# Patient Record
Sex: FEMALE | Race: WHITE | Employment: UNEMPLOYED | URBAN - METROPOLITAN AREA
[De-identification: names, ages, dates, MRNs, and addresses within clinical notes are randomized per-mention and may not be internally consistent; named-entity substitution may affect disease eponyms.]

---

## 2017-09-15 ENCOUNTER — HOSPITAL ENCOUNTER (OUTPATIENT)
Dept: CT IMAGING | Age: 57
Discharge: HOME | End: 2017-09-15
Attending: FAMILY MEDICINE
Payer: MEDICARE

## 2017-09-15 DIAGNOSIS — Z87.891 HISTORY OF TOBACCO USE: ICD-10-CM

## 2017-09-15 PROCEDURE — G0297 LDCT FOR LUNG CA SCREEN: HCPCS

## 2017-09-18 NOTE — PROGRESS NOTES
She has a 7 mm nodular density near the heart on the right, it is recommended in 6 months to get a contrast-enhanced CT of chest to recheck this    There is some inflammation/infection in the bronchioles which are the smaller airways that carry air in the lungs, if she is coughing mucus we can try antibiotics but we treat this based on her symptoms not the x-ray    Reducing or stopping smoking if she has not already is encouraged

## 2017-11-28 PROBLEM — E66.01 OBESITY, MORBID (HCC): Status: ACTIVE | Noted: 2017-11-28

## 2017-11-28 PROBLEM — R91.1 PULMONARY NODULE: Status: ACTIVE | Noted: 2017-11-28

## 2018-03-16 ENCOUNTER — HOSPITAL ENCOUNTER (OUTPATIENT)
Dept: CT IMAGING | Age: 58
Discharge: HOME | End: 2018-03-16
Attending: FAMILY MEDICINE
Payer: MEDICARE

## 2018-03-16 ENCOUNTER — HOSPITAL ENCOUNTER (OUTPATIENT)
Dept: GENERAL RADIOLOGY | Age: 58
Discharge: HOME | End: 2018-03-16
Attending: FAMILY MEDICINE
Payer: MEDICARE

## 2018-03-16 DIAGNOSIS — R91.1 PULMONARY NODULE: ICD-10-CM

## 2018-03-16 DIAGNOSIS — M54.9 BACK PAIN, UNSPECIFIED BACK LOCATION, UNSPECIFIED BACK PAIN LATERALITY, UNSPECIFIED CHRONICITY: ICD-10-CM

## 2018-03-16 PROCEDURE — 72100 X-RAY EXAM L-S SPINE 2/3 VWS: CPT

## 2018-03-16 PROCEDURE — 71250 CT THORAX DX C-: CPT

## 2018-05-01 PROBLEM — R06.02 SHORTNESS OF BREATH: Status: ACTIVE | Noted: 2018-05-01

## 2018-05-01 PROBLEM — Z72.0 TOBACCO USE: Status: ACTIVE | Noted: 2018-05-01

## 2020-01-18 ENCOUNTER — HOSPITAL ENCOUNTER (OUTPATIENT)
Dept: CT IMAGING | Age: 60
Discharge: HOME | End: 2020-01-18
Attending: NURSE PRACTITIONER
Payer: MEDICARE

## 2020-01-18 DIAGNOSIS — R31.9 HEMATURIA, UNSPECIFIED: ICD-10-CM

## 2020-01-18 PROCEDURE — 74176 CT ABD & PELVIS W/O CONTRAST: CPT

## 2021-01-19 ENCOUNTER — HOSPITAL ENCOUNTER (INPATIENT)
Age: 61
LOS: 1 days | Discharge: HOME | DRG: 246 | End: 2021-01-20
Admitting: INTERNAL MEDICINE
Payer: MEDICARE

## 2021-01-19 ENCOUNTER — APPOINTMENT (OUTPATIENT)
Dept: GENERAL RADIOLOGY | Age: 61
DRG: 246 | End: 2021-01-19
Payer: MEDICARE

## 2021-01-19 DIAGNOSIS — I10 ESSENTIAL HYPERTENSION: ICD-10-CM

## 2021-01-19 DIAGNOSIS — I21.4 NSTEMI (NON-ST ELEVATED MYOCARDIAL INFARCTION) (HCC): Primary | ICD-10-CM

## 2021-01-19 DIAGNOSIS — E78.00 PURE HYPERCHOLESTEROLEMIA: ICD-10-CM

## 2021-01-19 PROBLEM — Z82.49 FH: CAD (CORONARY ARTERY DISEASE): Status: ACTIVE | Noted: 2021-01-19

## 2021-01-19 PROBLEM — F17.200 NICOTINE USE DISORDER: Status: ACTIVE | Noted: 2021-01-19

## 2021-01-19 PROBLEM — R06.09 DOE (DYSPNEA ON EXERTION): Status: ACTIVE | Noted: 2021-01-19

## 2021-01-19 LAB
ACT BLD: 538 SECS (ref 70–128)
ALBUMIN SERPL-MCNC: 3.6 G/DL (ref 3.5–5)
ALBUMIN/GLOB SERPL: 1 {RATIO} (ref 1.2–3.5)
ALP SERPL-CCNC: 74 U/L (ref 50–136)
ALT SERPL-CCNC: 21 U/L (ref 12–65)
ANION GAP SERPL CALC-SCNC: 6 MMOL/L (ref 7–16)
AST SERPL-CCNC: 9 U/L (ref 15–37)
ATRIAL RATE: 57 BPM
BASOPHILS # BLD: 0.1 K/UL (ref 0–0.2)
BASOPHILS NFR BLD: 1 % (ref 0–2)
BILIRUB SERPL-MCNC: 0.3 MG/DL (ref 0.2–1.1)
BUN SERPL-MCNC: 11 MG/DL (ref 6–23)
CALCIUM SERPL-MCNC: 9.4 MG/DL (ref 8.3–10.4)
CALCULATED P AXIS, ECG09: 47 DEGREES
CALCULATED R AXIS, ECG10: 74 DEGREES
CALCULATED T AXIS, ECG11: 70 DEGREES
CHLORIDE SERPL-SCNC: 108 MMOL/L (ref 98–107)
CK SERPL-CCNC: 1170 U/L (ref 21–215)
CO2 SERPL-SCNC: 27 MMOL/L (ref 21–32)
CREAT SERPL-MCNC: 0.82 MG/DL (ref 0.6–1)
DIAGNOSIS, 93000: NORMAL
DIFFERENTIAL METHOD BLD: NORMAL
EOSINOPHIL # BLD: 0.1 K/UL (ref 0–0.8)
EOSINOPHIL NFR BLD: 2 % (ref 0.5–7.8)
ERYTHROCYTE [DISTWIDTH] IN BLOOD BY AUTOMATED COUNT: 13.6 % (ref 11.9–14.6)
GLOBULIN SER CALC-MCNC: 3.6 G/DL (ref 2.3–3.5)
GLUCOSE SERPL-MCNC: 119 MG/DL (ref 65–100)
HCT VFR BLD AUTO: 44.3 % (ref 35.8–46.3)
HGB BLD-MCNC: 14.4 G/DL (ref 11.7–15.4)
IMM GRANULOCYTES # BLD AUTO: 0 K/UL (ref 0–0.5)
IMM GRANULOCYTES NFR BLD AUTO: 0 % (ref 0–5)
LYMPHOCYTES # BLD: 3 K/UL (ref 0.5–4.6)
LYMPHOCYTES NFR BLD: 31 % (ref 13–44)
MAGNESIUM SERPL-MCNC: 2.2 MG/DL (ref 1.8–2.4)
MCH RBC QN AUTO: 30.8 PG (ref 26.1–32.9)
MCHC RBC AUTO-ENTMCNC: 32.5 G/DL (ref 31.4–35)
MCV RBC AUTO: 94.7 FL (ref 79.6–97.8)
MONOCYTES # BLD: 0.6 K/UL (ref 0.1–1.3)
MONOCYTES NFR BLD: 6 % (ref 4–12)
NEUTS SEG # BLD: 5.8 K/UL (ref 1.7–8.2)
NEUTS SEG NFR BLD: 60 % (ref 43–78)
NRBC # BLD: 0 K/UL (ref 0–0.2)
P-R INTERVAL, ECG05: 144 MS
PLATELET # BLD AUTO: 252 K/UL (ref 150–450)
PMV BLD AUTO: 11.1 FL (ref 9.4–12.3)
POTASSIUM SERPL-SCNC: 3.8 MMOL/L (ref 3.5–5.1)
PROT SERPL-MCNC: 7.2 G/DL (ref 6.3–8.2)
Q-T INTERVAL, ECG07: 462 MS
QRS DURATION, ECG06: 72 MS
QTC CALCULATION (BEZET), ECG08: 449 MS
RBC # BLD AUTO: 4.68 M/UL (ref 4.05–5.2)
SODIUM SERPL-SCNC: 141 MMOL/L (ref 136–145)
TROPONIN-HIGH SENSITIVITY: 86.2 PG/ML (ref 0–14)
TROPONIN-HIGH SENSITIVITY: 948.4 PG/ML (ref 0–14)
TROPONIN-HIGH SENSITIVITY: ABNORMAL PG/ML (ref 0–14)
VENTRICULAR RATE, ECG03: 57 BPM
WBC # BLD AUTO: 9.6 K/UL (ref 4.3–11.1)

## 2021-01-19 PROCEDURE — 74011000250 HC RX REV CODE- 250

## 2021-01-19 PROCEDURE — 77030015766

## 2021-01-19 PROCEDURE — 80053 COMPREHEN METABOLIC PANEL: CPT

## 2021-01-19 PROCEDURE — 74011250637 HC RX REV CODE- 250/637: Performed by: INTERNAL MEDICINE

## 2021-01-19 PROCEDURE — 77030019569 HC BND COMPR RAD TERU -B

## 2021-01-19 PROCEDURE — 74011250637 HC RX REV CODE- 250/637

## 2021-01-19 PROCEDURE — 93458 L HRT ARTERY/VENTRICLE ANGIO: CPT

## 2021-01-19 PROCEDURE — 37195 THROMBOLYTIC THERAPY STROKE: CPT

## 2021-01-19 PROCEDURE — C1725 CATH, TRANSLUMIN NON-LASER: HCPCS

## 2021-01-19 PROCEDURE — 84484 ASSAY OF TROPONIN QUANT: CPT

## 2021-01-19 PROCEDURE — 92928 PRQ TCAT PLMT NTRAC ST 1 LES: CPT

## 2021-01-19 PROCEDURE — 77030016699 HC CATH ANGI DX INFN1 CARD -A

## 2021-01-19 PROCEDURE — 99152 MOD SED SAME PHYS/QHP 5/>YRS: CPT

## 2021-01-19 PROCEDURE — 85347 COAGULATION TIME ACTIVATED: CPT

## 2021-01-19 PROCEDURE — C1887 CATHETER, GUIDING: HCPCS

## 2021-01-19 PROCEDURE — 92941 PRQ TRLML REVSC TOT OCCL AMI: CPT | Performed by: INTERNAL MEDICINE

## 2021-01-19 PROCEDURE — 74011250636 HC RX REV CODE- 250/636: Performed by: INTERNAL MEDICINE

## 2021-01-19 PROCEDURE — 96374 THER/PROPH/DIAG INJ IV PUSH: CPT

## 2021-01-19 PROCEDURE — 99218 HC RM OBSERVATION: CPT

## 2021-01-19 PROCEDURE — 83735 ASSAY OF MAGNESIUM: CPT

## 2021-01-19 PROCEDURE — 74011000636 HC RX REV CODE- 636: Performed by: INTERNAL MEDICINE

## 2021-01-19 PROCEDURE — 99152 MOD SED SAME PHYS/QHP 5/>YRS: CPT | Performed by: INTERNAL MEDICINE

## 2021-01-19 PROCEDURE — 99285 EMERGENCY DEPT VISIT HI MDM: CPT

## 2021-01-19 PROCEDURE — 99153 MOD SED SAME PHYS/QHP EA: CPT

## 2021-01-19 PROCEDURE — C1894 INTRO/SHEATH, NON-LASER: HCPCS

## 2021-01-19 PROCEDURE — 99220 PR INITIAL OBSERVATION CARE/DAY 70 MINUTES: CPT | Performed by: INTERNAL MEDICINE

## 2021-01-19 PROCEDURE — 92928 PRQ TCAT PLMT NTRAC ST 1 LES: CPT | Performed by: INTERNAL MEDICINE

## 2021-01-19 PROCEDURE — 74011250637 HC RX REV CODE- 250/637: Performed by: PHYSICIAN ASSISTANT

## 2021-01-19 PROCEDURE — 74011000250 HC RX REV CODE- 250: Performed by: INTERNAL MEDICINE

## 2021-01-19 PROCEDURE — 93458 L HRT ARTERY/VENTRICLE ANGIO: CPT | Performed by: INTERNAL MEDICINE

## 2021-01-19 PROCEDURE — 82550 ASSAY OF CK (CPK): CPT

## 2021-01-19 PROCEDURE — 4A023N7 MEASUREMENT OF CARDIAC SAMPLING AND PRESSURE, LEFT HEART, PERCUTANEOUS APPROACH: ICD-10-PCS | Performed by: INTERNAL MEDICINE

## 2021-01-19 PROCEDURE — C1769 GUIDE WIRE: HCPCS

## 2021-01-19 PROCEDURE — 71045 X-RAY EXAM CHEST 1 VIEW: CPT

## 2021-01-19 PROCEDURE — B2111ZZ FLUOROSCOPY OF MULTIPLE CORONARY ARTERIES USING LOW OSMOLAR CONTRAST: ICD-10-PCS | Performed by: INTERNAL MEDICINE

## 2021-01-19 PROCEDURE — 74011250636 HC RX REV CODE- 250/636: Performed by: EMERGENCY MEDICINE

## 2021-01-19 PROCEDURE — 85025 COMPLETE CBC W/AUTO DIFF WBC: CPT

## 2021-01-19 PROCEDURE — 027135Z DILATION OF CORONARY ARTERY, TWO ARTERIES WITH TWO DRUG-ELUTING INTRALUMINAL DEVICES, PERCUTANEOUS APPROACH: ICD-10-PCS | Performed by: INTERNAL MEDICINE

## 2021-01-19 PROCEDURE — 92941 PRQ TRLML REVSC TOT OCCL AMI: CPT

## 2021-01-19 PROCEDURE — C1874 STENT, COATED/COV W/DEL SYS: HCPCS

## 2021-01-19 PROCEDURE — 36415 COLL VENOUS BLD VENIPUNCTURE: CPT

## 2021-01-19 PROCEDURE — 74011250636 HC RX REV CODE- 250/636

## 2021-01-19 PROCEDURE — B2151ZZ FLUOROSCOPY OF LEFT HEART USING LOW OSMOLAR CONTRAST: ICD-10-PCS | Performed by: INTERNAL MEDICINE

## 2021-01-19 PROCEDURE — 77030012468 HC VLV BLEEDBK CNTRL ABBT -B

## 2021-01-19 PROCEDURE — 96376 TX/PRO/DX INJ SAME DRUG ADON: CPT

## 2021-01-19 PROCEDURE — 93306 TTE W/DOPPLER COMPLETE: CPT

## 2021-01-19 PROCEDURE — 2709999900 HC NON-CHARGEABLE SUPPLY

## 2021-01-19 PROCEDURE — 96375 TX/PRO/DX INJ NEW DRUG ADDON: CPT

## 2021-01-19 PROCEDURE — 93005 ELECTROCARDIOGRAM TRACING: CPT

## 2021-01-19 RX ORDER — LEVOTHYROXINE SODIUM 75 UG/1
75 TABLET ORAL
Status: DISCONTINUED | OUTPATIENT
Start: 2021-01-20 | End: 2021-01-20 | Stop reason: HOSPADM

## 2021-01-19 RX ORDER — LIDOCAINE HYDROCHLORIDE 20 MG/ML
15 SOLUTION OROPHARYNGEAL
Status: COMPLETED | OUTPATIENT
Start: 2021-01-19 | End: 2021-01-19

## 2021-01-19 RX ORDER — HEPARIN SODIUM 5000 [USP'U]/100ML
12-25 INJECTION, SOLUTION INTRAVENOUS
Status: DISCONTINUED | OUTPATIENT
Start: 2021-01-19 | End: 2021-01-19

## 2021-01-19 RX ORDER — LIDOCAINE HYDROCHLORIDE 10 MG/ML
3-20 INJECTION, SOLUTION EPIDURAL; INFILTRATION; INTRACAUDAL; PERINEURAL ONCE
Status: COMPLETED | OUTPATIENT
Start: 2021-01-19 | End: 2021-01-19

## 2021-01-19 RX ORDER — MIDAZOLAM HYDROCHLORIDE 1 MG/ML
.5-2 INJECTION, SOLUTION INTRAMUSCULAR; INTRAVENOUS
Status: DISCONTINUED | OUTPATIENT
Start: 2021-01-19 | End: 2021-01-19

## 2021-01-19 RX ORDER — ONDANSETRON 2 MG/ML
4 INJECTION INTRAMUSCULAR; INTRAVENOUS
Status: COMPLETED | OUTPATIENT
Start: 2021-01-19 | End: 2021-01-19

## 2021-01-19 RX ORDER — MORPHINE SULFATE 4 MG/ML
4 INJECTION INTRAVENOUS
Status: COMPLETED | OUTPATIENT
Start: 2021-01-19 | End: 2021-01-19

## 2021-01-19 RX ORDER — MAG HYDROX/ALUMINUM HYD/SIMETH 200-200-20
30 SUSPENSION, ORAL (FINAL DOSE FORM) ORAL
Status: COMPLETED | OUTPATIENT
Start: 2021-01-19 | End: 2021-01-19

## 2021-01-19 RX ORDER — MORPHINE SULFATE 2 MG/ML
2 INJECTION, SOLUTION INTRAMUSCULAR; INTRAVENOUS
Status: DISCONTINUED | OUTPATIENT
Start: 2021-01-19 | End: 2021-01-20 | Stop reason: HOSPADM

## 2021-01-19 RX ORDER — ATORVASTATIN CALCIUM 10 MG/1
10 TABLET, FILM COATED ORAL DAILY
Status: DISCONTINUED | OUTPATIENT
Start: 2021-01-20 | End: 2021-01-20

## 2021-01-19 RX ORDER — LISINOPRIL 5 MG/1
10 TABLET ORAL DAILY
Status: DISCONTINUED | OUTPATIENT
Start: 2021-01-19 | End: 2021-01-20 | Stop reason: HOSPADM

## 2021-01-19 RX ORDER — HEPARIN SODIUM 200 [USP'U]/100ML
2 INJECTION, SOLUTION INTRAVENOUS CONTINUOUS
Status: DISCONTINUED | OUTPATIENT
Start: 2021-01-19 | End: 2021-01-19

## 2021-01-19 RX ORDER — HEPARIN SODIUM 10000 [USP'U]/ML
3000 INJECTION, SOLUTION INTRAVENOUS; SUBCUTANEOUS ONCE
Status: COMPLETED | OUTPATIENT
Start: 2021-01-19 | End: 2021-01-19

## 2021-01-19 RX ORDER — HEPARIN SODIUM 5000 [USP'U]/ML
60 INJECTION, SOLUTION INTRAVENOUS; SUBCUTANEOUS ONCE
Status: COMPLETED | OUTPATIENT
Start: 2021-01-19 | End: 2021-01-19

## 2021-01-19 RX ORDER — FENTANYL CITRATE 50 UG/ML
25-100 INJECTION, SOLUTION INTRAMUSCULAR; INTRAVENOUS
Status: DISCONTINUED | OUTPATIENT
Start: 2021-01-19 | End: 2021-01-19

## 2021-01-19 RX ORDER — GUAIFENESIN 100 MG/5ML
81 LIQUID (ML) ORAL DAILY
Status: DISCONTINUED | OUTPATIENT
Start: 2021-01-20 | End: 2021-01-20 | Stop reason: HOSPADM

## 2021-01-19 RX ORDER — SODIUM CHLORIDE 0.9 % (FLUSH) 0.9 %
5-40 SYRINGE (ML) INJECTION EVERY 8 HOURS
Status: DISCONTINUED | OUTPATIENT
Start: 2021-01-19 | End: 2021-01-20 | Stop reason: HOSPADM

## 2021-01-19 RX ORDER — ATROPINE SULFATE 0.1 MG/ML
0.5 INJECTION INTRAVENOUS AS NEEDED
Status: DISCONTINUED | OUTPATIENT
Start: 2021-01-19 | End: 2021-01-19

## 2021-01-19 RX ORDER — PANTOPRAZOLE SODIUM 40 MG/1
40 TABLET, DELAYED RELEASE ORAL
Status: DISCONTINUED | OUTPATIENT
Start: 2021-01-20 | End: 2021-01-20 | Stop reason: HOSPADM

## 2021-01-19 RX ORDER — HYDROCODONE BITARTRATE AND ACETAMINOPHEN 10; 325 MG/1; MG/1
2 TABLET ORAL
Status: DISCONTINUED | OUTPATIENT
Start: 2021-01-19 | End: 2021-01-20 | Stop reason: HOSPADM

## 2021-01-19 RX ORDER — FLUTICASONE PROPIONATE 50 MCG
2 SPRAY, SUSPENSION (ML) NASAL DAILY
Status: DISCONTINUED | OUTPATIENT
Start: 2021-01-20 | End: 2021-01-20 | Stop reason: HOSPADM

## 2021-01-19 RX ORDER — NITROGLYCERIN 0.4 MG/1
0.4 TABLET SUBLINGUAL
Status: DISCONTINUED | OUTPATIENT
Start: 2021-01-19 | End: 2021-01-20 | Stop reason: HOSPADM

## 2021-01-19 RX ORDER — SODIUM CHLORIDE 0.9 % (FLUSH) 0.9 %
5-40 SYRINGE (ML) INJECTION AS NEEDED
Status: DISCONTINUED | OUTPATIENT
Start: 2021-01-19 | End: 2021-01-20 | Stop reason: HOSPADM

## 2021-01-19 RX ADMIN — HEPARIN SODIUM 2 ML: 10000 INJECTION INTRAVENOUS; SUBCUTANEOUS at 11:02

## 2021-01-19 RX ADMIN — HYDROCODONE BITARTRATE AND ACETAMINOPHEN 2 TABLET: 10; 325 TABLET ORAL at 13:04

## 2021-01-19 RX ADMIN — MIDAZOLAM 2 MG: 1 INJECTION INTRAMUSCULAR; INTRAVENOUS at 11:13

## 2021-01-19 RX ADMIN — MORPHINE SULFATE 4 MG: 4 INJECTION INTRAVENOUS at 07:29

## 2021-01-19 RX ADMIN — TIROFIBAN 2552.5 MCG: 3.75 INJECTION, SOLUTION INTRAVENOUS at 11:09

## 2021-01-19 RX ADMIN — HEPARIN SODIUM 3000 UNITS: 10000 INJECTION INTRAVENOUS; SUBCUTANEOUS at 11:02

## 2021-01-19 RX ADMIN — FENTANYL CITRATE 50 MCG: 50 INJECTION, SOLUTION INTRAMUSCULAR; INTRAVENOUS at 10:53

## 2021-01-19 RX ADMIN — LIDOCAINE HYDROCHLORIDE 15 ML: 20 SOLUTION ORAL; TOPICAL at 07:29

## 2021-01-19 RX ADMIN — HEPARIN SODIUM 2 UNITS/HR: 5000 INJECTION, SOLUTION INTRAVENOUS; SUBCUTANEOUS at 10:36

## 2021-01-19 RX ADMIN — HYDROCODONE BITARTRATE AND ACETAMINOPHEN 2 TABLET: 10; 325 TABLET ORAL at 20:13

## 2021-01-19 RX ADMIN — TICAGRELOR 90 MG: 90 TABLET ORAL at 22:22

## 2021-01-19 RX ADMIN — HEPARIN SODIUM 4000 UNITS: 10000 INJECTION INTRAVENOUS; SUBCUTANEOUS at 11:05

## 2021-01-19 RX ADMIN — ONDANSETRON 4 MG: 2 INJECTION INTRAMUSCULAR; INTRAVENOUS at 07:29

## 2021-01-19 RX ADMIN — TIROFIBAN 0.15 MCG/KG/MIN: 5 INJECTION, SOLUTION INTRAVENOUS at 11:11

## 2021-01-19 RX ADMIN — LIDOCAINE HYDROCHLORIDE 3 ML: 10 INJECTION, SOLUTION EPIDURAL; INFILTRATION; INTRACAUDAL; PERINEURAL at 11:01

## 2021-01-19 RX ADMIN — MIDAZOLAM 2 MG: 1 INJECTION INTRAMUSCULAR; INTRAVENOUS at 10:52

## 2021-01-19 RX ADMIN — IOPAMIDOL 170 ML: 755 INJECTION, SOLUTION INTRAVENOUS at 11:30

## 2021-01-19 RX ADMIN — ATROPINE SULFATE 0.5 MG: 0.1 INJECTION PARENTERAL at 11:14

## 2021-01-19 RX ADMIN — TICAGRELOR 180 MG: 90 TABLET ORAL at 11:33

## 2021-01-19 RX ADMIN — FAMOTIDINE 20 MG: 10 INJECTION INTRAVENOUS at 07:28

## 2021-01-19 RX ADMIN — Medication 10 ML: at 20:14

## 2021-01-19 RX ADMIN — HEPARIN SODIUM 6150 UNITS: 5000 INJECTION INTRAVENOUS; SUBCUTANEOUS at 09:23

## 2021-01-19 RX ADMIN — LISINOPRIL 10 MG: 5 TABLET ORAL at 13:04

## 2021-01-19 RX ADMIN — ALUMINUM HYDROXIDE, MAGNESIUM HYDROXIDE, AND SIMETHICONE 30 ML: 200; 200; 20 SUSPENSION ORAL at 07:29

## 2021-01-19 RX ADMIN — Medication 10 ML: at 13:45

## 2021-01-19 RX ADMIN — HEPARIN SODIUM AND DEXTROSE 12 UNITS/KG/HR: 5000; 5 INJECTION INTRAVENOUS at 09:24

## 2021-01-19 NOTE — H&P
[facility] Cardiology History & Physical      Date of  Admission: 1/19/2021  5:09 AM     Primary Care Physician: Dr Otto Baeza   Primary Cardiologist: Dr Conchita Berger  Referring Physician: Dr Eura Hammans  Admitting Physician: Dr Kenia Yates    CC: Chest pain     HPI:  Abby Segovia is a 64 y.o. female with PMH of HTN, hypercholesterolemia, HEALY, BONNIE, hypothyroidism, GERD, COPD, and nicotine use who presented to [facility] ED with complaint of chest pain. Patient states she woke up with bilateral neck pain and then developed chest pain. Describes chest pain as center of her chest and pressure-like. Pain radiated into bilateral arms. Has associated SOB, nausea, and diaphoresis. Nothing made the pain worse. Pain was relived with [facility] NTG via EMS. Patient smokes daily. Has FH CAD in both sisters and brother. Upon evaluation in ED, BP was 218/96, EKG showed normal sinus rhythm with nonspecific ST and T wave abnormality, high sensitivity troponin was initially 86 and went to 948. Cardiology consulted for further evaluation and care. Currently patient is chest pain free.       Past Medical History:   Diagnosis Date    Abnormal abdominal CT scan     Acne     Allergic rhinitis     Anxiety     Arm pain     Arthritis     Backache, unspecified     Biceps tendonitis     Calculus of kidney     Cervical strain     Chest pain     Chronic pain     arthritis in back and legs    Chronic right shoulder pain     Cigarette smoker     Cramps, muscle, general     Cyst of joint of shoulder     Depressive disorder, not elsewhere classified     Dyshidrosis     Dyspnea     Eczema, dyshidrotic     Encounter for long-term (current) use of other medications     Encounter for monitoring of chronic aspirin therapy     Generalized pain     GERD (gastroesophageal reflux disease)     Helicobacter pylori (H. pylori)     Hematuria     HLD (hyperlipidemia)     Hypothyroidism     Low vitamin B12 level     Microhematuria 5/22/2014    Myofascial pain     Neck pain     Night sweats     Obesity     BONNIE (obstructive sleep apnea)     Osteopenia     Other B-complex deficiencies     Other specified disorders of shoulder joint     Palpitation     Postmenopausal disorder     Recurrent major depressive disorder, in partial remission (HCC)     Restless legs     Rosacea     Sinusitis     Spasm of colon     Sprain of neck     Tendonitis     Tobacco use disorder     Unspecified menopausal and postmenopausal disorder       Past Surgical History:   Procedure Laterality Date    HX APPENDECTOMY      HX  SECTION      x 2    HX CHOLECYSTECTOMY      HX COLOSTOMY      HX COLOSTOMY TAKE DOWN      HX HERNIA REPAIR  2010    [facility] HX LAPAROTOMY      drainage of diverticular abscess    HX OTHER SURGICAL      Laser Conization    HX TUBAL LIGATION Bilateral     with 2nd     US GUIDED CORE BREAST BIOPSY Bilateral 2012    Sterotactic, benign biopsy report--both showed dystropic calcifications, sclerosing adenosis and ductal hyperplasia       Allergies   Allergen Reactions    Chantix [Varenicline] Other (comments)     \"Couldn't take\"    Citalopram Other (comments)     Headache    Mirapex [Pramipexole] Nausea Only and Other (comments)     Abdominal pain    Neurontin [Gabapentin] Other (comments)     Taking two would cause nightmares    Savella [Milnacipran] Other (comments)     Headache    Sulfa (Sulfonamide Antibiotics) Nausea and Vomiting      Social History     Socioeconomic History    Marital status:      Spouse name: Not on file    Number of children: Not on file    Years of education: Not on file    Highest education level: Not on file   Occupational History    Not on file   Social Needs    Financial resource strain: Not on file    Food insecurity     Worry: Not on file     Inability: Not on file    Transportation needs     Medical: Not on file     Non-medical: Not on file Tobacco Use    Smoking status: Current Every Day Smoker     Packs/day: 1.00     Years: 39.00     Pack years: 39.00    Smokeless tobacco: Never Used   Substance and Sexual Activity    Alcohol use: No    Drug use: No    Sexual activity: Not on file   Lifestyle    Physical activity     Days per week: Not on file     Minutes per session: Not on file    Stress: Not on file   Relationships    Social connections     Talks on phone: Not on file     Gets together: Not on file     Attends [employer] service: Not on file     Active member of club or organization: Not on file     Attends meetings of clubs or organizations: Not on file     Relationship status: Not on file    Intimate partner violence     Fear of current or ex partner: Not on file     Emotionally abused: Not on file     Physically abused: Not on file     Forced sexual activity: Not on file   Other Topics Concern    Not on file   Social History Narrative    Not on file     Family History   Problem Relation Age of Onset    Hypertension Mother     Heart Disease Mother         hole in heart; top part swollen    Thyroid Disease Mother     Diabetes Father     Heart Attack Father 61    Heart Disease Father     Alcohol abuse Brother     Heart Attack Brother 62        with stent    Cancer Brother         Pancreatic    Cancer Maternal Grandmother         lymph nodes    Stroke Maternal Grandmother     Hypertension Maternal Grandmother     Diabetes Maternal Grandmother     Heart Attack Sister 43        with stent    Stroke Sister     Hypertension Sister     Thyroid Disease Sister     Cancer Sister         Pancreatic    Breast Cancer Neg Hx         Current Facility-Administered Medications   Medication Dose Route Frequency    heparin 25,000 units in dextrose 500 mL infusion  12-25 Units/kg/hr IntraVENous TITRATE     Current Outpatient Medications   Medication Sig    phentermine (ADIPEX-P) 37.5 mg capsule Take 37.5 mg by mouth every morning.  celecoxib (CELEBREX) 200 mg capsule Take 200 mg by mouth daily.  pramipexole (MIRAPEX) 0.25 mg tablet Take 0.5-1 Tabs by mouth nightly.  HYDROcodone-acetaminophen (NORCO)  mg tablet Take 2 Tabs by mouth three (3) times daily as needed for Pain. Max Daily Amount: 6 Tabs.  levothyroxine (SYNTHROID) 75 mcg tablet Take 1 Tab by mouth Daily (before breakfast). Except 1.5 on fridays .  omeprazole (PRILOSEC) 20 mg capsule Take 1 Cap by mouth daily.  doxycycline (MONODOX) 100 mg capsule Take 1 Cap by mouth two (2) times a day. Swallow well with large glass of water  Indications: ACNE ROSACEA    varenicline (CHANTIX) 1 mg tablet Take 1 Tab by mouth two (2) times a day for 30 days. Indications: Smoking Cessation    albuterol (PROVENTIL HFA, VENTOLIN HFA, PROAIR HFA) 90 mcg/actuation inhaler Take 2 Puffs by inhalation every four (4) hours as needed for Wheezing.  fluticasone (FLONASE) 50 mcg/actuation nasal spray 2 Sprays by Both Nostrils route daily.  atorvastatin (LIPITOR) 10 mg tablet Take 1 Tab by mouth daily.  aspirin 81 mg chewable tablet Take 81 mg by mouth daily. Review of Symptoms:  General: No weight changes,  weakness, fever or chills  Skin: no rashes, lumps, or other skin changes  HEENT: no headache, dizziness, lightheadedness, vision changes, hearing changes, tinnitus, vertigo, sinus pressure/pain, bleeding gums, sore throat, or hoarseness  Neck: no swollen glands, goiter, pain or stiffness  Respiratory: Positive for dyspnea. No cough, sputum, hemoptysis,  wheezing  Cardiovascular: Positive for chest pain, dyspnea. No orthopnea, paroxysmal nocturnal dyspnea, peripheral edema   Gastrointestinal: Positive for GERD.  No constipation, diarrhea, liver problems, or h/o GI bleed  Urinary: no frequency, urgency , hematuria, burning/pain with urination, recent flank pain, polyuria, nocturia, or difficulty urinating  Peripheral Vascular: no claudication, leg cramps, prior DVTs, swelling of calves, legs, or feet, color change, or swelling with redness or tenderness  Musculoskeletal: Positive for back pain. Psychiatric: no depression or excessive stress  Neurological: no sensory or motor loss, seizures, syncope, tremors, numbness, no dementia  Hematologic: no anemia, easy bruising or bleeding  Endocrine: Positive for thyroid problems. No heat or cold intolerance, excessive sweating, polyuria, polydipsia,  diabetes.      Subjective:     Physical Exam:    Vitals:    01/19/21 0550 01/19/21 0559 01/19/21 0729 01/19/21 0840   BP: (!) 173/81 (!) 175/98 (!) 176/75 (!) 156/72   Pulse: (!) 58 (!) 58 (!) 59 62   Resp: 21 21 21 20   Temp:       SpO2: 97% 96% 94% 93%   Weight:       Height:           General: Well Developed, Well Nourished, No Acute Distress  HEENT: pupils equal and round, no abnormalities noted  Neck: supple, no JVD, no carotid bruits  Heart: S1S2 with RRR without murmurs or gallops  Lungs: Clear throughout auscultation bilaterally without adventitious sounds  Abd: soft, nontender, nondistended, with good bowel sounds  Ext: warm, no edema, calves supple/nontender, pulses 2+ bilaterally  Skin: warm and dry  Psychiatric: Normal mood and affect  Neurologic: Alert and oriented X 3    Cardiographics    Telemetry: Sinus bradycardia  ECG: sinus bradycardia, non specific ST and T wave abnormalities   Echocardiogram: orderd    Labs:   Recent Results (from the past 24 hour(s))   EKG, 12 LEAD, INITIAL    Collection Time: 01/19/21  5:04 AM   Result Value Ref Range    Ventricular Rate 57 BPM    Atrial Rate 57 BPM    P-R Interval 144 ms    QRS Duration 72 ms    Q-T Interval 462 ms    QTC Calculation (Bezet) 449 ms    Calculated P Axis 47 degrees    Calculated R Axis 74 degrees    Calculated T Axis 70 degrees    Diagnosis       Sinus bradycardia with Premature atrial complexes  Nonspecific ST and T wave abnormality  Abnormal ECG  No previous ECGs available     CBC WITH AUTOMATED DIFF    Collection Time: 01/19/21  5:13 AM   Result Value Ref Range    WBC 9.6 4.3 - 11.1 K/uL    RBC 4.68 4.05 - 5.2 M/uL    HGB 14.4 11.7 - 15.4 g/dL    HCT 44.3 35.8 - 46.3 %    MCV 94.7 79.6 - 97.8 FL    MCH 30.8 26.1 - 32.9 PG    MCHC 32.5 31.4 - 35.0 g/dL    RDW 13.6 11.9 - 14.6 %    PLATELET 637 236 - 274 K/uL    MPV 11.1 9.4 - 12.3 FL    ABSOLUTE NRBC 0.00 0.0 - 0.2 K/uL    DF AUTOMATED      NEUTROPHILS 60 43 - 78 %    LYMPHOCYTES 31 13 - 44 %    MONOCYTES 6 4.0 - 12.0 %    EOSINOPHILS 2 0.5 - 7.8 %    BASOPHILS 1 0.0 - 2.0 %    IMMATURE GRANULOCYTES 0 0.0 - 5.0 %    ABS. NEUTROPHILS 5.8 1.7 - 8.2 K/UL    ABS. LYMPHOCYTES 3.0 0.5 - 4.6 K/UL    ABS. MONOCYTES 0.6 0.1 - 1.3 K/UL    ABS. EOSINOPHILS 0.1 0.0 - 0.8 K/UL    ABS. BASOPHILS 0.1 0.0 - 0.2 K/UL    ABS. IMM. GRANS. 0.0 0.0 - 0.5 K/UL   METABOLIC PANEL, COMPREHENSIVE    Collection Time: 01/19/21  5:13 AM   Result Value Ref Range    Sodium 141 136 - 145 mmol/L    Potassium 3.8 3.5 - 5.1 mmol/L    Chloride 108 (H) 98 - 107 mmol/L    CO2 27 21 - 32 mmol/L    Anion gap 6 (L) 7 - 16 mmol/L    Glucose 119 (H) 65 - 100 mg/dL    BUN 11 6 - 23 MG/DL    Creatinine 0.82 0.6 - 1.0 MG/DL    GFR est AA >60 >60 ml/min/1.73m2    GFR est non-AA >60 >60 ml/min/1.73m2    Calcium 9.4 8.3 - 10.4 MG/DL    Bilirubin, total 0.3 0.2 - 1.1 MG/DL    ALT (SGPT) 21 12 - 65 U/L    AST (SGOT) 9 (L) 15 - 37 U/L    Alk.  phosphatase 74 50 - 136 U/L    Protein, total 7.2 6.3 - 8.2 g/dL    Albumin 3.6 3.5 - 5.0 g/dL    Globulin 3.6 (H) 2.3 - 3.5 g/dL    A-G Ratio 1.0 (L) 1.2 - 3.5     TROPONIN-HIGH SENSITIVITY    Collection Time: 01/19/21  5:13 AM   Result Value Ref Range    Troponin-High Sensitivity 86.2 (H) 0 - 14 pg/mL   MAGNESIUM    Collection Time: 01/19/21  5:13 AM   Result Value Ref Range    Magnesium 2.2 1.8 - 2.4 mg/dL   TROPONIN-HIGH SENSITIVITY    Collection Time: 01/19/21  7:35 AM   Result Value Ref Range    Troponin-High Sensitivity 948.4 (HH) 0 - 14 pg/mL     Pt has been seen and examined by Dr. Kenia Yates and he agrees with the following assessment and plan:     Assessment/Plan:     NSTEMI- heparin gtt, LHC today, ECHO, cont ASA, statin, no BB secondary to bradycardia, further pending clinical course    HTN- start Lisinopril    HEALY- ECHO    Hypercholesterolemia- check lipid panel, cont statin     GERD- cont PPI    Nicotine use- encourage smoking cessation     FH CAD    TYESHA Trevino

## 2021-01-19 NOTE — PROCEDURES
[facility]  CARDIAC CATH    Name:  Nico Golden  MR#:  [id number]  :  1960  ACCOUNT #:  [de-identified]  DATE OF SERVICE:  2021    PRIMARY CARDIOLOGIST:  Jason Short DO    PRIMARY CARE:  Ryder Barrios MD    BRIEF HISTORY:  The patient is a 53-year-old morbidly obese female with history of hypertension, dyslipidemia, obstructive sleep apnea, morbid obesity, COPD, and nicotine dependence with ongoing smoking. She presents with acute severe substernal chest pain, brought in by EMS. Initial EKG demonstrates nonspecific ST-T wave changes. Pain was severe and radiating to bilateral arms. She was given nitroglycerin and morphine with improvement in pain. Pain became increasingly worse in the emergency room. Troponins were noted to be elevated and the patient was brought in a semi urgent fashion to the cardiac catheterization lab for non-ST-elevation myocardial infarction. PREOPERATIVE DIAGNOSIS:  Non-ST-elevation myocardial infarction. POSTOPERATIVE DIAGNOSIS:  Coronary artery disease. PROCEDURES PERFORMED:  1.  Bilateral selective coronary angiography with left ventriculography. 2.  PCI and stenting of acutely occluded thrombotic first obtuse marginal.  3.  PCI and stenting of the proximal LAD. 4.  Conscious sedation. SURGEON:  Gracy Daigle MD    ASSISTANT:  None. ANESTHESIA:  Conscious sedation. Start time 10:55, end time 11:35. MEDICATIONS:  2 mg of Versed, 50 mcg of fentanyl    MONITORING RN:  Conscious sedation was administered by Deyvi Hurley RN, who is a licensed nurse and appropriate monitoring of the patient throughout the procedure under the direction and supervision of myself. COMPLICATIONS:  None. IMPLANTS:  Please see below. SPECIMENS REMOVED:  None. ESTIMATED BLOOD LOSS:  5 mL    PROCEDURE:  After informed consent, the patient was prepped and draped in usual sterile fashion.   The right wrist was infiltrated with lidocaine. The right radial artery was accessed. A 6-Zambian sheath was utilized. A 5-Zambian Tiger catheter was utilized for left and right coronary artery injections and a 5-Zambian angled pigtail for left ventriculography. Isovue contrast utilized. FINDINGS:  1. Left ventricle:  Low-normal left ventricular systolic function. Ejection fraction 50%. There is mild lateral hypokinesis present. Left ventricular end-diastolic pressure is measured at 9 mmHg. There is 1+ mitral regurgitation. No aortic valve gradient. 2.  Left main:  Left main is large. It trifurcates into LAD, ramus and circumflex vessels. Appears angiographically normal.  3.  Left anterior descending coronary artery: It is a moderate to large-sized vessel. It has an eccentric ulcerated-appearing proximal 90% stenosis followed by a 70% to 80% mid stenosis. The LAD is then quite large as it courses to the apex. 4.  Ramus intermedius: It is a small vessel with 30% ostial stenosis. 5.  Left circumflex coronary artery: It is a relatively small vessel with a 30% proximal stenosis. This is occluded after the AV circumflex branch within the first obtuse marginal.  There are no acute collaterals present. 6.  Right coronary artery: It is a large vessel. It has a 30% proximal stenosis. It gives rise to a moderate-sized posterior descending and posterolateral branches which appear normal.    PERCUTANEOUS CORONARY INTERVENTION:  Lesion #1:  First obtuse marginal, pre-stenosis 100%, post stenosis 0%. Details: The patient was anticoagulated with heparin and Aggrastat. The Aggrastat was administered intracoronary. Prior to PCI, the patient developed ventricular fibrillation arrest requiring cardioversion and restoration to sinus bradycardia in the 30s. She was administered atropine with improvement in the heart rate and hemodynamic stabilized. A Runthrough wire was then quickly passed through the acute lesion.   This was ballooned with a 2.25 x 15 NC Trek balloon and subsequently stented with a 2.25 x 22 Ramsay drug-eluting stent inflated to 14 atmospheres. This yielded an excellent angiographic result. The wire was removed. Lesion #2:  Proximal LAD, pre-stenosis 90%, post stenosis 0%. Details: The patient had been anticoagulated with heparin and Aggrastat. The same Runthrough wire was advanced in the distal LAD. The lesion was directly stented with a 2.75 x 34 Liam drug-eluting stent. The proximal and midportions of the stent were post dilated at high pressures with a 3.5 x 15 NC Trek balloon. This yielded an excellent angiographic result. The wires were removed and orthogonal views were obtained. A 180 mg of Brilinta were administered in the lab. Successful hemostasis with pneumatic radial band. CONCLUSIONS:  1. Low-normal left ventricular systolic function with mild lateral hypokinesis. 2.  Acute ventricular fibrillation in the face of acute ischemia, status post cardioversion and restoration of sinus bradycardia which responded to atropine. The patient is transferred back to the recovery area in stable condition. 3.  Acutely occluded first obtuse marginal status post PCI and stenting with Ramsay drug-eluting stent. 4.  Complex ulcerated proximal LAD stenosis status post stenting with Ramsay drug-eluting stent. RECOMMENDATIONS:  Further medical recommendations and intensification of medical management pending clinical course. Thank you for allowing us to participate in the care of this patient. For any questions or concerns, please feel free to contact me.       MD ROSALEE [email or web address]  D:  01/19/2021 12:00  T:  01/19/2021 12:58  JOB #:  [id number]

## 2021-01-19 NOTE — PROGRESS NOTES
TRANSFER - OUT REPORT:    NSTEMI [facility] Dr Kim Chol  RRA  1 stent LAD  1 stent OM  Versed 4 mg  Fentanyl 50 mcg  Heparin 9,000 units  Aggrastat bolus, infusion run for 6 hours  Atropine 0.5 mg  Brilinta 180 mg  Band 13 ml  No bleeding/hematoma    Verbal report given to renata(name) on Rosalinda Black  being transferred to cpru(unit) for routine progression of care       Report consisted of patients Situation, Background, Assessment and   Recommendations(SBAR). Information from the following report(s) SBAR and Procedure Summary was reviewed with the receiving nurse. Lines:   Peripheral IV 01/19/21 Left Antecubital (Active)        Opportunity for questions and clarification was provided.

## 2021-01-19 NOTE — PROGRESS NOTES
TRANSFER - OUT REPORT:    Verbal report given to Estrellita Crespo RN on  Company  being transferred to 3rd floor for routine progression of care       Report consisted of patients Situation, Background, Assessment and Recommendations(SBAR). Information from the following report(s) SBAR, Kardex, Procedure Summary, MAR and Recent Results was reviewed with the receiving nurse. Opportunity for questions and clarification was provided. Right radial TR band C/D/I without bleeding or hematoma. Pt verbalizes understanding of limited movement of RUE. Aggrastat gtt infusing at 0.15mcg/kg/min.

## 2021-01-19 NOTE — ED TRIAGE NOTES
Pt arrives via [facility] from home for chest pain. Pain was initially located in the neck but after a short time she began hurting in her chest as well. Pt states pain briefly radiated to both arms. Pt denies SOB, N/V. PT was given 325 ASA and 2 sprays of sublingual nitro. Nitro did relieve her neck pain for a short time.

## 2021-01-19 NOTE — PROGRESS NOTES
TRANSFER - IN REPORT:    Verbal report received from Geryl Cranker, [facility] on [address] being received from [address] [place] [address] for routine progression of care      Report consisted of patients Situation, Background, Assessment and Recommendations(SBAR). Information from the following report(s) Procedure Summary was reviewed with the receiving nurse. Opportunity for questions and clarification was provided. Assessment completed upon patients arrival to unit and care assumed. Aggrastat gtt infusing at 0.15mcg/kg/min. Heparin not infusing at this time.

## 2021-01-19 NOTE — ED NOTES
Assumed care of patient with second troponin pending. Repeat troponin is increased at 980. She is symptom-free at this time, resting. Plan discussed case with cardiology.

## 2021-01-19 NOTE — ED PROVIDER NOTES
61-year-old female complaining of atypical chest pain radiating to her neck and down both arms. Patient is quite anxious because of family member had similar complaints and found to have a bad heart. Patient had cardiac work-up in 2018 following was found:    Ejection Fraction: 76%     CONCLUSION:   1. Stress EKG: Non diagnostic due to pharmacologic infusion. 2. SPECT Perfusion Imaging: Normal Perfusion. 3. LV Systolic Function is  normal.   4. Risk Assessment:  Low Risk Scan. Chest Pain   This is a new problem. The current episode started 1 to 2 hours ago. The problem has not changed since onset. The problem occurs constantly. The pain is associated with normal activity. The pain is at a severity of 7/10. The pain is moderate. The quality of the pain is described as pressure-like. The pain radiates to the left neck and right neck. Pertinent negatives include no diaphoresis, no dizziness, no exertional chest pressure, no malaise/fatigue, no shortness of breath and no weakness. She has tried nothing for the symptoms. Risk factors include smoking/tobacco exposure, family history, stress and obesity. Her past medical history does not include DVT or PE.         Past Medical History:   Diagnosis Date    Abnormal abdominal CT scan     Acne     Allergic rhinitis     Anxiety     Arm pain     Arthritis     Backache, unspecified     Biceps tendonitis     Calculus of kidney     Cervical strain     Chest pain     Chronic pain     arthritis in back and legs    Chronic right shoulder pain     Cigarette smoker     Cramps, muscle, general     Cyst of joint of shoulder     Depressive disorder, not elsewhere classified     Dyshidrosis     Dyspnea     Eczema, dyshidrotic     Encounter for long-term (current) use of other medications     Encounter for monitoring of chronic aspirin therapy     Generalized pain     GERD (gastroesophageal reflux disease)     Helicobacter pylori (H. pylori)     Hematuria     HLD (hyperlipidemia)     Hypothyroidism     Low vitamin B12 level     Microhematuria 2014    Myofascial pain     Neck pain     Night sweats     Obesity     BONNIE (obstructive sleep apnea)     Osteopenia     Other B-complex deficiencies     Other specified disorders of shoulder joint     Palpitation     Postmenopausal disorder     Recurrent major depressive disorder, in partial remission ([address]     Restless legs     Rosacea     Sinusitis     Spasm of colon     Sprain of neck     Tendonitis     Tobacco use disorder     Unspecified menopausal and postmenopausal disorder        Past Surgical History:   Procedure Laterality Date    HX APPENDECTOMY      HX  SECTION      x 2    HX CHOLECYSTECTOMY      HX COLOSTOMY      HX COLOSTOMY TAKE DOWN      HX HERNIA REPAIR  2010    [facility]- Dr. Rossy Hassan HX LAPAROTOMY      drainage of diverticular abscess    HX OTHER SURGICAL      Laser Conization    HX TUBAL LIGATION Bilateral     with 2nd     US GUIDED CORE BREAST BIOPSY Bilateral 2012    Sterotactic, benign biopsy report--both showed dystropic calcifications, sclerosing adenosis and ductal hyperplasia         Family History:   Problem Relation Age of Onset    Hypertension Mother     Heart Disease Mother         hole in heart; top part swollen    Thyroid Disease Mother     Diabetes Father     Heart Attack Father 61    Heart Disease Father     Alcohol abuse Brother     Heart Attack Brother 62        with stent    Cancer Brother         Pancreatic    Cancer Maternal Grandmother         lymph nodes    Stroke Maternal Grandmother     Hypertension Maternal Grandmother     Diabetes Maternal Grandmother     Heart Attack Sister 43        with stent    Stroke Sister     Hypertension Sister     Thyroid Disease Sister     Cancer Sister         Pancreatic    Breast Cancer Neg Hx        Social History     Socioeconomic History    Marital status:      Spouse name: Not on file    Number of children: Not on file    Years of education: Not on file    Highest education level: Not on file   Occupational History    Not on file   Social Needs    Financial resource strain: Not on file    Food insecurity     Worry: Not on file     Inability: Not on file    Transportation needs     Medical: Not on file     Non-medical: Not on file   Tobacco Use    Smoking status: Current Every Day Smoker     Packs/day: 1.00     Years: 39.00     Pack years: 39.00    Smokeless tobacco: Never Used   Substance and Sexual Activity    Alcohol use: No    Drug use: No    Sexual activity: Not on file   Lifestyle    Physical activity     Days per week: Not on file     Minutes per session: Not on file    Stress: Not on file   Relationships    Social connections     Talks on phone: Not on file     Gets together: Not on file     Attends Sikh service: Not on file     Active member of club or organization: Not on file     Attends meetings of clubs or organizations: Not on file     Relationship status: Not on file    Intimate partner violence     Fear of current or ex partner: Not on file     Emotionally abused: Not on file     Physically abused: Not on file     Forced sexual activity: Not on file   Other Topics Concern    Not on file   Social History Narrative    Not on file         ALLERGIES: Chantix [varenicline], Citalopram, Mirapex [pramipexole], Neurontin [gabapentin], Savella [milnacipran], and Sulfa (sulfonamide antibiotics)    Review of Systems   Constitutional: Negative. Negative for activity change, diaphoresis and malaise/fatigue. HENT: Negative. Eyes: Negative. Respiratory: Negative. Negative for shortness of breath. Cardiovascular: Positive for chest pain. Gastrointestinal: Negative. Genitourinary: Negative. Musculoskeletal: Negative. Skin: Negative. Neurological: Negative. Negative for dizziness and weakness. Psychiatric/Behavioral: The patient is nervous/anxious. All other systems reviewed and are negative. Vitals:    01/19/21 0503 01/19/21 0512   BP: (!) 218/96 (!) 202/93   Pulse: (!) 53 (!) 55   Resp: 16 22   Temp: 97.9 °F (36.6 °C)    SpO2: 97% 95%   Weight: 102.1 kg (225 lb)    Height: 5' 2\" (1.575 m)             Physical Exam  Vitals signs and nursing note reviewed. Constitutional:       General: She is not in acute distress. Appearance: Normal appearance. She is well-developed. She is not ill-appearing, toxic-appearing or diaphoretic. HENT:      Head: Normocephalic and atraumatic. No right periorbital erythema or left periorbital erythema. Jaw: There is normal jaw occlusion. Salivary Glands: Right salivary gland is not diffusely enlarged. Left salivary gland is not diffusely enlarged. Right Ear: External ear normal.      Left Ear: External ear normal.      Nose: Nose normal. No congestion or rhinorrhea. Mouth/Throat:      Mouth: Mucous membranes are moist.      Pharynx: No oropharyngeal exudate or posterior oropharyngeal erythema. Eyes:      General: Lids are normal. No scleral icterus. Right eye: No discharge. Left eye: No discharge. Extraocular Movements: Extraocular movements intact. Conjunctiva/sclera: Conjunctivae normal.      Right eye: Right conjunctiva is not injected. Left eye: Left conjunctiva is not injected. Pupils: Pupils are equal, round, and reactive to light. Neck:      Musculoskeletal: Full passive range of motion without pain, normal range of motion and neck supple. Normal range of motion. No erythema, neck rigidity, injury, pain with movement or muscular tenderness. Thyroid: No thyroid mass. Vascular: No JVD. Trachea: Trachea and phonation normal.   Cardiovascular:      Rate and Rhythm: Normal rate and regular rhythm. Pulses: Normal pulses. Heart sounds: Normal heart sounds.  Heart sounds not distant. No murmur. No friction rub. No gallop. Pulmonary:      Effort: Pulmonary effort is normal. No tachypnea, accessory muscle usage, respiratory distress or retractions. Breath sounds: No stridor. No decreased breath sounds, wheezing, rhonchi or rales. Chest:      Chest wall: No tenderness. Abdominal:      General: Abdomen is flat. Bowel sounds are normal. There is no distension. There are no signs of injury. Palpations: Abdomen is soft. There is no fluid wave, mass or pulsatile mass. Tenderness: There is no abdominal tenderness. There is no guarding or rebound. Musculoskeletal: Normal range of motion. General: No swelling, tenderness, deformity or signs of injury. Right lower leg: No edema. Left lower leg: No edema. Lymphadenopathy:      Cervical: No cervical adenopathy. Skin:     General: Skin is warm and dry. Capillary Refill: Capillary refill takes less than 2 seconds. Coloration: Skin is not jaundiced or pale. Findings: No bruising, erythema or rash. Neurological:      General: No focal deficit present. Mental Status: She is alert and oriented to person, place, and time. Mental status is at baseline. GCS: GCS eye subscore is 4. GCS verbal subscore is 5. GCS motor subscore is 6. Cranial Nerves: No dysarthria or facial asymmetry. Sensory: No sensory deficit. Motor: No weakness or tremor. Coordination: Coordination normal.   Psychiatric:         Mood and Affect: Mood normal.         Behavior: Behavior normal. Behavior is cooperative. Thought Content: Thought content normal.         Judgment: Judgment normal.          MDM  Number of Diagnoses or Management Options  Diagnosis management comments: Work-up for chest pain with serial troponins EKG chest x-ray.        Amount and/or Complexity of Data Reviewed  Clinical lab tests: ordered and reviewed  Tests in the radiology section of CPT®: ordered and reviewed  Tests in the medicine section of CPT®: ordered and reviewed  Decide to obtain previous medical records or to obtain history from someone other than the patient: yes  Review and summarize past medical records: yes  Independent visualization of images, tracings, or specimens: yes    Risk of Complications, Morbidity, and/or Mortality  Presenting problems: high  Diagnostic procedures: high  Management options: high    Patient Progress  Patient progress: stable         Procedures

## 2021-01-19 NOTE — ROUTINE PROCESS
Radial compression band removed at 1550 after slowly reducing air from 13 cc to zero as per hospital protocol. No bleeding or hematoma noted. 2 x 2 gauze with tegaderm placed over puncture site. The affected extremity is warm and dry to the touch. Frequent vital signs printed and placed on bedside chart. Patient instructed to call if any bleeding noted on gauze. Patient verbalized understanding the nursing instructions. A small hematoma was expressed. Borders were marked to check for expansion. No expansion noted.

## 2021-01-19 NOTE — ROUTINE PROCESS
TRANSFER - IN REPORT: 
 
Verbal report received from OCTAVIO Pérez on Chio Walker being received from [facility] for routine progression of care.  
 
Report consisted of patient’s Situation, Background, Assessment and Recommendations(SBAR).  
 
Information from the following report(s) SBAR, Kardex, Procedure Summary, MAR and Cardiac Rhythm SR was reviewed. Opportunity for questions and clarification was provided.   
 
Assessment completed upon patient’s arrival to unit and care assumed.  
 
Patient received to room 308. Patient connected to monitor and assessment completed. Plan of care reviewed. Patient oriented to room and call light. Patient aware to use call light to communicate any chest pain or needs.  
 
Admission skin assessment completed with second RN and reveals the following: 
 
Scattered abrasions all over body. Heels intact. Sacrum to be visualized when patient gets up. No obvious scars, wounds, bruises of note.

## 2021-01-19 NOTE — PROCEDURES
Brief Cardiac Procedure Note    Patient: Sulaiman Leslie MRN: [id number]  SSN: [id number]    YOB: 1960  Age: 64 y.o. Sex: female      Date of Procedure: 1/19/2021     Pre-procedure Diagnosis: NSTEMI    Post-procedure Diagnosis: Coronary Artery Disease    Procedure: Left Heart Catheterization with Percutaneous Coronary Intervention    Brief Description of Procedure: See note    Performed By: John Hinkle MD     Assistants: None    Anesthesia: Moderate Sedation    Estimated Blood Loss: Less than 10 mL      Specimens: None    Implants: None    Findings:   LV:  EF 55%  LM:  NML  LAD:  90% prox at 70% mid  RI:  30% prox  LCx:  30% prox and 100% OM1  RCA:  20% prox    PCI OM1 100-0%   VF  - shock x 1   2.25x15 [place]   2.25x22 [place]    PCI pLAD 90-0%   2.75x34 [place]   3.5x15 NC Trek    Heparin and Aggrastat    180mg Brilinta    Right radial    Complications: None    Recommendations: Continue medical therapy.     Signed By: John Hinkle MD     January 19, 2021

## 2021-01-19 NOTE — ROUTINE PROCESS
Bedside and Verbal shift change report given to Braydon Ricketts RNs (oncoming nurse) by self and Hallie Manzano RN (offgoing nurse). Report included the following information SBAR, Kardex, ED Summary, Procedure Summary, Intake/Output, MAR, Recent Results and Cardiac Rhythm SR-SB. R radial site with gauze and tegaderm in place. Bruising noted at wrist site.

## 2021-01-19 NOTE — PROGRESS NOTES
CM chart reviewed. Pt presented to [facility] ED with complainst of chest pain. Pt is in [address]. At this time, there are no discharge needs identified, however, will continue to monitor. 1321-Pt is now discharging home in stable condition. No d/c needs identified. Tx goals met. Care Management Interventions  PCP Verified by CM: Yes(Dr. Ofe Meehan MD.)  Mode of Transport at Discharge:  Other (see comment)(Family)  Transition of Care Consult (CM Consult): Discharge Planning  Discharge Durable Medical Equipment: No  Physical Therapy Consult: No  Occupational Therapy Consult: No  Speech Therapy Consult: No  Current Support Network: Family Lives [place], Own Home  Confirm Follow Up Transport: Self  The Plan for Transition of Care is Related to the Following Treatment Goals : Return to baseline  The Patient and/or Patient Representative was Provided with a Choice of Provider and Agrees with the Discharge Plan?: Yes  Name of the Patient Representative Who was Provided with a Choice of Provider and Agrees with the Discharge Plan: Patient  Freedom of Choice List was Provided with Basic Dialogue that Supports the Patient's Individualized Plan of Care/Goals, Treatment Preferences and Shares the Quality Data Associated with the Providers?: Yes  [place] Resource Information Provided?: No  Discharge Location  Discharge Placement: Home

## 2021-01-20 VITALS
HEIGHT: 62 IN | TEMPERATURE: 97.8 F | OXYGEN SATURATION: 94 % | HEART RATE: 64 BPM | SYSTOLIC BLOOD PRESSURE: 139 MMHG | BODY MASS INDEX: 40.72 KG/M2 | RESPIRATION RATE: 18 BRPM | DIASTOLIC BLOOD PRESSURE: 64 MMHG | WEIGHT: 221.3 LBS

## 2021-01-20 LAB
ANION GAP SERPL CALC-SCNC: 7 MMOL/L (ref 7–16)
BASOPHILS # BLD: 0 K/UL (ref 0–0.2)
BASOPHILS NFR BLD: 0 % (ref 0–2)
BUN SERPL-MCNC: 12 MG/DL (ref 6–23)
CALCIUM SERPL-MCNC: 8.7 MG/DL (ref 8.3–10.4)
CHLORIDE SERPL-SCNC: 111 MMOL/L (ref 98–107)
CHOLEST SERPL-MCNC: 167 MG/DL
CO2 SERPL-SCNC: 24 MMOL/L (ref 21–32)
CREAT SERPL-MCNC: 0.76 MG/DL (ref 0.6–1)
DIFFERENTIAL METHOD BLD: NORMAL
EOSINOPHIL # BLD: 0.2 K/UL (ref 0–0.8)
EOSINOPHIL NFR BLD: 2 % (ref 0.5–7.8)
ERYTHROCYTE [DISTWIDTH] IN BLOOD BY AUTOMATED COUNT: 13.8 % (ref 11.9–14.6)
GLUCOSE SERPL-MCNC: 91 MG/DL (ref 65–100)
HCT VFR BLD AUTO: 38.6 % (ref 35.8–46.3)
HDLC SERPL-MCNC: 39 MG/DL (ref 40–60)
HDLC SERPL: 4.3 {RATIO}
HGB BLD-MCNC: 12.5 G/DL (ref 11.7–15.4)
IMM GRANULOCYTES # BLD AUTO: 0 K/UL (ref 0–0.5)
IMM GRANULOCYTES NFR BLD AUTO: 0 % (ref 0–5)
LDLC SERPL CALC-MCNC: 104.2 MG/DL
LIPID PROFILE,FLP: ABNORMAL
LYMPHOCYTES # BLD: 2.8 K/UL (ref 0.5–4.6)
LYMPHOCYTES NFR BLD: 36 % (ref 13–44)
MCH RBC QN AUTO: 30.9 PG (ref 26.1–32.9)
MCHC RBC AUTO-ENTMCNC: 32.4 G/DL (ref 31.4–35)
MCV RBC AUTO: 95.3 FL (ref 79.6–97.8)
MONOCYTES # BLD: 0.5 K/UL (ref 0.1–1.3)
MONOCYTES NFR BLD: 7 % (ref 4–12)
NEUTS SEG # BLD: 4.2 K/UL (ref 1.7–8.2)
NEUTS SEG NFR BLD: 55 % (ref 43–78)
NRBC # BLD: 0 K/UL (ref 0–0.2)
PLATELET # BLD AUTO: 219 K/UL (ref 150–450)
PMV BLD AUTO: 11.5 FL (ref 9.4–12.3)
POTASSIUM SERPL-SCNC: 4 MMOL/L (ref 3.5–5.1)
RBC # BLD AUTO: 4.05 M/UL (ref 4.05–5.2)
SODIUM SERPL-SCNC: 142 MMOL/L (ref 136–145)
TRIGL SERPL-MCNC: 119 MG/DL (ref 35–150)
VLDLC SERPL CALC-MCNC: 23.8 MG/DL (ref 6–23)
WBC # BLD AUTO: 7.7 K/UL (ref 4.3–11.1)

## 2021-01-20 PROCEDURE — 2709999900 HC NON-CHARGEABLE SUPPLY

## 2021-01-20 PROCEDURE — 80048 BASIC METABOLIC PNL TOTAL CA: CPT

## 2021-01-20 PROCEDURE — 74011250637 HC RX REV CODE- 250/637: Performed by: PHYSICIAN ASSISTANT

## 2021-01-20 PROCEDURE — 36415 COLL VENOUS BLD VENIPUNCTURE: CPT

## 2021-01-20 PROCEDURE — 65660000000 HC RM CCU STEPDOWN

## 2021-01-20 PROCEDURE — 99218 HC RM OBSERVATION: CPT

## 2021-01-20 PROCEDURE — 85025 COMPLETE CBC W/AUTO DIFF WBC: CPT

## 2021-01-20 PROCEDURE — 80061 LIPID PANEL: CPT

## 2021-01-20 PROCEDURE — 99238 HOSP IP/OBS DSCHRG MGMT 30/<: CPT | Performed by: INTERNAL MEDICINE

## 2021-01-20 RX ORDER — LISINOPRIL 10 MG/1
10 TABLET ORAL DAILY
Qty: 30 TAB | Refills: 3 | Status: SHIPPED | OUTPATIENT
Start: 2021-01-21 | End: 2021-04-28 | Stop reason: SDUPTHER

## 2021-01-20 RX ORDER — ATORVASTATIN CALCIUM 40 MG/1
40 TABLET, FILM COATED ORAL DAILY
Status: DISCONTINUED | OUTPATIENT
Start: 2021-01-21 | End: 2021-01-20 | Stop reason: HOSPADM

## 2021-01-20 RX ORDER — GUAIFENESIN 100 MG/5ML
81 LIQUID (ML) ORAL DAILY
Qty: 30 TAB | Refills: 0 | Status: SHIPPED | COMMUNITY
Start: 2021-01-20

## 2021-01-20 RX ORDER — ATORVASTATIN CALCIUM 40 MG/1
40 TABLET, FILM COATED ORAL DAILY
Qty: 30 TAB | Refills: 3 | Status: SHIPPED | OUTPATIENT
Start: 2021-01-21 | End: 2021-03-30 | Stop reason: ALTCHOICE

## 2021-01-20 RX ORDER — NITROGLYCERIN 0.4 MG/1
0.4 TABLET SUBLINGUAL
Qty: 1 BOTTLE | Refills: 1 | Status: SHIPPED | OUTPATIENT
Start: 2021-01-20 | End: 2022-01-25 | Stop reason: SDUPTHER

## 2021-01-20 RX ADMIN — ASPIRIN 81 MG: 81 TABLET, CHEWABLE ORAL at 08:35

## 2021-01-20 RX ADMIN — ATORVASTATIN CALCIUM 10 MG: 10 TABLET, FILM COATED ORAL at 08:35

## 2021-01-20 RX ADMIN — PANTOPRAZOLE SODIUM 40 MG: 40 TABLET, DELAYED RELEASE ORAL at 05:06

## 2021-01-20 RX ADMIN — HYDROCODONE BITARTRATE AND ACETAMINOPHEN 2 TABLET: 10; 325 TABLET ORAL at 08:35

## 2021-01-20 RX ADMIN — LISINOPRIL 10 MG: 5 TABLET ORAL at 08:35

## 2021-01-20 RX ADMIN — LEVOTHYROXINE SODIUM 75 MCG: 0.07 TABLET ORAL at 05:06

## 2021-01-20 RX ADMIN — Medication 10 ML: at 05:04

## 2021-01-20 RX ADMIN — TICAGRELOR 90 MG: 90 TABLET ORAL at 09:50

## 2021-01-20 NOTE — DISCHARGE SUMMARY
[facility] Cardiology Discharge Summary     Patient ID:  Pepper Cisneros  [id number]  64 y.o.  1960    Admit date: 1/19/2021    Discharge date and time:  1/20/2021    Admitting Physician: Elle Galeas MD     Discharge Physician: Dr. Felisha Ordoñez    Admission Diagnoses: NSTEMI (non-ST elevated myocardial infarction) [facility]) [I21.4]    Discharge Diagnoses:    Diagnosis    STEMI (non-ST elevated myocardial infarction) [address] [address]    HEALY (dyspnea on exertion)    HTN (hypertension)    Nicotine use disorder    FH: CAD (coronary artery disease)    Shortness of breath    Obesity, morbid ([facility] [address]    HLD (hyperlipidemia)    Anxiety     Cardiology Procedures this admission:  Left heart catheterization with PCI  EchoCardiogram  Consults: None    Hospital Course: Pt is a 22-year-old morbidly obese female with history of hypertension, dyslipidemia, obstructive sleep apnea, morbid obesity, COPD, and nicotine dependence with ongoing smoking. She presents with acute severe substernal chest pain, brought in by EMS. Initial EKG demonstrates nonspecific ST-T wave changes. Pain was severe and radiating to bilateral arms. She was given nitroglycerin and morphine with improvement in pain. Pain became increasingly worse in the emergency room. Troponins were noted to be elevated and the patient was taken in a semi urgent fashion to the cardiac catheterization lab by Dr Flaquito Hawthorne for non-ST-elevation myocardial infarction. Pt was found to have a 100% stenosis of JAYSHREE that was stented with a 2.25 x 22 mm Resolute Debary with 0% residual stenosis. Pt also found to have a 90% stenosis of pLAD that was stented with a 2.75 x 34 mm Resolute Onxy with 0% residual stenosis. During procedure patient had acute ventricular fibrillation in the face of acute ischemia, status post cardioversion and restoration of sinus bradycardia which responded to atropine. The patient is transferred back to the recovery area in stable condition.  The following morning Pt was up feeling well without any complaints of CP, SOB or palpitations. Pt's right radial cath site was clean, dry and intact without hematoma or bruit. Pt's labs were stable. Pt was seen and examined by Dr. Bertrand Sol and determined stable and ready for discharge. Patient was instructed on the importance of medication compliance including taking aspirin and Brilinta everyday without missing a dose. After receiving drug eluting stents, the patient will remain on dual anti-platelet therapy for 1 year. For maximized medical therapy for CAD, patient will continue  ACE-I, and statin as well. No beta blocker secondary to sinus bradycardia. The patient will follow up with [facility]iology -- Dr. Chester Murray on 1/25 at 12:00 pm at [facility]. Patient has been referred to cardiac rehab. DISPOSITION: The patient is being discharged home in stable condition on a low saturated fat, low cholesterol and low salt diet. Pt is instructed to advance activities as tolerated limited to fatigue or shortness of breath. Pt is instructed to do no heavy lifting, straining, stooping or squatting for 3-5 days. Pt is instructed to watch cath site for bleeding/oozing, if seen Pt is instructed to apply firm pressure with clean cloth and call office at [phone]. Pt is instructed to watch for signs of infection which include increasing area of redness around site, fever/hot to touch or purulent drainage. Pt is instructed not to soak in a tub bath for 1 week, but it is okay to shower. Pt is instructed to call office or return to ER for immediate evaluation of any shortness of breath or chest pain not relieved by NTG. Discharge Exam:   Visit Vitals  /64   Pulse 64   Temp 97.8 °F (36.6 °C)   Resp 18   Ht 5' 2\" (1.575 m)   Wt 100.4 kg (221 lb 4.8 oz)   SpO2 94%   BMI 40.48 kg/m²   Pt has been seen by Dr Bertrand Sol: see his progress note for exam details.     Recent Results (from the past 24 hour(s))   POC ACTIVATED CLOTTING TIME    Collection Time: 01/19/21 11:17 AM   Result Value Ref Range    Activated Clotting Time (POC) 538 (H) 70 - 128 SECS   CK    Collection Time: 01/19/21 12:45 PM   Result Value Ref Range    CK 1,170 (H) 21 - 215 U/L   TROPONIN-HIGH SENSITIVITY    Collection Time: 01/19/21 12:45 PM   Result Value Ref Range    Troponin-High Sensitivity 42,303.0 (HH) 0 - 14 pg/mL   CBC WITH AUTOMATED DIFF    Collection Time: 01/20/21  3:48 AM   Result Value Ref Range    WBC 7.7 4.3 - 11.1 K/uL    RBC 4.05 4.05 - 5.2 M/uL    HGB 12.5 11.7 - 15.4 g/dL    HCT 38.6 35.8 - 46.3 %    MCV 95.3 79.6 - 97.8 FL    MCH 30.9 26.1 - 32.9 PG    MCHC 32.4 31.4 - 35.0 g/dL    RDW 13.8 11.9 - 14.6 %    PLATELET 023 947 - 930 K/uL    MPV 11.5 9.4 - 12.3 FL    ABSOLUTE NRBC 0.00 0.0 - 0.2 K/uL    DF AUTOMATED      NEUTROPHILS 55 43 - 78 %    LYMPHOCYTES 36 13 - 44 %    MONOCYTES 7 4.0 - 12.0 %    EOSINOPHILS 2 0.5 - 7.8 %    BASOPHILS 0 0.0 - 2.0 %    IMMATURE GRANULOCYTES 0 0.0 - 5.0 %    ABS. NEUTROPHILS 4.2 1.7 - 8.2 K/UL    ABS. LYMPHOCYTES 2.8 0.5 - 4.6 K/UL    ABS. MONOCYTES 0.5 0.1 - 1.3 K/UL    ABS. EOSINOPHILS 0.2 0.0 - 0.8 K/UL    ABS. BASOPHILS 0.0 0.0 - 0.2 K/UL    ABS. IMM.  GRANS. 0.0 0.0 - 0.5 K/UL   LIPID PANEL    Collection Time: 01/20/21  3:48 AM   Result Value Ref Range    LIPID PROFILE          Cholesterol, total 167 <200 MG/DL    Triglyceride 119 35 - 150 MG/DL    HDL Cholesterol 39 (L) 40 - 60 MG/DL    LDL, calculated 104.2 (H) <100 MG/DL    VLDL, calculated 23.8 (H) 6.0 - 23.0 MG/DL    CHOL/HDL Ratio 4.3     METABOLIC PANEL, BASIC    Collection Time: 01/20/21  3:48 AM   Result Value Ref Range    Sodium 142 136 - 145 mmol/L    Potassium 4.0 3.5 - 5.1 mmol/L    Chloride 111 (H) 98 - 107 mmol/L    CO2 24 21 - 32 mmol/L    Anion gap 7 7 - 16 mmol/L    Glucose 91 65 - 100 mg/dL    BUN 12 6 - 23 MG/DL    Creatinine 0.76 0.6 - 1.0 MG/DL    GFR est AA >60 >60 ml/min/1.73m2    GFR est non-AA >60 >60 ml/min/1.73m2    Calcium 8.7 8.3 - 10.4 MG/DL     Patient Instructions:   Current Discharge Medication List      START taking these medications    Details   lisinopriL (PRINIVIL, ZESTRIL) 10 mg tablet Take 1 Tab by mouth daily. Qty: 30 Tab, Refills: 3      ticagrelor (BRILINTA) 90 mg tablet Take 1 Tab by mouth every twelve (12) hours every twelve (12) hours. Qty: 60 Tab, Refills: 11      nitroglycerin (NITROSTAT) 0.4 mg SL tablet 1 Tab by SubLINGual route every five (5) minutes as needed for Chest Pain. Up to 3 doses. Qty: 1 Bottle, Refills: 1         CONTINUE these medications which have CHANGED    Details   atorvastatin (LIPITOR) 40 mg tablet Take 1 Tab by mouth daily. Qty: 30 Tab, Refills: 3      aspirin 81 mg chewable tablet Take 1 Tab by mouth daily. Qty: 30 Tab, Refills: 0         CONTINUE these medications which have NOT CHANGED    Details   HYDROcodone-acetaminophen (NORCO)  mg tablet Take 2 Tabs by mouth three (3) times daily as needed for Pain. Max Daily Amount: 6 Tabs. Qty: 180 Tab, Refills: 0    Associated Diagnoses: Chronic pain syndrome      levothyroxine (SYNTHROID) 75 mcg tablet Take 1 Tab by mouth Daily (before breakfast). Except 1.5 on fridays . Qty: 92 Tab, Refills: 3    Associated Diagnoses: Hypothyroidism, unspecified type      albuterol (PROVENTIL HFA, VENTOLIN HFA, PROAIR HFA) 90 mcg/actuation inhaler Take 2 Puffs by inhalation every four (4) hours as needed for Wheezing. Qty: 1 Inhaler, Refills: 2      fluticasone (FLONASE) 50 mcg/actuation nasal spray 2 Sprays by Both Nostrils route daily.   Qty: 1 Bottle, Refills: prn         STOP taking these medications       phentermine (ADIPEX-P) 37.5 mg capsule Comments:   Reason for Stopping:               Signed:  TYESHA Stark  1/20/2021  9:51 AM

## 2021-01-20 NOTE — PROGRESS NOTES
[facility] CARDIOLOGY PROGRESS NOTE    1/20/2021 7:55 AM    Admit Date: 1/19/2021        Subjective:   Stable overnight without angina, CHF, or palpitations. Vitals stable and controlled. No other complaints overnight. Tolerating meds well. Objective:      Vitals:    01/19/21 1841 01/19/21 2120 01/20/21 0110 01/20/21 0510   BP: 114/63 130/60 (!) 129/54 120/61   Pulse: 81 62 (!) 51 67   Resp: 20 20 20 20   Temp: 98.1 °F (36.7 °C) 97.9 °F (36.6 °C) 97.8 °F (36.6 °C) 98.5 °F (36.9 °C)   SpO2: 95% 96% 96% 95%   Weight:    221 lb 4.8 oz (100.4 kg)   Height:           Physical Exam:  Neck- supple, no JVD  CV- regular rate and rhythm no MRG  Lung- clear bilaterally  Abd- soft, nontender, nondistended  Ext- no edema, right wrist CDI  Skin- warm and dry    Data Review:   Recent Labs     01/20/21  0348 01/19/21  0513    141   K 4.0 3.8   MG  --  2.2   BUN 12 11   CREA 0.76 0.82   GLU 91 119*   WBC 7.7 9.6   HGB 12.5 14.4   HCT 38.6 44.3    252   CHOL 167  --    TRIGL 119  --    HDL 39*  --        Assessment and Plan:    NSTEMI (non-ST elevated myocardial infarction) ([facility] [address] (1/19/2021)- doing well, ambulating room without angina or CHF. DAPT on board. Home late today if continues to do well. EF preserved. Active Problems:    HLD (hyperlipidemia) ()- increase statin at discharge with outpatient surveillance. GERD (gastroesophageal reflux disease) ()      BONNIE (obstructive sleep apnea) ()      Overview: Moderate: approved for Provigil. till 5/2019          HEALY (dyspnea on exertion) (1/19/2021)- resolved, follow up in office      HTN (hypertension) (1/19/2021)- controlled, no BB due to HR in 50's and 60's       Nicotine use disorder (1/19/2021)- cessation counseling as outpatient      FH: CAD (coronary artery disease) (1/19/2021)        ESCOBAR Santo MD  [facility] Cardiology  Pager [phone]

## 2021-01-20 NOTE — ROUTINE PROCESS
Discharge instructions reviewed with patient. Prescriptions given for brillinta, lisinopril, nitroglycerin, and atorvastatin and med info sheets provided for all new medications. Opportunity for questions provided. Patient voiced understanding of all discharge instructions. IV and telemetry box removed. Patient left with personal belongings, patient's family member to transport home.

## 2021-01-20 NOTE — ROUTINE PROCESS
Bedside shift change report given to [facility], [facility], RN and Marco A Chau RN (oncoming nurse) by Yael Cardona RN (offgoing nurse). Report included the following information SBAR, Kardex, MAR, Med Rec Status and Cardiac Rhythm SB and PAC.

## 2021-01-20 NOTE — DISCHARGE INSTRUCTIONS
Patient Education   The patient is being discharged home in stable condition on a low saturated fat, low cholesterol and low salt diet. Pt is instructed to advance activities as tolerated limited to fatigue or shortness of breath. Pt is instructed to do no heavy lifting, straining, stooping or squatting for 3-5 days. Pt is instructed to watch cath site for bleeding/oozing, if seen Pt is instructed to apply firm pressure with clean cloth and call office at [phone]. Pt is instructed to watch for signs of infection which include increasing area of redness around site, fever/hot to touch or purulent drainage. Pt is instructed not to soak in a tub bath for 1 week, but it is okay to shower. Pt is instructed to call office or return to ER for immediate evaluation of any shortness of breath or chest pain not relieved by NTG. Cardiac Catheterization/Angiography Discharge Instructions    *Check the puncture site frequently for swelling or bleeding. If you see any bleeding, lie down and apply pressure over the area with a clean town or washcloth. Notify your doctor for any redness, swelling, drainage or oozing from the puncture site. Notify your doctor for any fever or chills. *If the leg or arm with the puncture becomes cold, numb or painful, call [facility] Cardiology    *Activity should be limited for the next 48 hours. Climb stairs as little as possible and avoid any stooping, bending or strenuous activity for 48 hours. No heavy lifting (anything over 10 pounds) for three days. *Do not drive for 48 hours. *You may resume your usual diet. Drink more fluids than usual.    *Have a responsible person drive you home and stay with you for at least 24 hours after your heart catheterization/angiography. *You may remove the bandage from your Right and Arm in 24 hours. You may shower in 24 hours. No tub baths, hot tubs or swimming for one week.  Do not place any lotions, creams, powders, ointments over the puncture site for one week. You may place a clean band-aid over the puncture site each day for 5 days. Change this daily. Coronary Artery Disease: Care Instructions  Your Care Instructions     The heart is a muscle, and like any muscle, it needs blood to work well. Coronary artery disease occurs when the arteries that bring oxygen-rich blood to your heart have a buildup of plaque--deposits of fats and other substances. Plaque can reduce blood flow to the heart muscle. This can cause angina symptoms such as chest pain or pressure. A heart attack can happen if blood flow is completely blocked. You can do a lot to improve your health and prevent a heart attack. Eating healthy food, not smoking, getting regular exercise, and taking your medicine are the main things you can do every day to stay healthy. Follow-up care is a key part of your treatment and safety. Be sure to make and go to all appointments, and call your doctor if you are having problems. It's also a good idea to know your test results and keep a list of the medicines you take. How can you care for yourself at home? Medicines    · Be safe with medicines. Take your medicines exactly as prescribed. Call your doctor if you think you are having a problem with your medicine. You will get more details on the specific medicines your doctor prescribes.     · You will take medicines that lower your risk of a heart attack and lower your risk of dying early from heart disease. These medicines include:  ? Angiotensin-converting enzyme (ACE) inhibitors or angiotensin II receptor blockers (ARBs). They lower blood pressure. ? Aspirin and other blood thinners. They prevent blood clots that could cause a heart attack. ? Beta-blockers. They lower the heart's workload. ? Statins and other cholesterol medicines.  They lower cholesterol.     · If your doctor has given you nitroglycerin for angina symptoms (such as chest pain or pressure) keep it with you at all times. If you have symptoms, sit down and rest, and take the first dose of nitroglycerin as directed. If your symptoms get worse or are not getting better within 5 minutes, call 911 right away. Stay on the phone. The emergency  will give you further instructions.     · Do not take any over-the-counter medicines, vitamins, or herbal products without talking to your doctor first.   Lifestyle  Ask your doctor if a cardiac rehab program is right for you. Cardiac rehab can help you make lifestyle changes. In cardiac rehab, a team of health professionals provides education and support to help you make new, healthy habits.    · Do not smoke. Avoid secondhand smoke too. Smoking can increase your risk of a heart attack or stroke. If you need help quitting, talk to your doctor about stop-smoking programs and medicines. These can increase your chances of quitting for good.     · Eat heart-healthy foods. These include vegetables, fruits, nuts, beans, lean meat, fish, and whole grains. Limit saturated fat, sodium, and alcohol. Limit drinks and foods with added sugar.     · If your doctor recommends it, get more exercise. Ask your doctor what level of exercise is safe for you. Walking is a good choice. Bit by bit, increase the amount you walk every day. Try for at least 30 minutes on most days of the week. You also may want to swim, bike, or do other activities.     · Stay at a healthy weight. Lose weight if you need to.     · Manage other health problems. These include diabetes, high blood pressure, and high cholesterol. If you think you may have a problem with alcohol or drug use, talk to your doctor.     · If you have angina symptoms, pay attention to your symptoms. This can help you see what causes them and what is typical for you.     · Avoid colds and flu. Get a pneumococcal vaccine shot. If you have had one before, ask your doctor whether you need another dose. Get a flu vaccine every year.  If you must be around people with colds or flu, wash your hands often.     · If you think you have symptoms of depression, talk to your doctor. Symptoms include feeling sad or hopeless most of the time, or losing interest in activities that used to make you happy. When should you call for help? Call 911 anytime you think you may need emergency care. For example, call if:    · You have symptoms of a heart attack. These may include:  ? Chest pain or pressure, or a strange feeling in the chest.  ? Sweating. ? Shortness of breath. ? Nausea or vomiting. ? Pain, pressure, or a strange feeling in the back, neck, jaw, or upper belly or in one or both shoulders or arms. ? Lightheadedness or sudden weakness. ? A fast or irregular heartbeat. After you call 911, the  may tell you to chew 1 adult-strength or 2 to 4 low-dose aspirin. Wait for an ambulance. Do not try to drive yourself.     · You have angina symptoms (such as chest pain or pressure) that do not go away with rest or are not getting better within 5 minutes after you take a dose of nitroglycerin.     · You passed out (lost consciousness). Call your doctor now or seek immediate medical care if:    · You are having angina symptoms, such as chest pain or pressure, more often than usual, or they are different or worse than usual.     · You have new or increased shortness of breath.     · You are dizzy or lightheaded, or you feel like you may faint. Watch closely for changes in your health, and be sure to contact your doctor if you have any problems. Where can you learn more? Go to http://www.gray.com/  Enter R130 in the search box to learn more about \"Coronary Artery Disease: Care Instructions. \"  Current as of: December 16, 2019               Content Version: 12.6  © 6236-7622 [employer]. Care instructions adapted under license by [employer] (which disclaims liability or warranty for this information).  If you have questions about a medical condition or this instruction, always ask your healthcare professional. [address] any warranty or liability for your use of this information. Patient Education   Lisinopril (By mouth)   Lisinopril (lye-SIN-oh-pril)  Treats high blood pressure and heart failure. Also given to reduce the risk of death after a heart attack. This medicine is an ACE inhibitor. Brand Name(s): Prinivil, Qbrelis, Zestril   There may be other brand names for this medicine. When This Medicine Should Not Be Used: This medicine is not right for everyone. Do not use it if you had an allergic reaction to lisinopril or another ACE inhibitor, or if you are pregnant. How to Use This Medicine:   Liquid, Tablet  · Take your medicine as directed. Your dose may need to be changed several times to find what works best for you. · Oral liquid: Measure the oral liquid medicine with a marked measuring spoon, oral syringe, or medicine cup. · Missed dose: Take a dose as soon as you remember. If it is almost time for your next dose, wait until then and take a regular dose. Do not take extra medicine to make up for a missed dose. · Store the medicine in a closed container at room temperature, away from heat, moisture, and direct light. Drugs and Foods to Avoid:   Ask your doctor or pharmacist before using any other medicine, including over-the-counter medicines, vitamins, and herbal products. · Do not use this medicine together with aliskiren if you have diabetes. · Some foods and medicines may affect how lisinopril works.  Tell your doctor if you are using any of the following:   ¨ Aliskiren, everolimus, lithium, sirolimus, temsirolimus  ¨ Another blood pressure medicine, including an angiotensin receptor blocker (ARB)  ¨ Diuretic (water pill, including amiloride, spironolactone, triamterene)  ¨ Insulin or diabetes medicine  ¨ NSAID pain or arthritis medicine (including aspirin, celecoxib, diclofenac, ibuprofen, naproxen)  · Ask your doctor before you use any medicine, supplement, or salt substitute that contains potassium. Warnings While Using This Medicine:   · It is not safe to take this medicine during pregnancy. It could harm an unborn baby. Tell your doctor right away if you become pregnant. · Tell your doctor if you are breastfeeding, or if you have kidney disease, liver disease, diabetes, or heart or blood vessel disease. · This medicine may cause the following problems:  ¨ Angioedema (severe swelling)  ¨ Kidney problems  ¨ Serious liver problems  · This medicine could lower your blood pressure too much, especially when you first use it or if you are dehydrated. Stand or sit up slowly if you feel lightheaded or dizzy. · Do not stop using this medicine without asking your doctor, even if you feel well. This medicine will not cure your high blood pressure, but it will help keep it in a normal range. You may have to take blood pressure medicine for the rest of your life. · Tell any doctor or dentist who treats you that you are using this medicine. · Your doctor will do lab tests at regular visits to check on the effects of this medicine. Keep all appointments. · Keep all medicine out of the reach of children. Never share your medicine with anyone.   Possible Side Effects While Using This Medicine:   Call your doctor right away if you notice any of these side effects:  · Allergic reaction: Itching or hives, swelling in your face or hands, swelling or tingling in your mouth or throat, chest tightness, trouble breathing  · Blistering, peeling, or red skin rash  · Change in how much or how often you urinate  · Confusion, weakness, uneven heartbeat, trouble breathing, numbness or tingling in your hands, feet, or lips  · Dark urine or pale stools, nausea, vomiting, loss of appetite, stomach pain, yellow skin or eyes  · Fever, chills, sore throat, body aches  · Lightheadedness, dizziness, fainting  · Severe stomach pain (with or without nausea or vomiting)  If you notice these less serious side effects, talk with your doctor:   · Dry cough  If you notice other side effects that you think are caused by this medicine, tell your doctor. Call your doctor for medical advice about side effects. You may report side effects to FDA at 1-302-NGK-2262  © 2017 [facility] Information is for End User's use only and may not be sold, redistributed or otherwise used for commercial purposes. The above information is an  only. It is not intended as medical advice for individual conditions or treatments. Talk to your doctor, nurse or pharmacist before following any medical regimen to see if it is safe and effective for you. Patient Education      Ticagrelor (Brilinta) - (By mouth)   Why this medicine is used:   Helps prevent stroke, heart attack, and other heart problems. Contact a nurse or doctor right away if you have:  · Sudden or severe headache  · Shortness of breath, trouble breathing  · Bloody vomit or vomit that looks like coffee grounds; bloody or black, tarry stools  · Bleeding that does not stop or bruises that do not heal     Common side effects:  · Minor bleeding or bruising  · Headache  © 2017 [address] is for End User's use only and may not be sold, redistributed or otherwise used for commercial purposes. Patient Education   Nitroglycerin (By mouth)   Nitroglycerin (fyi-nsrl-NKQP-er-in)  Treats or prevents angina (chest pain). This medicine is a nitrate. Brand Name(s): Nitro-Time, Nitrostat   There may be other brand names for this medicine. When This Medicine Should Not Be Used: This medicine is not right for everyone. Do not use it if you had an allergic reaction to nitroglycerin or similar medicines.   How to Use This Medicine:   Long Acting Capsule, Tablet, Long Acting Tablet  · Your doctor will tell you how much medicine to use. Do not use more than directed. Sit down before you take this medicine, because it could make you lightheaded. · Most people use this medicine for only part of the day or as needed. · Extended-release capsule or extended-release tablet:   ¨ Take on an empty stomach with a full glass of water. ¨ Swallow whole. Do not crush, break, or chew it. · Buccal tablet:   ¨ Place it between your gum and upper cheek or upper lip. Let the tablet dissolve slowly in your mouth over several hours. Do not chew, crush, or swallow the tablet or put it under your tongue. ¨ Avoid drinking anything hot while the tablet is in your mouth and do not touch the tablet with your tongue. ¨ Do not go to sleep with a buccal tablet in your mouth. · Sublingual tablet:   ¨ Wet the tablet with saliva and place it under your tongue or inside your cheek. Let the tablet dissolve. Do not chew, crush, or swallow the tablet. Wait 5 minutes. If you still have pain, take a second tablet. Do not take more than 3 tablets in 15 minutes. If you still have pain after you take a total of 3 tablets, this is an emergency. Call 911. Do not drive yourself to the hospital.  ¨ You may use this medicine 5 to 10 minutes before an activity that can cause angina. This may help prevent the attack. · Read and follow the patient instructions that come with this medicine. Talk to your doctor or pharmacist if you have any questions. · Missed dose: Take a dose as soon as you remember. If it is almost time for your next dose, wait until then and take a regular dose. Do not take extra medicine to make up for a missed dose. · Store the medicine in a closed container at room temperature, away from heat, moisture, and direct light. Store the sublingual tablets at room temperature in the original glass container, away from heat, moisture, and direct light.   How to Store and Dispose of This Medicine:   · Store the medicine at room temperature in a closed container, away from heat, moisture, and direct light. Ask your pharmacist, doctor, or health caregiver about the best way to dispose of any outdated medicine or medicine no longer needed. · Keep all medicine out of the reach of children and never share your medicine with anyone. Drugs and Foods to Avoid:   Ask your doctor or pharmacist before using any other medicine, including over-the-counter medicines, vitamins, and herbal products. · Do not use this medicine if you are also using avanafil, riociguat, sildenafil, tadalafil, or vardenafil. · Some medicines can affect how nitroglycerin works. Tell your doctor if you are using any of the following:  ¨ Alteplase, aspirin, heparin  ¨ Blood pressure medicine  ¨ Diuretic (water pill)  ¨ Ergot medicine  · Tell your doctor if you are using any medicine that makes your mouth dry, such as medicines that treat depression. · Do not drink alcohol while you are using this medicine. Warnings While Using This Medicine:   · Tell your doctor if you are pregnant or breastfeeding, or if you have kidney disease, anemia, congestive heart failure, enlarged heart, low blood pressure, or a recent heart attack. Tell your doctor if you have a history of a head injury. · This medicine may make you dizzy or lightheaded. Do not drive or do anything else that could be dangerous until you know how this medicine affects you. These symptoms may be worse if you drink alcohol. · Medicines that treat chest pain sometimes cause headaches when you first start using it. This is normal. Do not stop using the medicine or change the time you use it to avoid headaches. Ask your doctor if you can take aspirin or acetaminophen to treat the headache. · Tell any doctor or dentist who treats you that you are using this medicine. This medicine may affect certain medical test results. · Keep all medicine out of the reach of children. Never share your medicine with anyone.   Possible Side Effects While Using This Medicine:   Call your doctor right away if you notice any of these side effects:  · Allergic reaction: Itching or hives, swelling in your face or hands, swelling or tingling in your mouth or throat, chest tightness, trouble breathing  · Blurred vision, dry mouth  · Increased chest pain, fast or slow heartbeat  · Severe or ongoing dizziness, lightheadedness, or fainting  · Throbbing, severe, or ongoing headache, confusion, low fever, or trouble seeing  · Trouble breathing, cold sweat, blue skin, lips, or nails  · Warmth or redness in your face, neck, arms, or upper chest  If you notice these less serious side effects, talk with your doctor:   · Nausea, vomiting, weakness  If you notice other side effects that you think are caused by this medicine, tell your doctor.   Call your doctor for medical advice about side effects. You may report side effects to FDA at 0-300-FDA-2335  © 2017 [employer] Information is for End User's use only and may not be sold, redistributed or otherwise used for commercial purposes.  The above information is an  only. It is not intended as medical advice for individual conditions or treatments. Talk to your doctor, nurse or pharmacist before following any medical regimen to see if it is safe and effective for you.

## 2021-01-20 NOTE — ROUTINE PROCESS
16cm x 7cm  Washout 8/1 in OR  Wound Vac placed with surgery following  Monitor drainage on negative pressure Bedside and Verbal shift change report given to self(oncoming nurse) by Cherylle Krabbe RN(offgoing nurse). Report included the following information SBAR, Kardex, MAR and Recent Results.

## 2021-01-25 PROBLEM — I21.4 NSTEMI (NON-ST ELEVATED MYOCARDIAL INFARCTION) (HCC): Status: RESOLVED | Noted: 2021-01-19 | Resolved: 2021-01-25

## 2021-01-25 PROBLEM — I25.10 CORONARY ARTERY DISEASE INVOLVING NATIVE CORONARY ARTERY OF NATIVE HEART WITHOUT ANGINA PECTORIS: Status: ACTIVE | Noted: 2021-01-25

## 2021-03-31 ENCOUNTER — HOSPITAL ENCOUNTER (OUTPATIENT)
Dept: LAB | Age: 61
Discharge: HOME | End: 2021-03-31
Payer: MEDICARE

## 2021-03-31 DIAGNOSIS — I25.10 CORONARY ARTERY DISEASE INVOLVING NATIVE CORONARY ARTERY OF NATIVE HEART WITHOUT ANGINA PECTORIS: ICD-10-CM

## 2021-03-31 LAB
ALBUMIN SERPL-MCNC: 3.2 G/DL (ref 3.5–5)
ALBUMIN/GLOB SERPL: 0.9 {RATIO} (ref 1.2–3.5)
ALP SERPL-CCNC: 73 U/L (ref 50–136)
ALT SERPL-CCNC: 18 U/L (ref 12–65)
ANION GAP SERPL CALC-SCNC: 3 MMOL/L (ref 7–16)
AST SERPL-CCNC: 12 U/L (ref 15–37)
BASOPHILS # BLD: 0 K/UL (ref 0–0.2)
BASOPHILS NFR BLD: 1 % (ref 0–2)
BILIRUB SERPL-MCNC: 0.3 MG/DL (ref 0.2–1.1)
BUN SERPL-MCNC: 10 MG/DL (ref 6–23)
CALCIUM SERPL-MCNC: 9 MG/DL (ref 8.3–10.4)
CHLORIDE SERPL-SCNC: 112 MMOL/L (ref 98–107)
CHOLEST SERPL-MCNC: 185 MG/DL
CO2 SERPL-SCNC: 27 MMOL/L (ref 21–32)
CREAT SERPL-MCNC: 0.72 MG/DL (ref 0.6–1)
DIFFERENTIAL METHOD BLD: NORMAL
EOSINOPHIL # BLD: 0.4 K/UL (ref 0–0.8)
EOSINOPHIL NFR BLD: 5 % (ref 0.5–7.8)
ERYTHROCYTE [DISTWIDTH] IN BLOOD BY AUTOMATED COUNT: 14.6 % (ref 11.9–14.6)
GLOBULIN SER CALC-MCNC: 3.5 G/DL (ref 2.3–3.5)
GLUCOSE SERPL-MCNC: 82 MG/DL (ref 65–100)
HCT VFR BLD AUTO: 39.7 % (ref 35.8–46.3)
HDLC SERPL-MCNC: 49 MG/DL (ref 40–60)
HDLC SERPL: 3.8 {RATIO}
HGB BLD-MCNC: 12.9 G/DL (ref 11.7–15.4)
IMM GRANULOCYTES # BLD AUTO: 0 K/UL (ref 0–0.5)
IMM GRANULOCYTES NFR BLD AUTO: 0 % (ref 0–5)
LDLC SERPL CALC-MCNC: 110.2 MG/DL
LIPID PROFILE,FLP: ABNORMAL
LYMPHOCYTES # BLD: 3.1 K/UL (ref 0.5–4.6)
LYMPHOCYTES NFR BLD: 42 % (ref 13–44)
MAGNESIUM SERPL-MCNC: 2.3 MG/DL (ref 1.8–2.4)
MCH RBC QN AUTO: 30.6 PG (ref 26.1–32.9)
MCHC RBC AUTO-ENTMCNC: 32.5 G/DL (ref 31.4–35)
MCV RBC AUTO: 94.1 FL (ref 79.6–97.8)
MONOCYTES # BLD: 0.5 K/UL (ref 0.1–1.3)
MONOCYTES NFR BLD: 7 % (ref 4–12)
NEUTS SEG # BLD: 3.3 K/UL (ref 1.7–8.2)
NEUTS SEG NFR BLD: 45 % (ref 43–78)
NRBC # BLD: 0 K/UL (ref 0–0.2)
PLATELET # BLD AUTO: 272 K/UL (ref 150–450)
PMV BLD AUTO: 11.5 FL (ref 9.4–12.3)
POTASSIUM SERPL-SCNC: 4.2 MMOL/L (ref 3.5–5.1)
PROT SERPL-MCNC: 6.7 G/DL (ref 6.3–8.2)
RBC # BLD AUTO: 4.22 M/UL (ref 4.05–5.2)
SODIUM SERPL-SCNC: 142 MMOL/L (ref 136–145)
TRIGL SERPL-MCNC: 129 MG/DL (ref 35–150)
TSH SERPL DL<=0.005 MIU/L-ACNC: 1.46 UIU/ML (ref 0.36–3.74)
VLDLC SERPL CALC-MCNC: 25.8 MG/DL (ref 6–23)
WBC # BLD AUTO: 7.4 K/UL (ref 4.3–11.1)

## 2021-03-31 PROCEDURE — 85025 COMPLETE CBC W/AUTO DIFF WBC: CPT

## 2021-03-31 PROCEDURE — 80061 LIPID PANEL: CPT

## 2021-03-31 PROCEDURE — 84443 ASSAY THYROID STIM HORMONE: CPT

## 2021-03-31 PROCEDURE — 80053 COMPREHEN METABOLIC PANEL: CPT

## 2021-03-31 PROCEDURE — 36415 COLL VENOUS BLD VENIPUNCTURE: CPT

## 2021-03-31 PROCEDURE — 83735 ASSAY OF MAGNESIUM: CPT

## 2021-12-04 ENCOUNTER — HOSPITAL ENCOUNTER (EMERGENCY)
Age: 61
Discharge: LEFT BEFORE DISCHARGE | End: 2021-12-04
Payer: MEDICARE

## 2021-12-04 VITALS
TEMPERATURE: 97.7 F | HEART RATE: 65 BPM | HEIGHT: 62 IN | DIASTOLIC BLOOD PRESSURE: 81 MMHG | OXYGEN SATURATION: 100 % | BODY MASS INDEX: 38.64 KG/M2 | RESPIRATION RATE: 16 BRPM | WEIGHT: 210 LBS | SYSTOLIC BLOOD PRESSURE: 154 MMHG

## 2021-12-04 PROCEDURE — 75810000275 HC EMERGENCY DEPT VISIT NO LEVEL OF CARE

## 2021-12-04 NOTE — ED TRIAGE NOTES
Patient ambulatory to triage with mask in place. Patient reports she tested positive for COVID on Friday. Pt reports her sx started 11/28. Pt is here for antibodies. Pt is not vaccinated.

## 2022-03-18 PROBLEM — Z72.0 TOBACCO USE: Status: ACTIVE | Noted: 2018-05-01

## 2022-03-18 PROBLEM — R91.1 PULMONARY NODULE: Status: ACTIVE | Noted: 2017-11-28

## 2022-03-18 PROBLEM — R06.09 DOE (DYSPNEA ON EXERTION): Status: ACTIVE | Noted: 2021-01-19

## 2022-03-19 PROBLEM — I10 HTN (HYPERTENSION): Status: ACTIVE | Noted: 2021-01-19

## 2022-03-19 PROBLEM — I25.10 CORONARY ARTERY DISEASE INVOLVING NATIVE CORONARY ARTERY OF NATIVE HEART WITHOUT ANGINA PECTORIS: Status: ACTIVE | Noted: 2021-01-25

## 2022-03-19 PROBLEM — R06.02 SHORTNESS OF BREATH: Status: ACTIVE | Noted: 2018-05-01

## 2022-03-19 PROBLEM — F17.200 NICOTINE USE DISORDER: Status: ACTIVE | Noted: 2021-01-19

## 2022-03-20 PROBLEM — E66.01 OBESITY, MORBID (HCC): Status: ACTIVE | Noted: 2017-11-28

## 2022-03-20 PROBLEM — Z82.49 FH: CAD (CORONARY ARTERY DISEASE): Status: ACTIVE | Noted: 2021-01-19

## 2022-10-13 ENCOUNTER — OFFICE VISIT (OUTPATIENT)
Dept: CARDIOLOGY CLINIC | Age: 62
End: 2022-10-13
Payer: COMMERCIAL

## 2022-10-13 VITALS
BODY MASS INDEX: 41.44 KG/M2 | WEIGHT: 225.2 LBS | HEART RATE: 54 BPM | HEIGHT: 62 IN | SYSTOLIC BLOOD PRESSURE: 210 MMHG | DIASTOLIC BLOOD PRESSURE: 108 MMHG

## 2022-10-13 DIAGNOSIS — G47.33 OSA (OBSTRUCTIVE SLEEP APNEA): ICD-10-CM

## 2022-10-13 DIAGNOSIS — Z72.0 TOBACCO USE: ICD-10-CM

## 2022-10-13 DIAGNOSIS — R07.2 PRECORDIAL PAIN: Primary | ICD-10-CM

## 2022-10-13 DIAGNOSIS — F17.200 NICOTINE USE DISORDER: ICD-10-CM

## 2022-10-13 DIAGNOSIS — I10 PRIMARY HYPERTENSION: ICD-10-CM

## 2022-10-13 DIAGNOSIS — I25.10 CORONARY ARTERY DISEASE INVOLVING NATIVE CORONARY ARTERY OF NATIVE HEART WITHOUT ANGINA PECTORIS: ICD-10-CM

## 2022-10-13 PROCEDURE — 99214 OFFICE O/P EST MOD 30 MIN: CPT | Performed by: INTERNAL MEDICINE

## 2022-10-13 PROCEDURE — 93000 ELECTROCARDIOGRAM COMPLETE: CPT | Performed by: INTERNAL MEDICINE

## 2022-10-13 RX ORDER — LISINOPRIL 20 MG/1
20 TABLET ORAL 2 TIMES DAILY
Qty: 180 TABLET | Refills: 1 | Status: SHIPPED | OUTPATIENT
Start: 2022-10-13

## 2022-10-13 NOTE — PROGRESS NOTES
[address], [address] [facility], [address] [place] [address]  PHONE: [phone]     10/13/22    NAME:  Apryl Saunders  : 1960  MRN: [id number]       SUBJECTIVE:   Apryl Saunders is a 63 y.o. female seen for a follow up visit regarding the following:     Chief Complaint   Patient presents with    Hypertension     6 month follow up     Chest Pain     Took 2 NTG yesterday for CP       HPI:    NSTEMI : PCI x 2 complicated by Vfib Arrest   Echo 2021: normal EF. NST 2021: low risk      Still smoking, some CHOUDHARY, not severe or worsening. Some CP at times now. NO new edema. BP higher. Patient denies recent history of orthopnea, PND, excessive dizziness and/or syncope. Brilinta too costly for her.  now, spouse was my patient. Past Medical History, Past Surgical History, Family history, Social History, and Medications were all reviewed with the patient today and updated as necessary. Current Outpatient Medications   Medication Sig Dispense Refill    lisinopril (PRINIVIL;ZESTRIL) 20 MG tablet Take 1 tablet by mouth 2 times daily 180 tablet 1    albuterol sulfate  (90 Base) MCG/ACT inhaler Inhale 2 puffs into the lungs every 4 hours as needed      aspirin 81 MG chewable tablet Take 81 mg by mouth daily      fluticasone (FLONASE) 50 MCG/ACT nasal spray 2 sprays by Nasal route daily      HYDROcodone-acetaminophen (NORCO)  MG per tablet Take 2 tablets by mouth 3 times daily as needed. ibuprofen (ADVIL;MOTRIN) 600 MG tablet Take by mouth every 6 hours as needed      levothyroxine (SYNTHROID) 75 MCG tablet Take 75 mcg by mouth every morning (before breakfast)      nitroGLYCERIN (NITROSTAT) 0.4 MG SL tablet Place 0.4 mg under the tongue      rosuvastatin (CRESTOR) 10 MG tablet Take 10 mg by mouth       No current facility-administered medications for this visit.         Allergies   Allergen Reactions    Citalopram Other (See Comments)     Headache Gabapentin Other (See Comments)     Taking two would cause nightmares    Milnacipran Other (See Comments)     Headache    Pramipexole Nausea Only and Other (See Comments)     Abdominal pain    Varenicline Other (See Comments)     \"Couldn't take\"    Sulfa Antibiotics Nausea And Vomiting     Patient Active Problem List    Diagnosis Date Noted    Coronary artery disease involving native coronary artery of native heart without angina pectoris 2021    CHOUDHARY (dyspnea on exertion) 2021    Nicotine use disorder 2021    HTN (hypertension) 2021    FH: CAD (coronary artery disease) 2021    JAMES (obstructive sleep apnea)      Moderate: approved for Provigil. till 2019         GERD (gastroesophageal reflux disease)     HLD (hyperlipidemia)     Tobacco use 2018    Shortness of breath 2018    Backache     Pulmonary nodule 2017    Obesity, morbid ([address] 2017    Other specified hypothyroidism     Recurrent major depressive disorder, in partial remission (HCC)     Abnormal abdominal CT scan     Restless legs     Postmenopausal disorder     Calcium kidney stones      Probable diagnosis        Neck pain     Cyst of joint of shoulder     Spasm of colon     Night sweats     Low vitamin B12 level     Cervical strain     Chronic right shoulder pain     Anxiety     Acne     Generalized pain     Chronic pain      arthritis in back and legs        Osteopenia     Arthritis     Encounter for long-term (current) use of other medications     Microhematuria 2014      Past Surgical History:   Procedure Laterality Date    APPENDECTOMY       SECTION      x 2    CHOLECYSTECTOMY      COLOSTOMY      COLOSTOMY CLOSURE      HERNIA REPAIR  2010    [facility]- Dr. Augustine Finical      drainage of diverticular abscess    OTHER SURGICAL HISTORY      Laser Conization    TUBAL LIGATION Bilateral     with 2nd     US GUIDED CORE BREAST BIOPSY Bilateral 2012    Sterotactic, benign biopsy report--both showed dystropic calcifications, sclerosing adenosis and ductal hyperplasia     Family History   Problem Relation Age of Onset    Stroke Maternal Grandmother     Heart Disease Father     Cancer Brother         Pancreatic    Heart Attack Brother 62        with stent    Alcohol Abuse Brother     Hypertension Maternal Grandmother     Diabetes Maternal Grandmother     Heart Attack Sister 43        with stent    Stroke Sister     Hypertension Sister     Thyroid Disease Sister     Cancer Sister         Pancreatic    Breast Cancer Neg Hx     Hypertension Mother     Heart Disease Mother         hole in heart; top part swollen    Thyroid Disease Mother     Diabetes Father     Heart Attack Father 61    Cancer Maternal Grandmother         lymph nodes     Social History     Tobacco Use    Smoking status: Every Day     Packs/day: 1.00     Types: Cigarettes    Smokeless tobacco: Never   Substance Use Topics    Alcohol use: No         ROS:    No obvious pertinent positives on review of systems except for what was outlined in the HPI today. PHYSICAL EXAM:     BP (!) 210/108 Comment: 5 minute recheck  Pulse 54 Comment: via EKG report  Ht 5' 2\" (1.575 m)   Wt 225 lb 3.2 oz (102.2 kg)   BMI 41.19 kg/m²    General/Constitutional:   Alert and oriented x 3, no acute distress  HEENT:   normocephalic, atraumatic, moist mucous membranes  Neck:   No JVD or carotid bruits bilaterally  Cardiovascular:   regular rate and rhythm, no murmur/rub/gallop appreciated  Pulmonary:   clear to auscultation bilaterally, no respiratory distress  Abdomen:   soft, non-tender, non-distended  Ext:   No sig LE edema bilaterally  Skin:  warm and dry, no obvious rashes seen  Neuro:   no obvious sensory or motor deficits  Psychiatric:   normal mood and affect      EKG Today and independently reviewed by me: sinus rhythm, normal intervals and non-specific ST/T wave changes.         Lab Results   Component Value Date/Time     03/31/2021 08:31 AM    K 4.2 03/31/2021 08:31 AM     03/31/2021 08:31 AM    CO2 27 03/31/2021 08:31 AM    BUN 10 03/31/2021 08:31 AM    CREATININE 0.72 03/31/2021 08:31 AM    GLUCOSE 82 03/31/2021 08:31 AM    CALCIUM 9.0 03/31/2021 08:31 AM        Lab Results   Component Value Date    WBC 7.4 03/31/2021    HGB 12.9 03/31/2021    HCT 39.7 03/31/2021    MCV 94.1 03/31/2021     03/31/2021       Lab Results   Component Value Date    TSH 1.460 03/31/2021       No results found for: LABA1C  No results found for: EAG    Lab Results   Component Value Date    CHOL 185 03/31/2021    CHOL 167 01/20/2021     Lab Results   Component Value Date    TRIG 129 03/31/2021    TRIG 119 01/20/2021     Lab Results   Component Value Date    HDL 49 03/31/2021    HDL 39 (L) 01/20/2021     Lab Results   Component Value Date    LDLCALC 110.2 (H) 03/31/2021    LDLCALC 104.2 (H) 01/20/2021     Lab Results   Component Value Date    LABVLDL 25.8 (H) 03/31/2021    LABVLDL 23.8 (H) 01/20/2021     Lab Results   Component Value Date    CHOLHDLRATIO 3.8 03/31/2021    CHOLHDLRATIO 4.3 01/20/2021           I have Independently reviewed prior care notes, any ER records available, cardiac testing, labs and results with the patient and before seeing the patient today. Also independently reviewed outside records when available. ASSESSMENT:    Oscar Patel was seen today for hypertension and chest pain. Diagnoses and all orders for this visit:    Chest pain  -     EKG 12 lead  -     Nuclear stress test with myocardial perfusion; Future    Coronary artery disease involving native coronary artery of native heart without angina pectoris  -     Nuclear stress test with myocardial perfusion; Future    Primary hypertension    JAMES (obstructive sleep apnea)    Nicotine use disorder    Tobacco use    Other orders  -     lisinopril (PRINIVIL;ZESTRIL) 20 MG tablet; Take 1 tablet by mouth 2 times daily        PLAN:     CAD:  check NST, some CP.  Given these risk factors and symptoms as outlined, plan for NST. We did review options of NST, LHC, other stress testing and agreed to proceed with NST in our office. To ER for worsening angina. Plan on definitive LHC for worsening angina. HTN: higher in office. Inc lis to 20 BID. Coreg BID as well, HR low. HPL: remain on crestor, goal LDL < 70. Tobacco Use:  needs cessation  JAMES:  cannot wear CPAP. Patient has been instructed and agrees to call our office with any issues or other concerns related to their cardiac condition(s) and/or complaint(s).         Return for Return After Test.       JV MANLEY,   10/13/2022

## 2022-11-10 ENCOUNTER — TELEPHONE (OUTPATIENT)
Dept: CARDIOLOGY CLINIC | Age: 62
End: 2022-11-10

## 2022-11-22 ENCOUNTER — OFFICE VISIT (OUTPATIENT)
Dept: CARDIOLOGY CLINIC | Age: 62
End: 2022-11-22
Payer: COMMERCIAL

## 2022-11-22 VITALS
BODY MASS INDEX: 41.04 KG/M2 | WEIGHT: 223 LBS | DIASTOLIC BLOOD PRESSURE: 80 MMHG | SYSTOLIC BLOOD PRESSURE: 134 MMHG | HEART RATE: 60 BPM | HEIGHT: 62 IN

## 2022-11-22 DIAGNOSIS — I25.10 CORONARY ARTERY DISEASE INVOLVING NATIVE CORONARY ARTERY OF NATIVE HEART WITHOUT ANGINA PECTORIS: Primary | ICD-10-CM

## 2022-11-22 DIAGNOSIS — Z72.0 TOBACCO USE: ICD-10-CM

## 2022-11-22 DIAGNOSIS — Z82.49 FH: CAD (CORONARY ARTERY DISEASE): ICD-10-CM

## 2022-11-22 DIAGNOSIS — F17.200 NICOTINE USE DISORDER: ICD-10-CM

## 2022-11-22 DIAGNOSIS — G47.33 OSA (OBSTRUCTIVE SLEEP APNEA): ICD-10-CM

## 2022-11-22 DIAGNOSIS — I10 PRIMARY HYPERTENSION: ICD-10-CM

## 2022-11-22 PROCEDURE — 3075F SYST BP GE 130 - 139MM HG: CPT | Performed by: INTERNAL MEDICINE

## 2022-11-22 PROCEDURE — 3078F DIAST BP <80 MM HG: CPT | Performed by: INTERNAL MEDICINE

## 2022-11-22 PROCEDURE — 99214 OFFICE O/P EST MOD 30 MIN: CPT | Performed by: INTERNAL MEDICINE

## 2022-11-22 NOTE — PROGRESS NOTES
[address], [address]  PHONE: [phone]     22    NAME:  Nabeel Castañeda  : 1960  MRN: [id number]       SUBJECTIVE:   Nabeel Castañeda is a 63 y.o. female seen for a follow up visit regarding the following:     Chief Complaint   Patient presents with    Coronary Artery Disease     Clite         HPI:    NSTEMI : PCI x 2 complicated by Vfib Arrest   Echo 2021: normal EF. NST 10/2022: Findings suggest a low risk of myocardial ischemia. Still smoking, no CP, pressure. NO new edema. taking Ace at night, feeling 100% better. CHOUDHARY the same now. No new angina. Patient denies recent history of orthopnea, PND, excessive dizziness and/or syncope. Brilinta too costly for her.  now, spouse was my patient. Past Medical History, Past Surgical History, Family history, Social History, and Medications were all reviewed with the patient today and updated as necessary. Current Outpatient Medications   Medication Sig Dispense Refill    lisinopril (PRINIVIL;ZESTRIL) 20 MG tablet Take 1 tablet by mouth 2 times daily 180 tablet 1    albuterol sulfate  (90 Base) MCG/ACT inhaler Inhale 2 puffs into the lungs every 4 hours as needed      aspirin 81 MG chewable tablet Take 81 mg by mouth daily      fluticasone (FLONASE) 50 MCG/ACT nasal spray 2 sprays by Nasal route daily      HYDROcodone-acetaminophen (NORCO)  MG per tablet Take 2 tablets by mouth 3 times daily as needed. ibuprofen (ADVIL;MOTRIN) 600 MG tablet Take by mouth every 6 hours as needed      levothyroxine (SYNTHROID) 75 MCG tablet Take 75 mcg by mouth every morning (before breakfast)      nitroGLYCERIN (NITROSTAT) 0.4 MG SL tablet Place 0.4 mg under the tongue      rosuvastatin (CRESTOR) 10 MG tablet Take 10 mg by mouth       No current facility-administered medications for this visit.         Allergies   Allergen Reactions    Citalopram Other (See Comments)     Headache    Gabapentin Other (See Comments)     Taking two would cause nightmares    Milnacipran Other (See Comments)     Headache    Pramipexole Nausea Only and Other (See Comments)     Abdominal pain    Varenicline Other (See Comments)     \"Couldn't take\"    Sulfa Antibiotics Nausea And Vomiting     Patient Active Problem List    Diagnosis Date Noted    Coronary artery disease involving native coronary artery of native heart without angina pectoris 2021    CHOUDHARY (dyspnea on exertion) 2021    Nicotine use disorder 2021    HTN (hypertension) 2021    FH: CAD (coronary artery disease) 2021    JAMES (obstructive sleep apnea)      Moderate: approved for Provigil. till 2019         GERD (gastroesophageal reflux disease)     HLD (hyperlipidemia)     Tobacco use 2018    Shortness of breath 2018    Backache     Pulmonary nodule 2017    Obesity, morbid ([address] 2017    Other specified hypothyroidism     Recurrent major depressive disorder, in partial remission (HCC)     Abnormal abdominal CT scan     Restless legs     Postmenopausal disorder     Calcium kidney stones      Probable diagnosis        Neck pain     Cyst of joint of shoulder     Spasm of colon     Night sweats     Low vitamin B12 level     Cervical strain     Chronic right shoulder pain     Anxiety     Acne     Generalized pain     Chronic pain      arthritis in back and legs        Osteopenia     Arthritis     Encounter for long-term (current) use of other medications     Microhematuria 2014      Past Surgical History:   Procedure Laterality Date    APPENDECTOMY       SECTION      x 2    CHOLECYSTECTOMY      COLOSTOMY      COLOSTOMY CLOSURE      HERNIA REPAIR  2010    [facility]- Dr. Jessica Martinez      drainage of diverticular abscess    OTHER SURGICAL HISTORY      Laser Conization    TUBAL LIGATION Bilateral     with 2nd     US GUIDED CORE BREAST BIOPSY Bilateral 1/2012    Sterotactic, benign biopsy report--both showed dystropic calcifications, sclerosing adenosis and ductal hyperplasia     Family History   Problem Relation Age of Onset    Stroke Maternal Grandmother     Heart Disease Father     Cancer Brother         Pancreatic    Heart Attack Brother 62        with stent    Alcohol Abuse Brother     Hypertension Maternal Grandmother     Diabetes Maternal Grandmother     Heart Attack Sister 43        with stent    Stroke Sister     Hypertension Sister     Thyroid Disease Sister     Cancer Sister         Pancreatic    Breast Cancer Neg Hx     Hypertension Mother     Heart Disease Mother         hole in heart; top part swollen    Thyroid Disease Mother     Diabetes Father     Heart Attack Father 61    Cancer Maternal Grandmother         lymph nodes     Social History     Tobacco Use    Smoking status: Every Day     Packs/day: 1.00     Types: Cigarettes    Smokeless tobacco: Never   Substance Use Topics    Alcohol use: No         ROS:    No obvious pertinent positives on review of systems except for what was outlined in the HPI today.       PHYSICAL EXAM:     /80   Pulse 60   Ht 5' 2\" (1.575 m)   Wt 223 lb (101.2 kg)   BMI 40.79 kg/m²    General/Constitutional:   Alert and oriented x 3, no acute distress  HEENT:   normocephalic, atraumatic, moist mucous membranes  Neck:   No JVD or carotid bruits bilaterally  Cardiovascular:   regular rate and rhythm, no murmur/rub/gallop appreciated  Pulmonary:   clear to auscultation bilaterally, no respiratory distress  Abdomen:   soft, non-tender, non-distended  Ext:   No sig LE edema bilaterally  Skin:  warm and dry, no obvious rashes seen  Neuro:   no obvious sensory or motor deficits  Psychiatric:   normal mood and affect      Lab Results   Component Value Date/Time     03/31/2021 08:31 AM    K 4.2 03/31/2021 08:31 AM     03/31/2021 08:31 AM    CO2 27 03/31/2021 08:31 AM    BUN 10 03/31/2021 08:31 AM    CREATININE 0.72 03/31/2021 08:31 AM    GLUCOSE 82 03/31/2021 08:31 AM    CALCIUM 9.0 03/31/2021 08:31 AM        Lab Results   Component Value Date    WBC 7.4 03/31/2021    HGB 12.9 03/31/2021    HCT 39.7 03/31/2021    MCV 94.1 03/31/2021     03/31/2021       Lab Results   Component Value Date    TSH 1.460 03/31/2021       No results found for: LABA1C  No results found for: EAG    Lab Results   Component Value Date    CHOL 185 03/31/2021    CHOL 167 01/20/2021     Lab Results   Component Value Date    TRIG 129 03/31/2021    TRIG 119 01/20/2021     Lab Results   Component Value Date    HDL 49 03/31/2021    HDL 39 (L) 01/20/2021     Lab Results   Component Value Date    LDLCALC 110.2 (H) 03/31/2021    LDLCALC 104.2 (H) 01/20/2021     Lab Results   Component Value Date    LABVLDL 25.8 (H) 03/31/2021    LABVLDL 23.8 (H) 01/20/2021     Lab Results   Component Value Date    CHOLHDLRATIO 3.8 03/31/2021    CHOLHDLRATIO 4.3 01/20/2021           I have Independently reviewed prior care notes, any ER records available, cardiac testing, labs and results with the patient and before seeing the patient today. Also independently reviewed outside records when available. ASSESSMENT:    Rosalee King was seen today for coronary artery disease. Diagnoses and all orders for this visit:    Coronary artery disease involving native coronary artery of native heart without angina pectoris    Primary hypertension    JAMES (obstructive sleep apnea)    FH: CAD (coronary artery disease)    Nicotine use disorder    Tobacco use        PLAN:   CAD: Patient presents for followup of recent cardiac stress testing. The stress test showed normal LV function with no evidence of ischemia. We discussed the reassuring results of the stress test.  We also discused the importance of ongoing risk factor modification for the prevention of future cardiovascular events. A healthy lifestyle was discussed.  This would include heart-healthy diet, regular aerobic exercises, maintenance of desirable body weight and avoidance of tobacco products  Patient is encouraged to contact the office with any recurrent symptoms or concerns as reviewed today. All questions were answered with the patient voicing understanding. Remain on ASA and crestor. HTN: better, Ace qhs. Coreg BID as well, HR low. HPL: remain on crestor, goal LDL < 70. Tobacco Use:  needs cessation  JAMES:  cannot wear CPAP. Labs here soon. Patient has been instructed and agrees to call our office with any issues or other concerns related to their cardiac condition(s) and/or complaint(s). Return in about 6 months (around 5/22/2023).        JV MANLEY, DO  11/22/2022

## 2023-02-07 ENCOUNTER — HOSPITAL ENCOUNTER (INPATIENT)
Age: 63
LOS: 1 days | Discharge: HOME | DRG: 247 | End: 2023-02-09
Attending: EMERGENCY MEDICINE | Admitting: INTERNAL MEDICINE
Payer: COMMERCIAL

## 2023-02-07 ENCOUNTER — APPOINTMENT (OUTPATIENT)
Dept: GENERAL RADIOLOGY | Age: 63
DRG: 247 | End: 2023-02-07
Payer: COMMERCIAL

## 2023-02-07 DIAGNOSIS — R94.31 ABNORMAL ECG: ICD-10-CM

## 2023-02-07 DIAGNOSIS — I21.4 NSTEMI (NON-ST ELEVATED MYOCARDIAL INFARCTION) (HCC): ICD-10-CM

## 2023-02-07 DIAGNOSIS — R07.9 CHEST PAIN, UNSPECIFIED TYPE: Primary | ICD-10-CM

## 2023-02-07 LAB
ALBUMIN SERPL-MCNC: 3.4 G/DL (ref 3.2–4.6)
ALBUMIN/GLOB SERPL: 0.9 (ref 0.4–1.6)
ALP SERPL-CCNC: 70 U/L (ref 50–136)
ALT SERPL-CCNC: 17 U/L (ref 12–65)
ANION GAP SERPL CALC-SCNC: 3 MMOL/L (ref 2–11)
AST SERPL-CCNC: 10 U/L (ref 15–37)
BILIRUB SERPL-MCNC: 0.4 MG/DL (ref 0.2–1.1)
BUN SERPL-MCNC: 13 MG/DL (ref 8–23)
CALCIUM SERPL-MCNC: 9.4 MG/DL (ref 8.3–10.4)
CHLORIDE SERPL-SCNC: 106 MMOL/L (ref 101–110)
CO2 SERPL-SCNC: 29 MMOL/L (ref 21–32)
CREAT SERPL-MCNC: 0.8 MG/DL (ref 0.6–1)
ERYTHROCYTE [DISTWIDTH] IN BLOOD BY AUTOMATED COUNT: 13.8 % (ref 11.9–14.6)
GLOBULIN SER CALC-MCNC: 3.6 G/DL (ref 2.8–4.5)
GLUCOSE SERPL-MCNC: 158 MG/DL (ref 65–100)
HCT VFR BLD AUTO: 44.3 % (ref 35.8–46.3)
HGB BLD-MCNC: 14.7 G/DL (ref 11.7–15.4)
LIPASE SERPL-CCNC: 79 U/L (ref 73–393)
MCH RBC QN AUTO: 30.8 PG (ref 26.1–32.9)
MCHC RBC AUTO-ENTMCNC: 33.2 G/DL (ref 31.4–35)
MCV RBC AUTO: 92.9 FL (ref 82–102)
NRBC # BLD: 0 K/UL (ref 0–0.2)
PLATELET # BLD AUTO: 247 K/UL (ref 150–450)
PMV BLD AUTO: 11.4 FL (ref 9.4–12.3)
POTASSIUM SERPL-SCNC: 3.4 MMOL/L (ref 3.5–5.1)
PROT SERPL-MCNC: 7 G/DL (ref 6.3–8.2)
RBC # BLD AUTO: 4.77 M/UL (ref 4.05–5.2)
SODIUM SERPL-SCNC: 138 MMOL/L (ref 133–143)
TROPONIN I SERPL HS-MCNC: 1390.7 PG/ML (ref 0–14)
TROPONIN I SERPL HS-MCNC: 19.4 PG/ML (ref 0–14)
WBC # BLD AUTO: 8.7 K/UL (ref 4.3–11.1)

## 2023-02-07 PROCEDURE — 99285 EMERGENCY DEPT VISIT HI MDM: CPT

## 2023-02-07 PROCEDURE — 94761 N-INVAS EAR/PLS OXIMETRY MLT: CPT

## 2023-02-07 PROCEDURE — 93005 ELECTROCARDIOGRAM TRACING: CPT | Performed by: EMERGENCY MEDICINE

## 2023-02-07 PROCEDURE — 83690 ASSAY OF LIPASE: CPT

## 2023-02-07 PROCEDURE — 84484 ASSAY OF TROPONIN QUANT: CPT

## 2023-02-07 PROCEDURE — 71046 X-RAY EXAM CHEST 2 VIEWS: CPT

## 2023-02-07 PROCEDURE — 6370000000 HC RX 637 (ALT 250 FOR IP): Performed by: EMERGENCY MEDICINE

## 2023-02-07 PROCEDURE — 80053 COMPREHEN METABOLIC PANEL: CPT

## 2023-02-07 PROCEDURE — 85027 COMPLETE CBC AUTOMATED: CPT

## 2023-02-07 RX ORDER — ASPIRIN 325 MG
325 TABLET ORAL
Status: COMPLETED | OUTPATIENT
Start: 2023-02-07 | End: 2023-02-07

## 2023-02-07 RX ADMIN — ASPIRIN 325 MG ORAL TABLET 325 MG: 325 PILL ORAL at 22:18

## 2023-02-07 ASSESSMENT — ENCOUNTER SYMPTOMS
RHINORRHEA: 0
BACK PAIN: 0
WHEEZING: 0
COUGH: 0
ABDOMINAL PAIN: 0
VOMITING: 0
SHORTNESS OF BREATH: 1
NAUSEA: 0
DIARRHEA: 0

## 2023-02-07 NOTE — ED PROVIDER NOTES
Emergency Department Provider Note                   PCP:                Srinath Loera MD               Age: 63 y.o. Sex: female       ICD-10-CM    1. Chest pain, unspecified type  R07.9       2. Abnormal ECG  R94.31       3. NSTEMI (non-ST elevated myocardial infarction) ([facility] [address]  I21.4           DISPOSITION Decision To Admit 02/08/2023 12:06:11 AM        Medical Decision Making  79-year-old female with history of tobacco use, pulmonary nodule, hypothyroidism, anxiety, hypertension, obesity, hyperlipidemia, CAD status post stent x2 in 2021 who presents with complaint of midsternal chest discomfort/pressure that started earlier today w/ radiation to bilateral jaws/L shoulder. Hypertensive on arrival.  Order placed for aspirin. Patient states she took nitro prior to arrival without significant improvement of symptoms. EKG with normal sinus rhythm. Heart rate 67. ST depression noted in leads II, III, aVF. Initial troponin 19.5. Discussed my concern with patient and her EKG findings given her significant history. When looking for patient in waiting room for reassessment I was informed by triage nurse that patient had her IV removed and left with family member. I was never informed of this. I contacted patient via her cell phone and instructed that she needed to come back to ED. Patient ultimately returned to ED. Repeat troponin noted to trend from 19.5 to 1,390.7. Repeat EKG with heart rate of 65. No ST elevation noted. Normal sinus rhythm. Order placed for heparin bolus + infusion, nitro SL. Cardiology consulted. Dr. Sultana Kelly to present to bedside to evaluate patient. Problems Addressed:  Chest pain, unspecified type: acute illness or injury  NSTEMI (non-ST elevated myocardial infarction) [facility]): acute illness or injury    Amount and/or Complexity of Data Reviewed  External Data Reviewed: labs, radiology, ECG and notes.      Details: NSTEMI 1/39/4176: PCI x 2 complicated by Vfib Arrest Echo 1/2021: normal EF. NST 10/2022: Findings suggest a low risk of myocardial ischemia. Labs: ordered. Decision-making details documented in ED Course. Radiology: ordered. ECG/medicine tests: ordered and independent interpretation performed. Risk  OTC drugs. Prescription drug management. Decision regarding hospitalization. Risk Details: Heart Score 6/7. Complexity of Problem: 2 or more stable chronic illnesses. (4)  Complexity of Problem: 1 or more chronic illnesses with severe exacerbation. (5)  The patients assessment required an independent historian: I spoke with a family member. I have conducted an independent ordering and review of Labs. I have conducted an independent ordering and review of EKG. I have conducted an independent ordering and review of X-rays. I have reviewed records from an external source: provider visit notes from outside specialist.  I have reviewed records from an external source: previous old EKG's reviewed. I have reviewed records from an external source: previous lab results from outside ED. I have reviewed records from an external source: previous imaging studies from outside ED. Considerations: Shared decision making was utilized in the care of this patient. Evidence based risk calculation performed: HEART score. I discussed disposition with another provider. Patient was admitted I have communication with admitting physician. ED Course as of 02/08/23 0008 Tue Feb 07, 2023 1928 Troponin, High Sensitivity(!): 19.4 [DF]   2051 2 view chest X-ray    FINDINGS:     Costophrenic angles are clear. Heart size is normal.  Pulmonary vasculature is not distended. There are no dense regions of   consolidation. IMPRESSION:     No acute cardiopulmonary process.  [DF]   7087 Troponin, High Sensitivity(!!): 1,390.7 [DF]      ED Course User Index  [DF] Carter Mittal MD        Orders Placed This Encounter   Procedures    XR CHEST (2 VW)    CBC Comprehensive Metabolic Panel    Troponin    Lipase    CBC    APTT    Protime-INR    Anti-Xa, Unfractionated Heparin    Cardiac Monitor    Pulse Oximetry    POCT Glucose    EKG 12 Lead    Saline lock IV    Saline lock IV        Medications   heparin (porcine) injection 5,720 Units (has no administration in time range)   heparin (porcine) injection 5,720 Units (has no administration in time range)   heparin (porcine) injection 2,860 Units (has no administration in time range)   heparin 25,000 units in dextrose 5% 250 mL (premix) infusion (has no administration in time range)   nitroGLYCERIN (NITROSTAT) SL tablet 0.4 mg (has no administration in time range)   aspirin tablet 325 mg (325 mg Oral Given 2/7/23 2215)       New Prescriptions    No medications on file        Anoop Lobato is a 63 y.o. female who presents to the Emergency Department with chief complaint of chest pressure. 82-year-old female with history of tobacco use, pulmonary nodule, hypothyroidism, anxiety, hypertension, obesity, hyperlipidemia, CAD status post stent x2 who presents with complaint of midsternal chest discomfort/pressure that started earlier today. States the pain radiates up to her bilateral jaws as well as her left shoulder. Reports dyspnea with exertion. Reports mild nausea but denies diaphoresis. States that she took sublingual nitroglycerin without improvement of symptoms. Denies any alleviating or exacerbating factors. States that she took aspirin 81 mg earlier today. Rates symptoms as moderate. The history is provided by the patient. No  was used. Review of Systems   Constitutional:  Negative for chills, fatigue and fever. HENT:  Negative for congestion and rhinorrhea. Respiratory:  Positive for shortness of breath. Negative for cough and wheezing. Cardiovascular:  Positive for chest pain. Gastrointestinal:  Negative for abdominal pain, diarrhea, nausea and vomiting. Genitourinary:  Negative for dysuria and flank pain. Musculoskeletal:  Negative for back pain and myalgias. Skin:  Negative for rash. Neurological:  Negative for dizziness, weakness and headaches. Hematological:  Does not bruise/bleed easily.      Past Medical History:   Diagnosis Date    Abnormal abdominal CT scan     Acne     Allergic rhinitis     Anxiety     Arm pain     Arthritis     Backache, unspecified     Biceps tendonitis     Calculus of kidney     Cervical strain     Chest pain     Chronic pain     arthritis in back and legs    Chronic right shoulder pain     Cigarette smoker     Cramps, muscle, general     Cyst of joint of shoulder     Depressive disorder, not elsewhere classified     Dyshidrosis     Dyspnea     Eczema, dyshidrotic     Encounter for long-term (current) use of other medications     Encounter for monitoring of chronic aspirin therapy     Generalized pain     GERD (gastroesophageal reflux disease)     Helicobacter pylori (H. pylori)     Hematuria     HLD (hyperlipidemia)     Low vitamin B12 level     Microhematuria 2014    Myofascial pain     Neck pain     Night sweats     Obesity     JAMES (obstructive sleep apnea)     Osteopenia     Other B-complex deficiencies     Other specified disorders of shoulder joint     Palpitation     Postmenopausal disorder     Recurrent major depressive disorder, in partial remission (HCC)     Restless legs     Rosacea     Sinusitis     Spasm of colon     Sprain of neck     Tendonitis     Tobacco use disorder     Unspecified hypothyroidism     Unspecified menopausal and postmenopausal disorder         Past Surgical History:   Procedure Laterality Date    APPENDECTOMY       SECTION      x 2    CHOLECYSTECTOMY      COLOSTOMY      COLOSTOMY CLOSURE  2009    HERNIA REPAIR  2010    [facility]- Dr. Gonzalez Prima      drainage of diverticular abscess    OTHER SURGICAL HISTORY      Laser Conization    TUBAL LIGATION Bilateral     with 2nd     US GUIDED CORE BREAST BIOPSY Bilateral 2012    Sterotactic, benign biopsy report--both showed dystropic calcifications, sclerosing adenosis and ductal hyperplasia        Family History   Problem Relation Age of Onset    Stroke Maternal Grandmother     Heart Disease Father     Cancer Brother         Pancreatic    Heart Attack Brother 62        with stent    Alcohol Abuse Brother     Hypertension Maternal Grandmother     Diabetes Maternal Grandmother     Heart Attack Sister 43        with stent    Stroke Sister     Hypertension Sister     Thyroid Disease Sister     Cancer Sister         Pancreatic    Breast Cancer Neg Hx     Hypertension Mother     Heart Disease Mother         hole in heart; top part swollen    Thyroid Disease Mother     Diabetes Father     Heart Attack Father 61    Cancer Maternal Grandmother         lymph nodes        Social History     Socioeconomic History    Marital status:    Tobacco Use    Smoking status: Every Day     Packs/day: 1.00     Types: Cigarettes    Smokeless tobacco: Never   Substance and Sexual Activity    Alcohol use: No    Drug use: No         Citalopram, Gabapentin, Milnacipran, Pramipexole, Varenicline, and Sulfa antibiotics     Previous Medications    ALBUTEROL SULFATE  (90 BASE) MCG/ACT INHALER    Inhale 2 puffs into the lungs every 4 hours as needed    ASPIRIN 81 MG CHEWABLE TABLET    Take 81 mg by mouth daily    FLUTICASONE (FLONASE) 50 MCG/ACT NASAL SPRAY    2 sprays by Nasal route daily    HYDROCODONE-ACETAMINOPHEN (NORCO)  MG PER TABLET    Take 2 tablets by mouth 3 times daily as needed.     IBUPROFEN (ADVIL;MOTRIN) 600 MG TABLET    Take by mouth every 6 hours as needed    LEVOTHYROXINE (SYNTHROID) 75 MCG TABLET    Take 75 mcg by mouth every morning (before breakfast)    LISINOPRIL (PRINIVIL;ZESTRIL) 20 MG TABLET    Take 1 tablet by mouth 2 times daily    NITROGLYCERIN (NITROSTAT) 0.4 MG SL TABLET    Place 0.4 mg under the tongue ROSUVASTATIN (CRESTOR) 10 MG TABLET    Take 10 mg by mouth        Vitals signs and nursing note reviewed. No data found. Physical Exam  Vitals and nursing note reviewed. Constitutional:       Appearance: Normal appearance. HENT:      Head: Normocephalic. Nose: Nose normal.      Mouth/Throat:      Mouth: Mucous membranes are moist.   Eyes:      Extraocular Movements: Extraocular movements intact. Pupils: Pupils are equal, round, and reactive to light. Cardiovascular:      Rate and Rhythm: Normal rate. Pulses: Normal pulses. Heart sounds: Normal heart sounds. Comments: Pulses 2+ throughout. Pulmonary:      Effort: Pulmonary effort is normal.      Breath sounds: Normal breath sounds. Comments: CTAB. Abdominal:      Palpations: Abdomen is soft. Tenderness: There is no abdominal tenderness. There is no guarding or rebound. Comments: Soft, NTND. No CVAT. Musculoskeletal:         General: No deformity. Normal range of motion. Cervical back: Normal range of motion. No rigidity. Comments: No LE edema. No palpable cords. Skin:     General: Skin is warm. Findings: No rash. Neurological:      General: No focal deficit present. Mental Status: She is alert and oriented to person, place, and time. Cranial Nerves: No cranial nerve deficit. Sensory: No sensory deficit. Motor: No weakness.    Psychiatric:         Mood and Affect: Mood normal.         Behavior: Behavior normal.        EKG 12 Lead    Date/Time: 2/7/2023 6:10 PM  Performed by: Fay Napoles MD  Authorized by: Fay Napoles MD     ECG reviewed by ED Physician in the absence of a cardiologist: yes    Rate:     ECG rate:  67    ECG rate assessment: normal    Rhythm:     Rhythm: sinus rhythm    Ectopy:     Ectopy: none    QRS:     QRS axis:  Normal    QRS intervals:  Normal    QRS conduction: normal    ST segments:     ST segments:  Depression Depression:  II, III and aVF  T waves:     T waves: normal    Critical Care  Performed by: Sonia Mahoney MD  Authorized by: Sonia Mahoney MD     Critical care provider statement:     Critical care time (minutes):  45    Critical care time was exclusive of:  Separately billable procedures and treating other patients    Critical care was necessary to treat or prevent imminent or life-threatening deterioration of the following conditions:  Circulatory failure (NSTEMI)    Critical care was time spent personally by me on the following activities:  Development of treatment plan with patient or surrogate, discussions with consultants, evaluation of patient's response to treatment, examination of patient, obtaining history from patient or surrogate, ordering and performing treatments and interventions, ordering and review of laboratory studies, ordering and review of radiographic studies, pulse oximetry, re-evaluation of patient's condition and review of old charts    I assumed direction of critical care for this patient from another provider in my specialty: no      Care discussed with: admitting provider      Results for orders placed or performed during the hospital encounter of 02/07/23   XR CHEST (2 VW)    Narrative    EXAMINATION: 2 view chest    DATE: 2/7/2023 5:30 PM    COMPARISON: None    CLINICAL HISTORY: Chest pain    FINDINGS:    Costophrenic angles are clear. Heart size is normal.  Pulmonary vasculature is not distended. There are no dense regions of   consolidation. Impression    No acute cardiopulmonary process.     Clarence Espana M.D.   2/7/2023 6:50:00 PM   CBC   Result Value Ref Range    WBC 8.7 4.3 - 11.1 K/uL    RBC 4.77 4.05 - 5.2 M/uL    Hemoglobin 14.7 11.7 - 15.4 g/dL    Hematocrit 44.3 35.8 - 46.3 %    MCV 92.9 82 - 102 FL    MCH 30.8 26.1 - 32.9 PG    MCHC 33.2 31.4 - 35.0 g/dL    RDW 13.8 11.9 - 14.6 %    Platelets 275 378 - 715 K/uL    MPV 11.4 9.4 - 12.3 FL    nRBC 0.00 0.0 - 0.2 K/uL   Comprehensive Metabolic Panel   Result Value Ref Range    Sodium 138 133 - 143 mmol/L    Potassium 3.4 (L) 3.5 - 5.1 mmol/L    Chloride 106 101 - 110 mmol/L    CO2 29 21 - 32 mmol/L    Anion Gap 3 2 - 11 mmol/L    Glucose 158 (H) 65 - 100 mg/dL    BUN 13 8 - 23 MG/DL    Creatinine 0.80 0.6 - 1.0 MG/DL    Est, Glom Filt Rate >60 >60 ml/min/1.73m2    Calcium 9.4 8.3 - 10.4 MG/DL    Total Bilirubin 0.4 0.2 - 1.1 MG/DL    ALT 17 12 - 65 U/L    AST 10 (L) 15 - 37 U/L    Alk Phosphatase 70 50 - 136 U/L    Total Protein 7.0 6.3 - 8.2 g/dL    Albumin 3.4 3.2 - 4.6 g/dL    Globulin 3.6 2.8 - 4.5 g/dL    Albumin/Globulin Ratio 0.9 0.4 - 1.6     Troponin   Result Value Ref Range    Troponin, High Sensitivity 19.4 (H) 0 - 14 pg/mL   Troponin   Result Value Ref Range    Troponin, High Sensitivity 1,390.7 (HH) 0 - 14 pg/mL   Lipase   Result Value Ref Range    Lipase 79 73 - 393 U/L   EKG 12 Lead   Result Value Ref Range    Ventricular Rate 67 BPM    Atrial Rate 67 BPM    P-R Interval 150 ms    QRS Duration 72 ms    Q-T Interval 418 ms    QTc Calculation (Bazett) 441 ms    P Axis 27 degrees    R Axis 58 degrees    T Axis 113 degrees    Diagnosis Normal sinus rhythm         XR CHEST (2 VW)   Final Result      No acute cardiopulmonary process. Javier Dumont M.D.    2/7/2023 6:50:00 PM                          Voice dictation software was used during the making of this note. This software is not perfect and grammatical and other typographical errors may be present. This note has not been completely proofread for errors.      Amaury Brandt MD  02/08/23 2125

## 2023-02-07 NOTE — ED TRIAGE NOTES
Pt ambulatory to triage. Pt reports she has midsternal chest pain along with jaw pain/pressure. Pt states the pain has been present for about one hour. Pt also has some abd pain, denies nausea.

## 2023-02-07 NOTE — DISCHARGE INSTRUCTIONS
Heart Attack: Care Instructions  Overview     A heart attack is an event that occurs when part of the heart muscle does not get enough blood and oxygen. This part of the heart starts to die. A heart attack is also called a myocardial infarction, or MI. A heart attack most often happens because blood flow through one or more of the coronary arteries is blocked. This blockage is usually caused by a blood clot that forms when plaque in the artery breaks open. After a heart attack, you may be worried about your future. Over the next several weeks, your heart will start to heal. Though it can be hard to break old habits, you can reduce your risk of having another heart attack. You can do this by making some lifestyle changes and by taking medicines. Follow-up care is a key part of your treatment and safety. Be sure to make and go to all appointments, and call your doctor if you are having problems. It's also a good idea to know your test results and keep a list of the medicines you take. How can you care for yourself at home? Activity    Until your doctor says it is okay, do not do strenuous exercise. And do not lift, pull, or push anything heavy. Ask your doctor what types of activities are safe for you. If your doctor has not set you up with a cardiac rehabilitation (rehab) program, talk to your doctor about whether that is right for you. Cardiac rehab includes supervised exercise. It also includes help with diet and lifestyle changes and emotional support. It may reduce your risk of future heart problems. Increase your activities slowly. Take short rest breaks when you get tired. If your doctor recommends it, get more exercise. Walking is a good choice. Bit by bit, increase the amount you walk every day. Try for at least 30 minutes on most days of the week. You also may want to swim, bike, or do other activities.  Talk with your doctor before you start an exercise program to make sure it is safe for you. Ask your doctor when you can drive, go back to work, and do other daily activities again. You can have sex as soon as you feel ready for it. Often this means when you can easily walk around or climb stairs. Talk with your doctor if you have any concerns. If you are taking nitroglycerin, do not take erection-enhancing medicine such as sildenafil (Viagra), tadalafil (Cialis), or vardenafil (Levitra) . Lifestyle changes    Do not smoke. Smoking increases your risk of another heart attack. If you need help quitting, talk to your doctor about stop-smoking programs and medicines. These can increase your chances of quitting for good. Eat a heart-healthy diet that is low in saturated fat and salt, and is full of fruits, vegetables and whole-grains. You may get more details about how to eat healthy. But these tips can help you get started. Stay at a healthy weight, or lose weight if you need to. Medicines    Be safe with medicines. Take your medicines exactly as prescribed. Call your doctor if you think you are having a problem with your medicine. You will get more details on the specific medicines your doctor prescribes. Do not stop taking your medicine unless your doctor tells you to. Not taking your medicine might raise your risk of having another heart attack. You may need several medicines to help lower your risk of another heart attack. These include:  Blood pressure medicines such as angiotensin-converting enzyme (ACE) inhibitors, ARBs (angiotensin II receptor blockers), and beta-blockers. Cholesterol medicine called statins. Aspirin and other blood thinners. These prevent blood clots that can cause a heart attack. If your doctor has given you nitroglycerin, keep it with you at all times. If you have angina symptoms, such as chest pain or pressure, sit down and rest. Take the first dose of nitroglycerin as directed.  If symptoms get worse or are not getting better within 5 minutes, call 911 right away. Stay on the phone. The emergency  will tell you what to do. Do not take any over-the-counter medicines, vitamins, or herbal products without talking to your doctor first.   Staying healthy    Manage other health conditions such as high blood pressure and diabetes. Avoid infections such as COVID-19, colds, and the flu. Get a pneumococcal vaccine. If you have had one before, ask your doctor whether you need another dose. Get the flu vaccine every year. Stay up to date on your COVID-19 vaccines. Be sure to tell your doctor about any angina symptoms you have had, even if they went away. Pay attention to your angina symptoms. Know what is typical for you and learn how to control it. Know when to call for help. Talk to your family, friends, or a counselor about your feelings. It is normal to feel frightened, angry, hopeless, helpless, and even guilty. Talking openly about bad feelings can help you cope. If you have symptoms of depression, talk to your doctor. When should you call for help? Call 911 anytime you think you may need emergency care. For example, call if:    You have symptoms of a heart attack. These may include:  Chest pain or pressure, or a strange feeling in the chest.  Sweating. Shortness of breath. Nausea or vomiting. Pain, pressure, or a strange feeling in the back, neck, jaw, or upper belly or in one or both shoulders or arms. Lightheadedness or sudden weakness. A fast or irregular heartbeat. After you call 911, the  may tell you to chew 1 adult-strength or 2 to 4 low-dose aspirin. Wait for an ambulance. Do not try to drive yourself. You have angina symptoms (such as chest pain or pressure) that do not go away with rest or are not getting better within 5 minutes after you take a dose of nitroglycerin. You passed out (lost consciousness). You feel like you are having another heart attack.    Call your doctor now or seek immediate medical care if:    You are having angina symptoms, such as chest pain or pressure, more often than usual, or the symptoms are different or worse than usual.     You have new or increased shortness of breath. You are dizzy or lightheaded, or you feel like you may faint. Watch closely for changes in your health, and be sure to contact your doctor if you have any problems. Where can you learn more? Go to http://www.woods.com/ and enter H564 to learn more about \"Heart Attack: Care Instructions. \"  Current as of: September 7, 2022               Content Version: 13.5  © 3246-8509 [employer]. Care instructions adapted under license by [facility] ([facility]). If you have questions about a medical condition or this instruction, always ask your healthcare professional. [address] any warranty or liability for your use of this information. Learning About Coronary Angioplasty  What is a coronary angioplasty? Coronary angioplasty is a procedure that uses a thin tube called a catheter to open a blocked or narrowed coronary artery. Coronary arteries are the blood vessels that bring oxygen to the heart muscle. Angioplasty also may be called percutaneous coronary intervention (PCI). Angioplasty can widen an artery that has been narrowed by fatty buildup (plaque) or blocked by a blood clot. The procedure helps blood flow more normally to the heart muscle. How is the procedure done? Coronary angioplasty is done in a cardiac catheterization laboratory (\"cath lab\"). It is done by a heart specialist called a cardiologist. The whole procedure may take 1½ to 3 hours. You lie on a table under a large X-ray machine. You will get medicine through an IV in one of your veins. It helps you relax and not feel pain. You will be awake during the procedure. But you may not be able to remember much about it.   The doctor will inject some medicine into your wrist or groin to numb the skin. You will feel a small needle poke you. It's like having a blood test. You may feel some pressure when the doctor puts in the catheter. But you will not feel pain. The doctor will look at X-ray pictures on a monitor (like a TV screen) to move the catheter to your heart. The doctor then puts a dye into the catheter. This makes your heart's arteries show up on a screen. The doctor can then see any arteries that are blocked or narrowed. You may feel warm or flushed for a short time when the doctor injects dye into your artery. If you have a blocked or narrow artery, the doctor uses a catheter with a tiny balloon at the tip. The doctor puts it into the blocked or narrow area and inflates it. The balloon presses the fatty buildup against the walls of the artery. This buildup is called plaque. This creates more room for blood to flow. In most cases, the doctor then puts a stent in the artery. A stent is a small, expandable tube. It presses against the walls of the artery. The stent is left in the artery to keep the artery open. This helps blood flow. The catheter is removed from your body. What happens right after the procedure? The catheter will be removed. Pressure may be applied to the area where the catheter was put into your blood vessel. This will help prevent bleeding. A small device may also be used to close the blood vessel. You may have a bandage or a compression device on the catheter site. After the procedure, you will be taken to a room where the catheter site and your heart rate, blood pressure, and temperature will be checked several times. If the catheter was put in your groin, you will need to lie still and keep your leg straight for up to a few hours. If the catheter was put in your wrist, you may need to keep your arm still for at least 2 hours. You may go home the same day.  Or you may stay at least 1 night in the hospital. When you go home, you will get instructions from your doctor to help you recover well and prevent problems. Make sure to drink plenty of fluids (unless your doctor tells you not to) for several hours after the procedure. This will help flush the dye out of your body. Follow-up care is a key part of your treatment and safety. Be sure to make and go to all appointments, and call your doctor if you are having problems. It's also a good idea to know your test results and keep a list of the medicines you take. Where can you learn more? Go to http://www.woods.com/ and enter M058 to learn more about \"Learning About Coronary Angioplasty. \"  Current as of: September 7, 2022               Content Version: 13.5  © 2006-2022 [employer]. Care instructions adapted under license by [facility] ([facility]). If you have questions about a medical condition or this instruction, always ask your healthcare professional. [address] any warranty or liability for your use of this information.

## 2023-02-08 ENCOUNTER — APPOINTMENT (OUTPATIENT)
Dept: NON INVASIVE DIAGNOSTICS | Age: 63
DRG: 247 | End: 2023-02-08
Payer: COMMERCIAL

## 2023-02-08 PROBLEM — I25.10 CORONARY ARTERY DISEASE INVOLVING NATIVE CORONARY ARTERY OF NATIVE HEART WITHOUT ANGINA PECTORIS: Status: ACTIVE | Noted: 2021-01-25

## 2023-02-08 PROBLEM — I10 HTN (HYPERTENSION): Status: ACTIVE | Noted: 2021-01-19

## 2023-02-08 PROBLEM — I21.4 NSTEMI (NON-ST ELEVATED MYOCARDIAL INFARCTION) (HCC): Status: ACTIVE | Noted: 2023-02-08

## 2023-02-08 PROBLEM — Z72.0 TOBACCO USE: Status: ACTIVE | Noted: 2018-05-01

## 2023-02-08 LAB
ANION GAP SERPL CALC-SCNC: 9 MMOL/L (ref 2–11)
APTT PPP: 29.2 SEC (ref 24.5–34.2)
BUN SERPL-MCNC: 12 MG/DL (ref 8–23)
CALCIUM SERPL-MCNC: 9.2 MG/DL (ref 8.3–10.4)
CHLORIDE SERPL-SCNC: 109 MMOL/L (ref 101–110)
CHOLEST SERPL-MCNC: 128 MG/DL
CO2 SERPL-SCNC: 25 MMOL/L (ref 21–32)
CREAT SERPL-MCNC: 0.7 MG/DL (ref 0.6–1)
ECHO AO ASC DIAM: 3.5 CM
ECHO AO ASCENDING AORTA INDEX: 1.77 CM/M2
ECHO AO ROOT DIAM: 3.1 CM
ECHO AO ROOT INDEX: 1.57 CM/M2
ECHO AV AREA PEAK VELOCITY: 2.5 CM2
ECHO AV AREA VTI: 2.6 CM2
ECHO AV AREA/BSA PEAK VELOCITY: 1.3 CM2/M2
ECHO AV AREA/BSA VTI: 1.3 CM2/M2
ECHO AV MEAN GRADIENT: 5 MMHG
ECHO AV MEAN VELOCITY: 1 M/S
ECHO AV PEAK GRADIENT: 9 MMHG
ECHO AV PEAK VELOCITY: 1.5 M/S
ECHO AV VELOCITY RATIO: 0.8
ECHO AV VTI: 37.3 CM
ECHO BSA: 2.08 M2
ECHO BSA: 2.08 M2
ECHO EST RA PRESSURE: 3 MMHG
ECHO IVC PROX: 1.7 CM
ECHO LA AREA 2C: 17.5 CM2
ECHO LA AREA 4C: 18 CM2
ECHO LA DIAMETER INDEX: 1.82 CM/M2
ECHO LA DIAMETER: 3.6 CM
ECHO LA MAJOR AXIS: 6 CM
ECHO LA MINOR AXIS: 5.4 CM
ECHO LA TO AORTIC ROOT RATIO: 1.16
ECHO LA VOL 2C: 45 ML (ref 22–52)
ECHO LA VOL 4C: 43 ML (ref 22–52)
ECHO LA VOL BP: 47 ML (ref 22–52)
ECHO LA VOL/BSA BIPLANE: 24 ML/M2 (ref 16–34)
ECHO LA VOLUME INDEX A2C: 23 ML/M2 (ref 16–34)
ECHO LA VOLUME INDEX A4C: 22 ML/M2 (ref 16–34)
ECHO LV E' LATERAL VELOCITY: 8 CM/S
ECHO LV E' SEPTAL VELOCITY: 6 CM/S
ECHO LV EDV A2C: 97 ML
ECHO LV EDV A4C: 91 ML
ECHO LV EDV INDEX A4C: 46 ML/M2
ECHO LV EDV NDEX A2C: 49 ML/M2
ECHO LV EJECTION FRACTION A2C: 54 %
ECHO LV EJECTION FRACTION A4C: 64 %
ECHO LV EJECTION FRACTION BIPLANE: 60 % (ref 55–100)
ECHO LV ESV A2C: 44 ML
ECHO LV ESV A4C: 33 ML
ECHO LV ESV INDEX A2C: 22 ML/M2
ECHO LV ESV INDEX A4C: 17 ML/M2
ECHO LV FRACTIONAL SHORTENING: 34 % (ref 28–44)
ECHO LV INTERNAL DIMENSION DIASTOLE INDEX: 2.07 CM/M2
ECHO LV INTERNAL DIMENSION DIASTOLIC: 4.1 CM (ref 3.9–5.3)
ECHO LV INTERNAL DIMENSION SYSTOLIC INDEX: 1.36 CM/M2
ECHO LV INTERNAL DIMENSION SYSTOLIC: 2.7 CM
ECHO LV IVSD: 1.5 CM (ref 0.6–0.9)
ECHO LV MASS 2D: 228.6 G (ref 67–162)
ECHO LV MASS INDEX 2D: 115.5 G/M2 (ref 43–95)
ECHO LV POSTERIOR WALL DIASTOLIC: 1.4 CM (ref 0.6–0.9)
ECHO LV RELATIVE WALL THICKNESS RATIO: 0.68
ECHO LVOT AREA: 3.1 CM2
ECHO LVOT AV VTI INDEX: 0.84
ECHO LVOT DIAM: 2 CM
ECHO LVOT MEAN GRADIENT: 3 MMHG
ECHO LVOT PEAK GRADIENT: 6 MMHG
ECHO LVOT PEAK VELOCITY: 1.2 M/S
ECHO LVOT STROKE VOLUME INDEX: 49.8 ML/M2
ECHO LVOT SV: 98.6 ML
ECHO LVOT VTI: 31.4 CM
ECHO MV A VELOCITY: 1.24 M/S
ECHO MV AREA VTI: 2.5 CM2
ECHO MV E DECELERATION TIME (DT): 379 MS
ECHO MV E VELOCITY: 0.8 M/S
ECHO MV E/A RATIO: 0.65
ECHO MV E/E' LATERAL: 10
ECHO MV E/E' RATIO (AVERAGED): 11.67
ECHO MV E/E' SEPTAL: 13.33
ECHO MV LVOT VTI INDEX: 1.28
ECHO MV MAX VELOCITY: 1.2 M/S
ECHO MV MEAN GRADIENT: 2 MMHG
ECHO MV MEAN VELOCITY: 0.6 M/S
ECHO MV PEAK GRADIENT: 6 MMHG
ECHO MV VTI: 40.2 CM
ECHO PV ACCELERATION TIME (AT): 87 MS
ECHO PV MAX VELOCITY: 1 M/S
ECHO PV PEAK GRADIENT: 4 MMHG
ECHO RV BASAL DIMENSION: 4 CM
ECHO RV FREE WALL PEAK S': 13 CM/S
ECHO RV INTERNAL DIMENSION: 2.8 CM
ECHO RV TAPSE: 2 CM (ref 1.7–?)
EKG ATRIAL RATE: 54 BPM
EKG ATRIAL RATE: 67 BPM
EKG DIAGNOSIS: NORMAL
EKG DIAGNOSIS: NORMAL
EKG P AXIS: 27 DEGREES
EKG P AXIS: 7 DEGREES
EKG P-R INTERVAL: 150 MS
EKG P-R INTERVAL: 158 MS
EKG Q-T INTERVAL: 418 MS
EKG Q-T INTERVAL: 448 MS
EKG QRS DURATION: 70 MS
EKG QRS DURATION: 72 MS
EKG QTC CALCULATION (BAZETT): 424 MS
EKG QTC CALCULATION (BAZETT): 441 MS
EKG R AXIS: 41 DEGREES
EKG R AXIS: 58 DEGREES
EKG T AXIS: 113 DEGREES
EKG T AXIS: 114 DEGREES
EKG VENTRICULAR RATE: 54 BPM
EKG VENTRICULAR RATE: 67 BPM
ERYTHROCYTE [DISTWIDTH] IN BLOOD BY AUTOMATED COUNT: 13.8 % (ref 11.9–14.6)
GLUCOSE SERPL-MCNC: 101 MG/DL (ref 65–100)
HCT VFR BLD AUTO: 42.3 % (ref 35.8–46.3)
HDLC SERPL-MCNC: 45 MG/DL (ref 40–60)
HDLC SERPL: 2.8
HGB BLD-MCNC: 13.9 G/DL (ref 11.7–15.4)
INR PPP: 0.9
LDLC SERPL CALC-MCNC: 58.4 MG/DL
LV EF: 60 %
LVEF MODALITY: ABNORMAL
MAGNESIUM SERPL-MCNC: 2.1 MG/DL (ref 1.8–2.4)
MCH RBC QN AUTO: 30.3 PG (ref 26.1–32.9)
MCHC RBC AUTO-ENTMCNC: 32.9 G/DL (ref 31.4–35)
MCV RBC AUTO: 92.2 FL (ref 82–102)
NRBC # BLD: 0 K/UL (ref 0–0.2)
PLATELET # BLD AUTO: 228 K/UL (ref 150–450)
PMV BLD AUTO: 11.6 FL (ref 9.4–12.3)
POTASSIUM SERPL-SCNC: 3.2 MMOL/L (ref 3.5–5.1)
PROTHROMBIN TIME: 13 SEC (ref 12.6–14.3)
RBC # BLD AUTO: 4.59 M/UL (ref 4.05–5.2)
SODIUM SERPL-SCNC: 143 MMOL/L (ref 133–143)
TRIGL SERPL-MCNC: 123 MG/DL (ref 35–150)
TROPONIN I SERPL HS-MCNC: 1382.4 PG/ML (ref 0–14)
UFH PPP CHRO-ACNC: <0.1 IU/ML (ref 0.3–0.7)
VLDLC SERPL CALC-MCNC: 24.6 MG/DL (ref 6–23)
WBC # BLD AUTO: 9.8 K/UL (ref 4.3–11.1)

## 2023-02-08 PROCEDURE — 2709999900 HC NON-CHARGEABLE SUPPLY: Performed by: INTERNAL MEDICINE

## 2023-02-08 PROCEDURE — C1725 CATH, TRANSLUMIN NON-LASER: HCPCS | Performed by: INTERNAL MEDICINE

## 2023-02-08 PROCEDURE — 2580000003 HC RX 258: Performed by: NURSE PRACTITIONER

## 2023-02-08 PROCEDURE — 1100000003 HC PRIVATE W/ TELEMETRY

## 2023-02-08 PROCEDURE — 6370000000 HC RX 637 (ALT 250 FOR IP): Performed by: NURSE PRACTITIONER

## 2023-02-08 PROCEDURE — C1874 STENT, COATED/COV W/DEL SYS: HCPCS | Performed by: INTERNAL MEDICINE

## 2023-02-08 PROCEDURE — 2580000003 HC RX 258: Performed by: INTERNAL MEDICINE

## 2023-02-08 PROCEDURE — 99152 MOD SED SAME PHYS/QHP 5/>YRS: CPT | Performed by: INTERNAL MEDICINE

## 2023-02-08 PROCEDURE — 6360000004 HC RX CONTRAST MEDICATION: Performed by: INTERNAL MEDICINE

## 2023-02-08 PROCEDURE — 85610 PROTHROMBIN TIME: CPT

## 2023-02-08 PROCEDURE — 6360000002 HC RX W HCPCS: Performed by: INTERNAL MEDICINE

## 2023-02-08 PROCEDURE — 36415 COLL VENOUS BLD VENIPUNCTURE: CPT

## 2023-02-08 PROCEDURE — 6360000002 HC RX W HCPCS: Performed by: EMERGENCY MEDICINE

## 2023-02-08 PROCEDURE — C1887 CATHETER, GUIDING: HCPCS | Performed by: INTERNAL MEDICINE

## 2023-02-08 PROCEDURE — 80061 LIPID PANEL: CPT

## 2023-02-08 PROCEDURE — 6370000000 HC RX 637 (ALT 250 FOR IP): Performed by: INTERNAL MEDICINE

## 2023-02-08 PROCEDURE — 84484 ASSAY OF TROPONIN QUANT: CPT

## 2023-02-08 PROCEDURE — 93306 TTE W/DOPPLER COMPLETE: CPT

## 2023-02-08 PROCEDURE — 99153 MOD SED SAME PHYS/QHP EA: CPT | Performed by: INTERNAL MEDICINE

## 2023-02-08 PROCEDURE — B2111ZZ FLUOROSCOPY OF MULTIPLE CORONARY ARTERIES USING LOW OSMOLAR CONTRAST: ICD-10-PCS | Performed by: INTERNAL MEDICINE

## 2023-02-08 PROCEDURE — 85520 HEPARIN ASSAY: CPT

## 2023-02-08 PROCEDURE — C1769 GUIDE WIRE: HCPCS | Performed by: INTERNAL MEDICINE

## 2023-02-08 PROCEDURE — 2500000003 HC RX 250 WO HCPCS: Performed by: INTERNAL MEDICINE

## 2023-02-08 PROCEDURE — 027034Z DILATION OF CORONARY ARTERY, ONE ARTERY WITH DRUG-ELUTING INTRALUMINAL DEVICE, PERCUTANEOUS APPROACH: ICD-10-PCS | Performed by: INTERNAL MEDICINE

## 2023-02-08 PROCEDURE — 93458 L HRT ARTERY/VENTRICLE ANGIO: CPT | Performed by: INTERNAL MEDICINE

## 2023-02-08 PROCEDURE — 93005 ELECTROCARDIOGRAM TRACING: CPT | Performed by: INTERNAL MEDICINE

## 2023-02-08 PROCEDURE — 93306 TTE W/DOPPLER COMPLETE: CPT | Performed by: INTERNAL MEDICINE

## 2023-02-08 PROCEDURE — 85027 COMPLETE CBC AUTOMATED: CPT

## 2023-02-08 PROCEDURE — 83735 ASSAY OF MAGNESIUM: CPT

## 2023-02-08 PROCEDURE — 4A023N7 MEASUREMENT OF CARDIAC SAMPLING AND PRESSURE, LEFT HEART, PERCUTANEOUS APPROACH: ICD-10-PCS | Performed by: INTERNAL MEDICINE

## 2023-02-08 PROCEDURE — 6360000002 HC RX W HCPCS: Performed by: NURSE PRACTITIONER

## 2023-02-08 PROCEDURE — 85730 THROMBOPLASTIN TIME PARTIAL: CPT

## 2023-02-08 PROCEDURE — C1894 INTRO/SHEATH, NON-LASER: HCPCS | Performed by: INTERNAL MEDICINE

## 2023-02-08 PROCEDURE — 80048 BASIC METABOLIC PNL TOTAL CA: CPT

## 2023-02-08 PROCEDURE — C9600 PERC DRUG-EL COR STENT SING: HCPCS | Performed by: INTERNAL MEDICINE

## 2023-02-08 DEVICE — STENT ONYXNG27522UX ONYX 2.75X22RX
Type: IMPLANTABLE DEVICE | Site: HEART | Status: FUNCTIONAL
Brand: ONYX FRONTIER™

## 2023-02-08 RX ORDER — SODIUM CHLORIDE 0.9 % (FLUSH) 0.9 %
5-40 SYRINGE (ML) INJECTION PRN
Status: DISCONTINUED | OUTPATIENT
Start: 2023-02-08 | End: 2023-02-09 | Stop reason: HOSPADM

## 2023-02-08 RX ORDER — SODIUM CHLORIDE 0.9 % (FLUSH) 0.9 %
5-40 SYRINGE (ML) INJECTION EVERY 12 HOURS SCHEDULED
Status: DISCONTINUED | OUTPATIENT
Start: 2023-02-08 | End: 2023-02-09 | Stop reason: HOSPADM

## 2023-02-08 RX ORDER — HEPARIN SODIUM 1000 [USP'U]/ML
2000 INJECTION, SOLUTION INTRAVENOUS; SUBCUTANEOUS PRN
Status: DISCONTINUED | OUTPATIENT
Start: 2023-02-08 | End: 2023-02-09 | Stop reason: HOSPADM

## 2023-02-08 RX ORDER — POTASSIUM CHLORIDE 20 MEQ/1
40 TABLET, EXTENDED RELEASE ORAL PRN
Status: DISCONTINUED | OUTPATIENT
Start: 2023-02-08 | End: 2023-02-09 | Stop reason: HOSPADM

## 2023-02-08 RX ORDER — MIDAZOLAM HYDROCHLORIDE 1 MG/ML
INJECTION INTRAMUSCULAR; INTRAVENOUS PRN
Status: DISCONTINUED | OUTPATIENT
Start: 2023-02-08 | End: 2023-02-08 | Stop reason: HOSPADM

## 2023-02-08 RX ORDER — CLOPIDOGREL BISULFATE 75 MG/1
TABLET ORAL PRN
Status: DISCONTINUED | OUTPATIENT
Start: 2023-02-08 | End: 2023-02-08 | Stop reason: HOSPADM

## 2023-02-08 RX ORDER — BIVALIRUDIN 250 MG/5ML
INJECTION, POWDER, LYOPHILIZED, FOR SOLUTION INTRAVENOUS PRN
Status: DISCONTINUED | OUTPATIENT
Start: 2023-02-08 | End: 2023-02-08 | Stop reason: HOSPADM

## 2023-02-08 RX ORDER — NITROGLYCERIN 0.4 MG/1
0.4 TABLET SUBLINGUAL
Status: DISPENSED | OUTPATIENT
Start: 2023-02-08 | End: 2023-02-08

## 2023-02-08 RX ORDER — MAGNESIUM HYDROXIDE/ALUMINUM HYDROXICE/SIMETHICONE 120; 1200; 1200 MG/30ML; MG/30ML; MG/30ML
SUSPENSION ORAL PRN
Status: DISCONTINUED | OUTPATIENT
Start: 2023-02-08 | End: 2023-02-08 | Stop reason: HOSPADM

## 2023-02-08 RX ORDER — POTASSIUM CHLORIDE 7.45 MG/ML
10 INJECTION INTRAVENOUS PRN
Status: DISCONTINUED | OUTPATIENT
Start: 2023-02-08 | End: 2023-02-09 | Stop reason: HOSPADM

## 2023-02-08 RX ORDER — HEPARIN SODIUM 1000 [USP'U]/ML
4000 INJECTION, SOLUTION INTRAVENOUS; SUBCUTANEOUS ONCE
Status: COMPLETED | OUTPATIENT
Start: 2023-02-08 | End: 2023-02-08

## 2023-02-08 RX ORDER — ASPIRIN 81 MG/1
81 TABLET, CHEWABLE ORAL DAILY
Status: DISCONTINUED | OUTPATIENT
Start: 2023-02-08 | End: 2023-02-09 | Stop reason: HOSPADM

## 2023-02-08 RX ORDER — CLOPIDOGREL BISULFATE 75 MG/1
75 TABLET ORAL DAILY
Status: DISCONTINUED | OUTPATIENT
Start: 2023-02-09 | End: 2023-02-09 | Stop reason: HOSPADM

## 2023-02-08 RX ORDER — FLUTICASONE PROPIONATE 50 MCG
2 SPRAY, SUSPENSION (ML) NASAL DAILY
Status: DISCONTINUED | OUTPATIENT
Start: 2023-02-08 | End: 2023-02-09 | Stop reason: HOSPADM

## 2023-02-08 RX ORDER — ACETAMINOPHEN 325 MG/1
650 TABLET ORAL EVERY 4 HOURS PRN
Status: DISCONTINUED | OUTPATIENT
Start: 2023-02-08 | End: 2023-02-09 | Stop reason: HOSPADM

## 2023-02-08 RX ORDER — ONDANSETRON 2 MG/ML
4 INJECTION INTRAMUSCULAR; INTRAVENOUS EVERY 4 HOURS PRN
Status: DISCONTINUED | OUTPATIENT
Start: 2023-02-08 | End: 2023-02-09 | Stop reason: HOSPADM

## 2023-02-08 RX ORDER — SODIUM CHLORIDE 9 MG/ML
INJECTION, SOLUTION INTRAVENOUS PRN
Status: DISCONTINUED | OUTPATIENT
Start: 2023-02-08 | End: 2023-02-09 | Stop reason: HOSPADM

## 2023-02-08 RX ORDER — HYDRALAZINE HYDROCHLORIDE 20 MG/ML
10 INJECTION INTRAMUSCULAR; INTRAVENOUS EVERY 6 HOURS PRN
Status: DISCONTINUED | OUTPATIENT
Start: 2023-02-08 | End: 2023-02-09 | Stop reason: HOSPADM

## 2023-02-08 RX ORDER — HYDROCODONE BITARTRATE AND ACETAMINOPHEN 10; 325 MG/1; MG/1
2 TABLET ORAL 3 TIMES DAILY PRN
Status: DISCONTINUED | OUTPATIENT
Start: 2023-02-08 | End: 2023-02-09 | Stop reason: HOSPADM

## 2023-02-08 RX ORDER — MAGNESIUM SULFATE IN WATER 40 MG/ML
2000 INJECTION, SOLUTION INTRAVENOUS PRN
Status: DISCONTINUED | OUTPATIENT
Start: 2023-02-08 | End: 2023-02-09 | Stop reason: HOSPADM

## 2023-02-08 RX ORDER — POLYETHYLENE GLYCOL 3350 17 G/17G
17 POWDER, FOR SOLUTION ORAL DAILY PRN
Status: DISCONTINUED | OUTPATIENT
Start: 2023-02-08 | End: 2023-02-09 | Stop reason: HOSPADM

## 2023-02-08 RX ORDER — ACETAMINOPHEN 325 MG/1
650 TABLET ORAL EVERY 6 HOURS PRN
Status: DISCONTINUED | OUTPATIENT
Start: 2023-02-08 | End: 2023-02-08 | Stop reason: SDUPTHER

## 2023-02-08 RX ORDER — NITROGLYCERIN 0.4 MG/1
0.4 TABLET SUBLINGUAL EVERY 5 MIN PRN
Status: DISCONTINUED | OUTPATIENT
Start: 2023-02-08 | End: 2023-02-09 | Stop reason: HOSPADM

## 2023-02-08 RX ORDER — LIDOCAINE HYDROCHLORIDE 10 MG/ML
INJECTION, SOLUTION INFILTRATION; PERINEURAL PRN
Status: DISCONTINUED | OUTPATIENT
Start: 2023-02-08 | End: 2023-02-08 | Stop reason: HOSPADM

## 2023-02-08 RX ORDER — ROSUVASTATIN CALCIUM 20 MG/1
20 TABLET, COATED ORAL NIGHTLY
Status: DISCONTINUED | OUTPATIENT
Start: 2023-02-08 | End: 2023-02-09 | Stop reason: HOSPADM

## 2023-02-08 RX ORDER — HEPARIN SODIUM 1000 [USP'U]/ML
4000 INJECTION, SOLUTION INTRAVENOUS; SUBCUTANEOUS PRN
Status: DISCONTINUED | OUTPATIENT
Start: 2023-02-08 | End: 2023-02-09 | Stop reason: HOSPADM

## 2023-02-08 RX ORDER — HEPARIN SODIUM 200 [USP'U]/100ML
INJECTION, SOLUTION INTRAVENOUS CONTINUOUS PRN
Status: COMPLETED | OUTPATIENT
Start: 2023-02-08 | End: 2023-02-08

## 2023-02-08 RX ORDER — LEVOTHYROXINE SODIUM 0.12 MG/1
125 TABLET ORAL
Status: DISCONTINUED | OUTPATIENT
Start: 2023-02-08 | End: 2023-02-09 | Stop reason: HOSPADM

## 2023-02-08 RX ORDER — HEPARIN SODIUM 10000 [USP'U]/100ML
5-30 INJECTION, SOLUTION INTRAVENOUS CONTINUOUS
Status: DISCONTINUED | OUTPATIENT
Start: 2023-02-08 | End: 2023-02-09 | Stop reason: HOSPADM

## 2023-02-08 RX ORDER — ACETAMINOPHEN 650 MG/1
650 SUPPOSITORY RECTAL EVERY 6 HOURS PRN
Status: DISCONTINUED | OUTPATIENT
Start: 2023-02-08 | End: 2023-02-08 | Stop reason: SDUPTHER

## 2023-02-08 RX ORDER — MORPHINE SULFATE 2 MG/ML
2 INJECTION, SOLUTION INTRAMUSCULAR; INTRAVENOUS EVERY 4 HOURS PRN
Status: DISCONTINUED | OUTPATIENT
Start: 2023-02-08 | End: 2023-02-09 | Stop reason: HOSPADM

## 2023-02-08 RX ORDER — SODIUM CHLORIDE 9 MG/ML
INJECTION, SOLUTION INTRAVENOUS CONTINUOUS
Status: DISCONTINUED | OUTPATIENT
Start: 2023-02-08 | End: 2023-02-09 | Stop reason: HOSPADM

## 2023-02-08 RX ADMIN — NITROGLYCERIN 1 INCH: 20 OINTMENT TOPICAL at 07:55

## 2023-02-08 RX ADMIN — HYDROCODONE BITARTRATE AND ACETAMINOPHEN 2 TABLET: 10; 325 TABLET ORAL at 22:30

## 2023-02-08 RX ADMIN — ROSUVASTATIN CALCIUM 20 MG: 20 TABLET, COATED ORAL at 20:55

## 2023-02-08 RX ADMIN — SODIUM CHLORIDE, PRESERVATIVE FREE 10 ML: 5 INJECTION INTRAVENOUS at 21:00

## 2023-02-08 RX ADMIN — ACETAMINOPHEN 650 MG: 325 TABLET ORAL at 16:20

## 2023-02-08 RX ADMIN — HYDROCODONE BITARTRATE AND ACETAMINOPHEN 2 TABLET: 10; 325 TABLET ORAL at 11:39

## 2023-02-08 RX ADMIN — METOPROLOL TARTRATE 25 MG: 25 TABLET, FILM COATED ORAL at 07:55

## 2023-02-08 RX ADMIN — HEPARIN SODIUM 4000 UNITS: 1000 INJECTION INTRAVENOUS; SUBCUTANEOUS at 07:50

## 2023-02-08 RX ADMIN — SODIUM CHLORIDE, PRESERVATIVE FREE 10 ML: 5 INJECTION INTRAVENOUS at 20:55

## 2023-02-08 RX ADMIN — FLUTICASONE PROPIONATE 2 SPRAY: 50 SPRAY, METERED NASAL at 07:55

## 2023-02-08 RX ADMIN — LEVOTHYROXINE SODIUM 125 MCG: 0.12 TABLET ORAL at 05:34

## 2023-02-08 RX ADMIN — ASPIRIN 81 MG 81 MG: 81 TABLET ORAL at 07:55

## 2023-02-08 RX ADMIN — HEPARIN SODIUM 4000 UNITS: 1000 INJECTION, SOLUTION INTRAVENOUS; SUBCUTANEOUS at 01:07

## 2023-02-08 RX ADMIN — POTASSIUM CHLORIDE 40 MEQ: 1500 TABLET, EXTENDED RELEASE ORAL at 16:44

## 2023-02-08 RX ADMIN — HYDROCODONE BITARTRATE AND ACETAMINOPHEN 2 TABLET: 10; 325 TABLET ORAL at 01:27

## 2023-02-08 RX ADMIN — SODIUM CHLORIDE: 9 INJECTION, SOLUTION INTRAVENOUS at 01:28

## 2023-02-08 RX ADMIN — METOPROLOL TARTRATE 25 MG: 25 TABLET, FILM COATED ORAL at 20:55

## 2023-02-08 RX ADMIN — NITROGLYCERIN 1 INCH: 20 OINTMENT TOPICAL at 01:28

## 2023-02-08 RX ADMIN — HYDRALAZINE HYDROCHLORIDE 10 MG: 20 INJECTION INTRAMUSCULAR; INTRAVENOUS at 03:04

## 2023-02-08 RX ADMIN — HEPARIN SODIUM 10 UNITS/KG/HR: 10000 INJECTION, SOLUTION INTRAVENOUS at 01:08

## 2023-02-08 RX ADMIN — ROSUVASTATIN CALCIUM 20 MG: 20 TABLET, COATED ORAL at 01:27

## 2023-02-08 RX ADMIN — METOPROLOL TARTRATE 25 MG: 25 TABLET, FILM COATED ORAL at 01:28

## 2023-02-08 ASSESSMENT — LIFESTYLE VARIABLES
HOW OFTEN DO YOU HAVE A DRINK CONTAINING ALCOHOL: NEVER
HOW MANY STANDARD DRINKS CONTAINING ALCOHOL DO YOU HAVE ON A TYPICAL DAY: PATIENT DOES NOT DRINK

## 2023-02-08 ASSESSMENT — PAIN SCALES - GENERAL
PAINLEVEL_OUTOF10: 0
PAINLEVEL_OUTOF10: 4
PAINLEVEL_OUTOF10: 4
PAINLEVEL_OUTOF10: 0
PAINLEVEL_OUTOF10: 7
PAINLEVEL_OUTOF10: 0
PAINLEVEL_OUTOF10: 0
PAINLEVEL_OUTOF10: 3
PAINLEVEL_OUTOF10: 0

## 2023-02-08 ASSESSMENT — PAIN DESCRIPTION - PAIN TYPE: TYPE: ACUTE PAIN

## 2023-02-08 ASSESSMENT — PAIN DESCRIPTION - LOCATION
LOCATION: BACK
LOCATION: HEAD;BACK
LOCATION: HEAD
LOCATION: HEAD

## 2023-02-08 ASSESSMENT — ENCOUNTER SYMPTOMS
RESPIRATORY NEGATIVE: 1
EYES NEGATIVE: 1
GASTROINTESTINAL NEGATIVE: 1

## 2023-02-08 ASSESSMENT — PAIN - FUNCTIONAL ASSESSMENT: PAIN_FUNCTIONAL_ASSESSMENT: PREVENTS OR INTERFERES SOME ACTIVE ACTIVITIES AND ADLS

## 2023-02-08 ASSESSMENT — PAIN DESCRIPTION - ORIENTATION
ORIENTATION: RIGHT;LEFT;MID
ORIENTATION: POSTERIOR
ORIENTATION: ANTERIOR

## 2023-02-08 ASSESSMENT — PAIN DESCRIPTION - DESCRIPTORS
DESCRIPTORS: ACHING
DESCRIPTORS: ACHING;DISCOMFORT
DESCRIPTORS: ACHING;SORE

## 2023-02-08 ASSESSMENT — PAIN SCALES - WONG BAKER: WONGBAKER_NUMERICALRESPONSE: 0

## 2023-02-08 NOTE — PROGRESS NOTES
Report received from [address] [place] [address]. Procedural findings communicated. Intra procedural  medication administration reviewed. Progression of care discussed.      Patient received into [address] [facility] 5 post sheath removal.     Access site without bleeding or swelling yes    Dressing dry and intact yes    Patient instructed to limit movement to right upper extremity    Routine post procedural vital signs and site assessment initiated yes

## 2023-02-08 NOTE — CARE COORDINATION
Pt presented to the ED c/o chest pain. Has a PMHx of CAD, V.Fib, HTN, HLD, hypothyroidism and tobacco abuse (smokes 1 ppd). Pt lives in a single-story story. At baseline, indep with her ADLs. Denies DME use. On RA. Denies current home care services. PCP confirmed. Insurance verified and able to afford home meds. Pt had LHC by Dr. Tuan Miramontes today. No discharge needs identified currently but will remain available. 02/08/23 1152   Service Assessment   Patient Orientation Alert and Oriented   Cognition Alert   History Provided By Patient   Primary Caregiver Self   Support Systems Children;Family Members;[employer];Friends/Neighbors   PCP Verified by CM Yes  [place])   Prior Functional Level Independent in ADLs/IADLs   Current Functional Level Independent in ADLs/IADLs   Can patient return to prior living arrangement Yes   Ability to make needs known: Good   Family able to assist with home care needs: Yes   Would you like for me to discuss the discharge plan with any other family members/significant others, and if so, who? Yes   Financial Resources Medicare;Medicaid   Community Resources None   Social/Functional History   Lives With Family   Type of [address] One level   Bathroom Shower/Tub None   Bathroom Equipment None   Home Equipment None   ADL Assistance Independent   Ambulation Assistance Independent   Active  Yes   Mode of Transportation Car   Occupation Unemployed   Discharge Planning   Type of Residence House   Living Arrangements Family Members   Current Services Prior To Admission None   Potential Assistance Needed N/A   DME Ordered? No   Potential Assistance Purchasing Medications No   Type of Home Care Services None   Patient expects to be discharged to: [facility] [facility] Discharge   Transition of Care Consult (CM Consult) Discharge [address] Discharge None   [facility] Information Provided?  No   Mode of Transport at Discharge Other (see comment)  (Family)   Confirm Follow Up Transport Family

## 2023-02-08 NOTE — PROGRESS NOTES
TRANSFER - IN REPORT:    Verbal report received from LAVELL Cazares on Jitendra Summers being received from ED for routine progression of care. Report consisted of patients Situation, Background, Assessment and Recommendations(SBAR). Information from the following report(s) SBAR was reviewed. Opportunity for questions and clarification was provided. Assessment completed upon patients arrival to unit and care assumed. Patient received to room 308. Patient connected to monitor and assessment completed. Plan of care reviewed. Patient oriented to room and call light. Patient aware to use call light to communicate any chest pain or needs. Admission skin assessment completed with second RN and reveals the following: Sacrum and heels free from redness and breakdown. Secondary RN, Jazz Suarez.

## 2023-02-08 NOTE — PROGRESS NOTES
TRANSFER - OUT REPORT:    Verbal report given to telemetry RN on [facility]  being transferred to [facility] for routine progression of patient care       Report consisted of patient's Situation, Background, Assessment and   Recommendations(SBAR). Information from the following report(s) Nurse Handoff Report was reviewed with the receiving nurse. [place] Assessment: Presents to emergency department  because of falls (Syncope, seizure, or loss of consciousness): No, Age > 79: No, Altered Mental Status, Intoxication with alcohol or substance confusion (Disorientation, impaired judgment, poor safety awaremess, or inability to follow instructions): No, Impaired Mobility: Ambulates or transfers with assistive devices or assistance; Unable to ambulate or transer.: Yes  Lines:   Peripheral IV 02/07/23 Right Antecubital (Active)   Site Assessment Clean, dry & intact 02/08/23 0754   Line Status Infusing 02/08/23 0754   Line Care Connections checked and tightened 02/08/23 0754   Phlebitis Assessment No symptoms 02/08/23 0754   Infiltration Assessment 0 02/08/23 0754   Alcohol Cap Used Yes 02/08/23 0754   Dressing Status Clean, dry & intact 02/08/23 0754   Dressing Type Transparent 02/08/23 0754       Peripheral IV 02/08/23 Left;Proximal;Anterior Forearm (Active)   Site Assessment Clean, dry & intact 02/08/23 0754   Line Status Infusing 02/08/23 0754   Line Care Connections checked and tightened 02/08/23 0754   Phlebitis Assessment No symptoms 02/08/23 0754   Infiltration Assessment 0 02/08/23 0754   Alcohol Cap Used Yes 02/08/23 0754   Dressing Status Clean, dry & intact 02/08/23 0754   Dressing Type Transparent 02/08/23 0754        Opportunity for questions and clarification was provided.       Patient transported with:  Registered Nurse

## 2023-02-08 NOTE — PROGRESS NOTES
TRANSFER - OUT REPORT:    Verbal report given to RN on Jennie Michael  being transferred to [facility] for routine progression of patient care       Report consisted of patient's Situation, Background, Assessment and   Recommendations(SBAR). Information from the following report(s) Nurse Handoff Report was reviewed with the receiving nurse.       [facility] w/ Dr. Geoffrey Mckinley  1 stent to LAD  R radial  TR band 12 mls  2 mg versed  25mcg fentanyl  600 mg plavix  Mylanta  Angiomax bolus and gtt to run until bag empty

## 2023-02-08 NOTE — PROGRESS NOTES
TRANSFER - IN REPORT:    Verbal report received from Donato Ball on [employer] being received from [facility] () for routine progression of care. Report consisted of patients Situation, Background, Assessment and Recommendations(SBAR). Information from the following report(s) Procedure Summary and Cardiac Rhythm sinus carlito  was reviewed. Opportunity for questions and clarification was provided. Assessment completed upon patients arrival to unit and care assumed. Patient received to room 308. Patient connected to monitor and assessment completed. Plan of care reviewed. Pt presents back to unit with right radial cath site Dry and intact with old drainage. No hematoma present.

## 2023-02-08 NOTE — H&P
[facility] Cardiology History & Physical      Date of  Admission: 2/7/2023  5:48 PM     Primary Care Physician: Dr. Sudheer Yoder  Primary Cardiologist: Dr. Mulugeta Chavira  Admitting Physician: Dr. Arina Arroyo      CC: chest pain    HPI:  Shantal Valdez is a 63 y.o. female with past medical history of CAD with PCI x 2 in 2021, VFIB during [address] in 2021, HTN, HLD, hypothyroidism and tobacco abuse who presented to the ER with complaints of chest pain. She describes her pain as tightness that went across her chest with radiation into her jaws. States that her pain went away while sitting in the ER waiting so she left due to the long wait. She states that she was called and told to come back to the ER. Upon arrival to the ER, EKG showed SR with ST depression in inferior leads. Initial hs-troponin was 19.4 with repeat of 1390.7. She was given 325mg ASA and IV heparin in the ER. At the time of interview, patient was chest pain free but signifiacantly hypertensive with . She reports recent daily headaches at home as well. Only complaint at this time is a headache.      Social history -- admits to smoking 1 ppd, has decreased from 2 ppd     Past Medical History:   Diagnosis Date    Abnormal abdominal CT scan     Acne     Allergic rhinitis     Anxiety     Arm pain     Arthritis     Backache, unspecified     Biceps tendonitis     Calculus of kidney     Cervical strain     Chest pain     Chronic pain     arthritis in back and legs    Chronic right shoulder pain     Cigarette smoker     Cramps, muscle, general     Cyst of joint of shoulder     Depressive disorder, not elsewhere classified     Dyshidrosis     Dyspnea     Eczema, dyshidrotic     Encounter for long-term (current) use of other medications     Encounter for monitoring of chronic aspirin therapy     Generalized pain     GERD (gastroesophageal reflux disease)     Helicobacter pylori (H. pylori)     Hematuria     HLD (hyperlipidemia)     Low vitamin B12 level Microhematuria 2014    Myofascial pain     Neck pain     Night sweats     Obesity     JAMES (obstructive sleep apnea)     Osteopenia     Other B-complex deficiencies     Other specified disorders of shoulder joint     Palpitation     Postmenopausal disorder     Recurrent major depressive disorder, in partial remission (HCC)     Restless legs     Rosacea     Sinusitis     Spasm of colon     Sprain of neck     Tendonitis     Tobacco use disorder     Unspecified hypothyroidism     Unspecified menopausal and postmenopausal disorder       Past Surgical History:   Procedure Laterality Date    APPENDECTOMY       SECTION      x 2    CHOLECYSTECTOMY      COLOSTOMY      COLOSTOMY CLOSURE  2009    HERNIA REPAIR  2010    [facility]- Dr. Nia Cage      drainage of diverticular abscess    OTHER SURGICAL HISTORY      Laser Conization    TUBAL LIGATION Bilateral     with 2nd     US GUIDED CORE BREAST BIOPSY Bilateral 2012    Sterotactic, benign biopsy report--both showed dystropic calcifications, sclerosing adenosis and ductal hyperplasia       Allergies   Allergen Reactions    Citalopram Other (See Comments)     Headache    Gabapentin Other (See Comments)     Taking two would cause nightmares    Milnacipran Other (See Comments)     Headache    Pramipexole Nausea Only and Other (See Comments)     Abdominal pain    Varenicline Other (See Comments)     \"Couldn't take\"    Sulfa Antibiotics Nausea And Vomiting      Social History     Socioeconomic History    Marital status:      Spouse name: Not on file    Number of children: Not on file    Years of education: Not on file    Highest education level: Not on file   Occupational History    Not on file   Tobacco Use    Smoking status: Every Day     Packs/day: 1.00     Types: Cigarettes    Smokeless tobacco: Never   Substance and Sexual Activity    Alcohol use: No    Drug use: No    Sexual activity: Not on file   Other Topics Concern    Not on file Social History Narrative    Not on file     Social Determinants of Health     Financial Resource Strain: Not on file   Food Insecurity: Not on file   Transportation Needs: Not on file   Physical Activity: Not on file   Stress: Not on file   Social Connections: Not on file   Intimate Partner Violence: Not on file   Housing Stability: Not on file     Family History   Problem Relation Age of Onset    Stroke Maternal Grandmother     Heart Disease Father     Cancer Brother         Pancreatic    Heart Attack Brother 62        with stent    Alcohol Abuse Brother     Hypertension Maternal Grandmother     Diabetes Maternal Grandmother     Heart Attack Sister 43        with stent    Stroke Sister     Hypertension Sister     Thyroid Disease Sister     Cancer Sister         Pancreatic    Breast Cancer Neg Hx     Hypertension Mother     Heart Disease Mother         hole in heart; top part swollen    Thyroid Disease Mother     Diabetes Father     Heart Attack Father 61    Cancer Maternal Grandmother         lymph nodes        Current Facility-Administered Medications   Medication Dose Route Frequency    heparin (porcine) injection 4,000 Units  4,000 Units IntraVENous PRN    heparin (porcine) injection 2,000 Units  2,000 Units IntraVENous PRN    heparin 25,000 units in dextrose 5% 250 mL (premix) infusion  5-30 Units/kg/hr IntraVENous Continuous    nitroGLYCERIN (NITROSTAT) SL tablet 0.4 mg  0.4 mg SubLINGual NOW    aspirin chewable tablet 81 mg  81 mg Oral Daily    HYDROcodone-acetaminophen (NORCO)  MG per tablet 2 tablet  2 tablet Oral TID PRN    levothyroxine (SYNTHROID) tablet 125 mcg  125 mcg Oral QAM AC    fluticasone (FLONASE) 50 MCG/ACT nasal spray 2 spray  2 spray Nasal Daily    rosuvastatin (CRESTOR) tablet 20 mg  20 mg Oral Nightly    0.9 % sodium chloride infusion   IntraVENous Continuous    sodium chloride flush 0.9 % injection 5-40 mL  5-40 mL IntraVENous 2 times per day    sodium chloride flush 0.9 % injection 5-40 mL  5-40 mL IntraVENous PRN    ondansetron (ZOFRAN) injection 4 mg  4 mg IntraVENous Q4H PRN    acetaminophen (TYLENOL) tablet 650 mg  650 mg Oral Q6H PRN    Or    acetaminophen (TYLENOL) suppository 650 mg  650 mg Rectal Q6H PRN    polyethylene glycol (GLYCOLAX) packet 17 g  17 g Oral Daily PRN    nitroGLYCERIN (NITROSTAT) SL tablet 0.4 mg  0.4 mg SubLINGual Q5 Min PRN    potassium chloride (KLOR-CON M) extended release tablet 40 mEq  40 mEq Oral PRN    Or    potassium bicarb-citric acid (EFFER-K) effervescent tablet 40 mEq  40 mEq Oral PRN    Or    potassium chloride 10 mEq/100 mL IVPB (Peripheral Line)  10 mEq IntraVENous PRN    magnesium sulfate 2000 mg in 50 mL IVPB premix  2,000 mg IntraVENous PRN    metoprolol tartrate (LOPRESSOR) tablet 25 mg  25 mg Oral BID    nitroglycerin (NITRO-BID) 2 % ointment 1 inch  1 inch Topical 4 times per day    morphine injection 2 mg  2 mg IntraVENous Q4H PRN    hydrALAZINE (APRESOLINE) injection 10 mg  10 mg IntraVENous Q6H PRN     Current Outpatient Medications   Medication Sig    lisinopril (PRINIVIL;ZESTRIL) 20 MG tablet Take 1 tablet by mouth 2 times daily    albuterol sulfate  (90 Base) MCG/ACT inhaler Inhale 2 puffs into the lungs every 4 hours as needed    aspirin 81 MG chewable tablet Take 81 mg by mouth daily    fluticasone (FLONASE) 50 MCG/ACT nasal spray 2 sprays by Nasal route daily    HYDROcodone-acetaminophen (NORCO)  MG per tablet Take 2 tablets by mouth 3 times daily as needed. ibuprofen (ADVIL;MOTRIN) 600 MG tablet Take by mouth every 6 hours as needed    levothyroxine (SYNTHROID) 75 MCG tablet Take 75 mcg by mouth every morning (before breakfast)    nitroGLYCERIN (NITROSTAT) 0.4 MG SL tablet Place 0.4 mg under the tongue    rosuvastatin (CRESTOR) 10 MG tablet Take 10 mg by mouth       Review of Systems    Review of Systems   Constitutional: Negative. HENT: Negative. Eyes: Negative.     Cardiovascular:  Positive for chest pain.   Respiratory: Negative. Endocrine: Negative. Hematologic/Lymphatic: Negative. Skin: Negative. Musculoskeletal: Negative. Gastrointestinal: Negative. Genitourinary: Negative. Neurological:  Positive for headaches. Psychiatric/Behavioral: Negative. Subjective:   BP (!) 148/73   Pulse 63   Temp 97.7 °F (36.5 °C) (Oral)   Resp 19   Wt 210 lb (95.3 kg)   SpO2 92%   BMI 38.41 kg/m²   Physical Exam     Cardiographics  Telemetry: normal sinus rhythm  ECG: normal sinus rhythm, ST depression in inferior leads  Echocardiogram:  from 1/19/2021  -  Left ventricle: Systolic function was normal. Ejection fraction was estimated in the range of 55 % to 60 %. There were no regional wall motion abnormalities. -  Inferior vena cava, hepatic veins: The respirophasic change in diameter was more than 50%.     Labs:   Recent Results (from the past 24 hour(s))   EKG 12 Lead    Collection Time: 02/07/23  5:30 PM   Result Value Ref Range    Ventricular Rate 67 BPM    Atrial Rate 67 BPM    P-R Interval 150 ms    QRS Duration 72 ms    Q-T Interval 418 ms    QTc Calculation (Bazett) 441 ms    P Axis 27 degrees    R Axis 58 degrees    T Axis 113 degrees    Diagnosis Normal sinus rhythm    CBC    Collection Time: 02/07/23  5:32 PM   Result Value Ref Range    WBC 8.7 4.3 - 11.1 K/uL    RBC 4.77 4.05 - 5.2 M/uL    Hemoglobin 14.7 11.7 - 15.4 g/dL    Hematocrit 44.3 35.8 - 46.3 %    MCV 92.9 82 - 102 FL    MCH 30.8 26.1 - 32.9 PG    MCHC 33.2 31.4 - 35.0 g/dL    RDW 13.8 11.9 - 14.6 %    Platelets 507 073 - 706 K/uL    MPV 11.4 9.4 - 12.3 FL    nRBC 0.00 0.0 - 0.2 K/uL   Comprehensive Metabolic Panel    Collection Time: 02/07/23  5:32 PM   Result Value Ref Range    Sodium 138 133 - 143 mmol/L    Potassium 3.4 (L) 3.5 - 5.1 mmol/L    Chloride 106 101 - 110 mmol/L    CO2 29 21 - 32 mmol/L    Anion Gap 3 2 - 11 mmol/L    Glucose 158 (H) 65 - 100 mg/dL    BUN 13 8 - 23 MG/DL    Creatinine 0.80 0.6 - 1.0 MG/DL    Est, Glom Filt Rate >60 >60 ml/min/1.73m2    Calcium 9.4 8.3 - 10.4 MG/DL    Total Bilirubin 0.4 0.2 - 1.1 MG/DL    ALT 17 12 - 65 U/L    AST 10 (L) 15 - 37 U/L    Alk Phosphatase 70 50 - 136 U/L    Total Protein 7.0 6.3 - 8.2 g/dL    Albumin 3.4 3.2 - 4.6 g/dL    Globulin 3.6 2.8 - 4.5 g/dL    Albumin/Globulin Ratio 0.9 0.4 - 1.6     Troponin    Collection Time: 23  5:32 PM   Result Value Ref Range    Troponin, High Sensitivity 19.4 (H) 0 - 14 pg/mL   Lipase    Collection Time: 23  5:32 PM   Result Value Ref Range    Lipase 79 73 - 393 U/L   Troponin    Collection Time: 23 10:16 PM   Result Value Ref Range    Troponin, High Sensitivity 1,390.7 (HH) 0 - 14 pg/mL       Patient has been seen and examined by Dr. Duke Baca and he agrees with the following assessment and plan:     Assessment/Plan:          NSTEMI (non-ST elevated myocardial infarction) ([facility] [address] -- admit to telemetry. Follow serial cardiac enzymes. Continue ASA, BB and increase statin to high-intensity dose. NPO now with IV hydration. Plan for [facility] with possible PCI in the AM. Check echocardiogram and lipid panel      Tobacco use -- importance of cessation discussed and encouraged      Coronary artery disease involving native coronary artery of native heart without angina pectoris -- [facility] 21 with PCI to OM1 and PCI to pLAD. Patient had VFIB arrest while on the table. See above. HTN (hypertension) -- uncontrolled in the ER. Continue BB therapy. IV hydralazine PRN for SBP>160. Monitor BP closely. Titrate medications as needed      HLD (hyperlipidemia) -- on low dose Crestor on admission. Increased dose to high-intensity dose.  Check lipid panel in the AM         KIM Torres CNP  2023 1:19 AM             [facility] [place] CARDIOLOGY  [address], [address], [address] [place] [address]  PHONE: [phone]         CONSULT        23      NAME:  Kari Duenas  : 1960  MRN: [id number]      SUBJECTIVE: Jessenia Felipe is a 63 y.o. female seen for a consultation visit regarding the following:     No chief complaint on file. HPI:    82-year-old female with cute onset of chest pain at rest yesterday. It was retrosternal associated with shortness of breath and diaphoresis and relieved with a nitroglycerin. Pain lasted approximately 15 to 20 minutes. Is been no recurrence of pain. There were no aggravating factors. The pain was retrosternal and nonradiating.   Troponins are greater than the thousand    Hospital Problems             Last Modified POA    * (Principal) NSTEMI (non-ST elevated myocardial infarction) ([facility] [address] 2/8/2023 Yes    Tobacco use 2/8/2023 Yes    Coronary artery disease involving native coronary artery of native heart without angina pectoris 2/8/2023 Yes    HTN (hypertension) 2/8/2023 Yes    HLD (hyperlipidemia) 2/8/2023 Yes     Allergies   Allergen Reactions    Citalopram Other (See Comments)     Headache    Gabapentin Other (See Comments)     Taking two would cause nightmares    Milnacipran Other (See Comments)     Headache    Pramipexole Nausea Only and Other (See Comments)     Abdominal pain    Varenicline Other (See Comments)     \"Couldn't take\"    Sulfa Antibiotics Nausea And Vomiting     Past Medical History:   Diagnosis Date    Abnormal abdominal CT scan     Acne     Allergic rhinitis     Anxiety     Arm pain     Arthritis     Backache, unspecified     Biceps tendonitis     Calculus of kidney     Cervical strain     Chest pain     Chronic pain     arthritis in back and legs    Chronic right shoulder pain     Cigarette smoker     Cramps, muscle, general     Cyst of joint of shoulder     Depressive disorder, not elsewhere classified     Dyshidrosis     Dyspnea     Eczema, dyshidrotic     Encounter for long-term (current) use of other medications     Encounter for monitoring of chronic aspirin therapy     Generalized pain     GERD (gastroesophageal reflux disease)     Helicobacter pylori (H. pylori)     Hematuria     HLD (hyperlipidemia)     Low vitamin B12 level     Microhematuria 2014    Myofascial pain     Neck pain     Night sweats     Obesity     JAMES (obstructive sleep apnea)     Osteopenia     Other B-complex deficiencies     Other specified disorders of shoulder joint     Palpitation     Postmenopausal disorder     Recurrent major depressive disorder, in partial remission (HCC)     Restless legs     Rosacea     Sinusitis     Spasm of colon     Sprain of neck     Tendonitis     Tobacco use disorder     Unspecified hypothyroidism     Unspecified menopausal and postmenopausal disorder      Past Surgical History:   Procedure Laterality Date    APPENDECTOMY       SECTION      x 2    CHOLECYSTECTOMY      COLOSTOMY      COLOSTOMY CLOSURE  2009    HERNIA REPAIR  2010    [facility]- Dr. Clarisse Neely      drainage of diverticular abscess    OTHER SURGICAL HISTORY      Laser Conization    TUBAL LIGATION Bilateral     with 2nd     US GUIDED CORE BREAST BIOPSY Bilateral 2012    Sterotactic, benign biopsy report--both showed dystropic calcifications, sclerosing adenosis and ductal hyperplasia     Family History   Problem Relation Age of Onset    Stroke Maternal Grandmother     Heart Disease Father     Cancer Brother         Pancreatic    Heart Attack Brother 62        with stent    Alcohol Abuse Brother     Hypertension Maternal Grandmother     Diabetes Maternal Grandmother     Heart Attack Sister 43        with stent    Stroke Sister     Hypertension Sister     Thyroid Disease Sister     Cancer Sister         Pancreatic    Breast Cancer Neg Hx     Hypertension Mother     Heart Disease Mother         hole in heart; top part swollen    Thyroid Disease Mother     Diabetes Father     Heart Attack Father 61    Cancer Maternal Grandmother         lymph nodes     Social History     Tobacco Use    Smoking status: Every Day     Packs/day: 1.00     Types: Cigarettes Smokeless tobacco: Never   Substance Use Topics    Alcohol use: No           ROS:    Constitution: Negative for fever. Eyes: Negative for blurred vision. Respiratory: Negative for cough. Endocrine: Negative for cold intolerance and heat intolerance. Skin: Negative for rash. Musculoskeletal: Negative for myalgias. Gastrointestinal: Negative for diarrhea, nausea and vomiting. Genitourinary: Negative for dysuria. Neurological: Negative for headaches and numbness. PHYSICAL EXAM:     /63   Pulse 52   Temp 97.7 °F (36.5 °C) (Oral)   Resp 18   Ht 5' 2\" (1.575 m)   Wt 218 lb (98.9 kg)   SpO2 93%   BMI 39.87 kg/m²    Constitutional: Oriented to person, place, and time. Appears well-developed and well-nourished. Head: Normocephalic and atraumatic. Neck: Neck supple. Cardiovascular: Normal rate and regular rhythm with no murmur -No JVP  Pulmonary/Chest: Breath sounds normal.   Abdominal: Soft. Musculoskeletal: No edema. Neurological: Alert and oriented to person, place, and time. Skin: Skin is warm and dry. Psychiatric: Normal mood and affect. Vitals reviewed        Medical problems and test results were reviewed with the patient today.      Wt Readings from Last 3 Encounters:   02/08/23 218 lb (98.9 kg)   11/22/22 223 lb (101.2 kg)   11/08/22 225 lb (102.1 kg)          Recent Results (from the past 672 hour(s))   EKG 12 Lead    Collection Time: 02/07/23  5:30 PM   Result Value Ref Range    Ventricular Rate 67 BPM    Atrial Rate 67 BPM    P-R Interval 150 ms    QRS Duration 72 ms    Q-T Interval 418 ms    QTc Calculation (Bazett) 441 ms    P Axis 27 degrees    R Axis 58 degrees    T Axis 113 degrees    Diagnosis Normal sinus rhythm    CBC    Collection Time: 02/07/23  5:32 PM   Result Value Ref Range    WBC 8.7 4.3 - 11.1 K/uL    RBC 4.77 4.05 - 5.2 M/uL    Hemoglobin 14.7 11.7 - 15.4 g/dL    Hematocrit 44.3 35.8 - 46.3 %    MCV 92.9 82 - 102 FL    MCH 30.8 26.1 - 32.9 PG MCHC 33.2 31.4 - 35.0 g/dL    RDW 13.8 11.9 - 14.6 %    Platelets 236 249 - 690 K/uL    MPV 11.4 9.4 - 12.3 FL    nRBC 0.00 0.0 - 0.2 K/uL   Comprehensive Metabolic Panel    Collection Time: 02/07/23  5:32 PM   Result Value Ref Range    Sodium 138 133 - 143 mmol/L    Potassium 3.4 (L) 3.5 - 5.1 mmol/L    Chloride 106 101 - 110 mmol/L    CO2 29 21 - 32 mmol/L    Anion Gap 3 2 - 11 mmol/L    Glucose 158 (H) 65 - 100 mg/dL    BUN 13 8 - 23 MG/DL    Creatinine 0.80 0.6 - 1.0 MG/DL    Est, Glom Filt Rate >60 >60 ml/min/1.73m2    Calcium 9.4 8.3 - 10.4 MG/DL    Total Bilirubin 0.4 0.2 - 1.1 MG/DL    ALT 17 12 - 65 U/L    AST 10 (L) 15 - 37 U/L    Alk Phosphatase 70 50 - 136 U/L    Total Protein 7.0 6.3 - 8.2 g/dL    Albumin 3.4 3.2 - 4.6 g/dL    Globulin 3.6 2.8 - 4.5 g/dL    Albumin/Globulin Ratio 0.9 0.4 - 1.6     Troponin    Collection Time: 02/07/23  5:32 PM   Result Value Ref Range    Troponin, High Sensitivity 19.4 (H) 0 - 14 pg/mL   Lipase    Collection Time: 02/07/23  5:32 PM   Result Value Ref Range    Lipase 79 73 - 393 U/L   Troponin    Collection Time: 02/07/23 10:16 PM   Result Value Ref Range    Troponin, High Sensitivity 1,390.7 (HH) 0 - 14 pg/mL   APTT    Collection Time: 02/08/23  1:03 AM   Result Value Ref Range    PTT 29.2 24.5 - 34.2 SEC   Protime-INR    Collection Time: 02/08/23  1:03 AM   Result Value Ref Range    Protime 13.0 12.6 - 14.3 sec    INR 0.9     Anti-Xa, Unfractionated Heparin    Collection Time: 02/08/23  6:10 AM   Result Value Ref Range    Anti-XA Unfrac Heparin <0.10 (L) 0.3 - 0.7 IU/mL   Troponin    Collection Time: 02/08/23  6:10 AM   Result Value Ref Range    Troponin, High Sensitivity 1,382.4 (HH) 0 - 14 pg/mL   Basic Metabolic Panel w/ Reflex to MG    Collection Time: 02/08/23  6:10 AM   Result Value Ref Range    Sodium 143 133 - 143 mmol/L    Potassium 3.2 (L) 3.5 - 5.1 mmol/L    Chloride 109 101 - 110 mmol/L    CO2 25 21 - 32 mmol/L    Anion Gap 9 2 - 11 mmol/L    Glucose 101 (H) 65 - 100 mg/dL    BUN 12 8 - 23 MG/DL    Creatinine 0.70 0.6 - 1.0 MG/DL    Est, Glom Filt Rate >60 >60 ml/min/1.73m2    Calcium 9.2 8.3 - 10.4 MG/DL   CBC    Collection Time: 02/08/23  6:10 AM   Result Value Ref Range    WBC 9.8 4.3 - 11.1 K/uL    RBC 4.59 4.05 - 5.2 M/uL    Hemoglobin 13.9 11.7 - 15.4 g/dL    Hematocrit 42.3 35.8 - 46.3 %    MCV 92.2 82 - 102 FL    MCH 30.3 26.1 - 32.9 PG    MCHC 32.9 31.4 - 35.0 g/dL    RDW 13.8 11.9 - 14.6 %    Platelets 295 672 - 572 K/uL    MPV 11.6 9.4 - 12.3 FL    nRBC 0.00 0.0 - 0.2 K/uL   Lipid Panel    Collection Time: 02/08/23  6:10 AM   Result Value Ref Range    Cholesterol, Total 128 <200 MG/DL    Triglycerides 123 35 - 150 MG/DL    HDL 45 40 - 60 MG/DL    LDL Calculated 58.4 <100 MG/DL    VLDL Cholesterol Calculated 24.6 (H) 6.0 - 23.0 MG/DL    Chol/HDL Ratio 2.8     Magnesium    Collection Time: 02/08/23  6:10 AM   Result Value Ref Range    Magnesium 2.1 1.8 - 2.4 mg/dL     Lab Results   Component Value Date/Time    CHOL 128 02/08/2023 06:10 AM    HDL 45 02/08/2023 06:10 AM     EKG shows normal sinus rhythm with inferior ST depression    ASSESSMENT and PLAN      17-year-old female with non-STEMI. We will proceed with cardiac catheterization. Risk and benefits of the procedure and explained to the patient agrees to proceed. Thank you for allowing me to participate in this patient's care. Please call or contact me if there are any questions or concerns regarding the above.       Estuardo Cisneros MD  02/08/23  9:26 AM

## 2023-02-08 NOTE — ED NOTES
TRANSFER - OUT REPORT:    Verbal report given to RN on Jesus Leigh  being transferred to  for routine progression of patient care       Report consisted of patient's Situation, Background, Assessment and   Recommendations(SBAR). Information from the following report(s) Nurse Handoff Report, ED Encounter Summary, ED SBAR, Adult Overview, MAR, Recent Results and Cardiac Rhythm nsr was reviewed with the receiving nurse. [place] Assessment: Presents to emergency department  because of falls (Syncope, seizure, or loss of consciousness): No, Age > 79: No, Altered Mental Status, Intoxication with alcohol or substance confusion (Disorientation, impaired judgment, poor safety awaremess, or inability to follow instructions): No, Impaired Mobility: Ambulates or transfers with assistive devices or assistance; Unable to ambulate or transer.: Yes  Lines:   Peripheral IV 02/07/23 Right Antecubital (Active)   Site Assessment Clean, dry & intact 02/07/23 1463       Peripheral IV 02/08/23 Left;Proximal;Anterior Forearm (Active)        Opportunity for questions and clarification was provided.       Patient transported with:  Monitor and Registered Nurse           Roberto Scheuermann, RN  02/08/23 5438

## 2023-02-08 NOTE — ED NOTES
Pt left due to extended wait. Jyotsna RODRIGUEZ removed pt IV prior to pt leaving.      León Terrazas RN  02/07/23 2053

## 2023-02-09 VITALS
HEART RATE: 63 BPM | RESPIRATION RATE: 19 BRPM | WEIGHT: 219.9 LBS | HEIGHT: 62 IN | BODY MASS INDEX: 40.46 KG/M2 | SYSTOLIC BLOOD PRESSURE: 140 MMHG | OXYGEN SATURATION: 94 % | DIASTOLIC BLOOD PRESSURE: 71 MMHG | TEMPERATURE: 98.6 F

## 2023-02-09 LAB
ANION GAP SERPL CALC-SCNC: 8 MMOL/L (ref 2–11)
BUN SERPL-MCNC: 13 MG/DL (ref 8–23)
CALCIUM SERPL-MCNC: 9.3 MG/DL (ref 8.3–10.4)
CHLORIDE SERPL-SCNC: 107 MMOL/L (ref 101–110)
CO2 SERPL-SCNC: 27 MMOL/L (ref 21–32)
CREAT SERPL-MCNC: 0.8 MG/DL (ref 0.6–1)
ERYTHROCYTE [DISTWIDTH] IN BLOOD BY AUTOMATED COUNT: 13.8 % (ref 11.9–14.6)
EST. AVERAGE GLUCOSE BLD GHB EST-MCNC: 108 MG/DL
GLUCOSE SERPL-MCNC: 110 MG/DL (ref 65–100)
HBA1C MFR BLD: 5.4 % (ref 4.8–5.6)
HCT VFR BLD AUTO: 39.7 % (ref 35.8–46.3)
HGB BLD-MCNC: 12.8 G/DL (ref 11.7–15.4)
MCH RBC QN AUTO: 30.3 PG (ref 26.1–32.9)
MCHC RBC AUTO-ENTMCNC: 32.2 G/DL (ref 31.4–35)
MCV RBC AUTO: 94.1 FL (ref 82–102)
NRBC # BLD: 0 K/UL (ref 0–0.2)
PLATELET # BLD AUTO: 200 K/UL (ref 150–450)
PMV BLD AUTO: 11.7 FL (ref 9.4–12.3)
POTASSIUM SERPL-SCNC: 3.8 MMOL/L (ref 3.5–5.1)
RBC # BLD AUTO: 4.22 M/UL (ref 4.05–5.2)
SODIUM SERPL-SCNC: 142 MMOL/L (ref 133–143)
T4 FREE SERPL-MCNC: 1.3 NG/DL (ref 0.78–1.46)
TSH W FREE THYROID IF ABNORMAL: 8.22 UIU/ML (ref 0.36–3.74)
WBC # BLD AUTO: 6.5 K/UL (ref 4.3–11.1)

## 2023-02-09 PROCEDURE — 85027 COMPLETE CBC AUTOMATED: CPT

## 2023-02-09 PROCEDURE — 84443 ASSAY THYROID STIM HORMONE: CPT

## 2023-02-09 PROCEDURE — 83036 HEMOGLOBIN GLYCOSYLATED A1C: CPT

## 2023-02-09 PROCEDURE — 6370000000 HC RX 637 (ALT 250 FOR IP): Performed by: NURSE PRACTITIONER

## 2023-02-09 PROCEDURE — 80048 BASIC METABOLIC PNL TOTAL CA: CPT

## 2023-02-09 PROCEDURE — 6370000000 HC RX 637 (ALT 250 FOR IP): Performed by: INTERNAL MEDICINE

## 2023-02-09 PROCEDURE — 83695 ASSAY OF LIPOPROTEIN(A): CPT

## 2023-02-09 PROCEDURE — 84439 ASSAY OF FREE THYROXINE: CPT

## 2023-02-09 PROCEDURE — 36415 COLL VENOUS BLD VENIPUNCTURE: CPT

## 2023-02-09 RX ORDER — NITROGLYCERIN 0.4 MG/1
0.4 TABLET SUBLINGUAL EVERY 5 MIN PRN
Qty: 25 TABLET | Refills: 3 | Status: SHIPPED | OUTPATIENT
Start: 2023-02-09

## 2023-02-09 RX ORDER — COLCHICINE 0.6 MG/1
0.6 TABLET ORAL DAILY
Status: DISCONTINUED | OUTPATIENT
Start: 2023-02-09 | End: 2023-02-09 | Stop reason: HOSPADM

## 2023-02-09 RX ORDER — CLOPIDOGREL BISULFATE 75 MG/1
75 TABLET ORAL DAILY
Qty: 30 TABLET | Refills: 11 | Status: SHIPPED | OUTPATIENT
Start: 2023-02-10

## 2023-02-09 RX ORDER — LOSARTAN POTASSIUM 25 MG/1
25 TABLET ORAL DAILY
Status: DISCONTINUED | OUTPATIENT
Start: 2023-02-09 | End: 2023-02-09 | Stop reason: HOSPADM

## 2023-02-09 RX ORDER — LOSARTAN POTASSIUM 25 MG/1
25 TABLET ORAL DAILY
Qty: 30 TABLET | Refills: 5 | Status: SHIPPED | OUTPATIENT
Start: 2023-02-09

## 2023-02-09 RX ORDER — ROSUVASTATIN CALCIUM 20 MG/1
20 TABLET, COATED ORAL NIGHTLY
Qty: 30 TABLET | Refills: 5 | Status: SHIPPED | OUTPATIENT
Start: 2023-02-09

## 2023-02-09 RX ORDER — LISINOPRIL 5 MG/1
10 TABLET ORAL DAILY
Status: DISCONTINUED | OUTPATIENT
Start: 2023-02-09 | End: 2023-02-09

## 2023-02-09 RX ORDER — COLCHICINE 0.6 MG/1
0.6 TABLET ORAL DAILY
Qty: 30 TABLET | Refills: 2 | Status: SHIPPED | OUTPATIENT
Start: 2023-02-10

## 2023-02-09 RX ADMIN — LEVOTHYROXINE SODIUM 125 MCG: 0.12 TABLET ORAL at 05:54

## 2023-02-09 RX ADMIN — CLOPIDOGREL BISULFATE 75 MG: 75 TABLET ORAL at 09:03

## 2023-02-09 RX ADMIN — METOPROLOL TARTRATE 25 MG: 25 TABLET, FILM COATED ORAL at 09:03

## 2023-02-09 RX ADMIN — ASPIRIN 81 MG 81 MG: 81 TABLET ORAL at 09:03

## 2023-02-09 RX ADMIN — LOSARTAN POTASSIUM 25 MG: 25 TABLET, FILM COATED ORAL at 09:34

## 2023-02-09 RX ADMIN — COLCHICINE 0.6 MG: 0.6 TABLET, FILM COATED ORAL at 09:03

## 2023-02-09 NOTE — CARE COORDINATION
Discharge order is in. Pt was admitted for planned C; underwent cardiac cath Dr. Geoffrey Mckinley on 2/08/23. Pt tolerated the procedure well and was taken to the telemetry floor for recovery. No discharge needs were identified. Tx goals have been met. 02/09/23 [address] Discharge   Transition of Care Consult (CM Consult) Discharge [address] Discharge None   [place] Resource Information Provided? No   Mode of Transport at Discharge Other (see comment)  (Family)   Confirm Follow Up Transport Family   Condition of Participation: Discharge Planning   The Patient and/or Patient Representative was provided with a Choice of Provider? Patient   The Patient and/Or Patient Representative agree with the Discharge Plan? Yes   Freedom of Choice list was provided with basic dialogue that supports the patient's individualized plan of care/goals, treatment preferences, and shares the quality data associated with the providers?   Yes

## 2023-02-09 NOTE — PROGRESS NOTES
I have discharged the patient. After further education the patient understands teaching. No further questions. IV taken out and tele monitor returned.

## 2023-02-09 NOTE — PROGRESS NOTES
Cardiac Rehab: Spoke with patient regarding referral to cardiac rehab. Patient meets admission criteria based on NSTEMI with PCI (2/8/23). Written information about Cardiac Rehab given and reviewed with patient. Discussed lifestyle modifications to promote cardiac wellness. She did participate in Cardiac Rehab at [facility] in 2021. Patient indicated that she wants to repeat the cardiac rehab program at the [facility] program which is closer to her home in [place]. His Cardiologist is Dr. Andres Luo.       Thank you,  MARGARITA Montes De OcaN, RN  Cardiopulmonary Rehabilitation Nurse Liaison  [facility]

## 2023-02-09 NOTE — DISCHARGE SUMMARY
[facility] Cardiology Discharge Summary     Patient ID:  Salome Sequeira  [id number]  63 y.o.  1960    Admit date: 2/7/2023    Discharge date:  02/09/23     Admitting Physician: Michelle Austin MD     Discharge Physician: KIM Kaiser CNP/ [address] [facility]     Admission Diagnoses: Abnormal ECG [R94.31]  NSTEMI (non-ST elevated myocardial infarction) [facility]) [I21.4]  Chest pain, unspecified type [R07.9]    Discharge Diagnoses:   Patient Active Problem List    Diagnosis Date Noted    NSTEMI (non-ST elevated myocardial infarction) ([facility] [address] 02/08/2023    Coronary artery disease involving native coronary artery of native heart without angina pectoris 01/25/2021    CHOUDHARY (dyspnea on exertion) 01/19/2021    Nicotine use disorder 01/19/2021    HTN (hypertension) 01/19/2021    FH: CAD (coronary artery disease) 01/19/2021    JAMES (obstructive sleep apnea)     GERD (gastroesophageal reflux disease)     HLD (hyperlipidemia)     Tobacco use 05/01/2018    Chronic pain 05/01/2018       Cardiology Procedures this admission:  Left heart catheterization with PCI  EchoCardiogram  Consults: none    Hospital Course: Patient  with past medical history of CAD with PCI x 2 in 2021, VFIB during [address] [place] [address] in 2021, HTN, HLD, hypothyroidism and tobacco abuse who presented to the ER with complaints of chest pain. She describes her pain as tightness that went across her chest with radiation into her jaws. States that her pain went away while sitting in the ER waiting so she left due to the long wait. She states that she was called and told to come back to the ER. Upon arrival to the ER, EKG showed SR with ST depression in inferior leads. Initial hs-troponin was 19.4 with repeat of 1390.7. She was given 325mg ASA and IV heparin in the ER. At the time of interview, patient was chest pain free but signifiacantly hypertensive with . She was admitted to tele floor for planned LHC.    Patient tolerated the procedure well and was taken to the telemetry floor for recovery. Patient underwent cardiac catheterization by Dr. Katja Solorzano on 2/8/2023. Patient was found to have a 95% stenosis of the dLAD that was stented with a 2.75 x 22 Heartwell drug-eluting stent with 0% residual stenosis. An echocardiogram showed 02/07/23    TRANSTHORACIC ECHOCARDIOGRAM (TTE) COMPLETE (CONTRAST/BUBBLE/3D PRN) 02/08/2023 11:21 AM (Final)    Interpretation Summary    Left Ventricle: Normal left ventricular systolic function. EF by 2D Simpsons Biplane is 60%. Left ventricle size is normal. Increased wall thickness. Normal wall motion. Normal diastolic function. Mitral Valve: Mildly thickened leaflet, at the posterior leaflet. Aorta: Ao root diameter is 3.1 cm. Ao Root Index is 1.57 cm/m2. Technical qualifiers: Color flow Doppler was performed and pulse wave and/or continuous wave Doppler was performed. Signed by: Mari Odonnell MD on 2/8/2023 11:21 AM     The morning of 2/9/2023, the patient was up feeling well without any complaints of chest pain or shortness of breath. Patient's right radial cath site was clean, dry and intact without hematoma or bruit. Patient's labs were WNL. Patient was seen and examined by Dr. Ana María Lyons and determined stable and ready for discharge. Patient was instructed on the importance of medication compliance including taking aspirin and plavix everyday without missing a dose. After receiving drug eluting stents, the patient will remain on dual anti-platelet therapy for 1 year. For maximized medical therapy for CAD, patient will continue BB, ARB (switched from ACE due to intolerance in past and not taking), and high intensity statin as well. The patient will follow up with [facility] Cardiology Dr. Ernesto West (TC-7) on 2/16/2023 at [phone] am in [facility]. The patient has been referred to cardiac rehab.     DISPOSITION: The patient is being discharged home in stable condition on a low saturated fat, low cholesterol and low salt diet. The patient is instructed to advance activities as tolerated to the limit of fatigue or shortness of breath. The patient is instructed to avoid all heavy lifting for 5 days. The patient is instructed to watch the cath site for bleeding/oozing; if seen, the patient is instructed to apply firm pressure with a clean cloth and call [facility] at [phone]. The patient is instructed to watch for signs of infection which include: increasing area of redness, fever/hot to touch or purulent drainage at the catheterization site. The patient is instructed not to soak in a bathtub for 7-10 days, but is cleared to shower. The patient is instructed to call the office or return to the ER for immediate evaluation for any shortness of breath or chest pain not relieved by NTG. Discharge Exam: BP (!) 140/71   Pulse 63   Temp 98.6 °F (37 °C) (Oral)   Resp 19   Ht 5' 2\" (1.575 m)   Wt 219 lb 14.4 oz (99.7 kg)   SpO2 94%   BMI 40.22 kg/m²   Patient has been seen by Dr. Hoang Foster: see his progress note for exam details.     Recent Results (from the past 24 hour(s))   Cardiac procedure    Collection Time: 02/08/23 10:19 AM   Result Value Ref Range    Body Surface Area 2.08 m2   Transthoracic echocardiogram (TTE) complete with contrast, bubble, strain, and 3D PRN    Collection Time: 02/08/23 10:56 AM   Result Value Ref Range    LV EDV A2C 97 mL    LV EDV A4C 91 mL    LV ESV A2C 44 mL    LV ESV A4C 33 mL    IVSd 1.5 (A) 0.6 - 0.9 cm    LVIDd 4.1 3.9 - 5.3 cm    LVIDs 2.7 cm    LVOT Diameter 2.0 cm    LVOT Mean Gradient 3 mmHg    LVOT VTI 31.4 cm    LVOT Peak Velocity 1.2 m/s    LVOT Peak Gradient 6 mmHg    LVPWd 1.4 (A) 0.6 - 0.9 cm    LV E' Lateral Velocity 8 cm/s    LV E' Septal Velocity 6 cm/s    LV Ejection Fraction A2C 54 %    LV Ejection Fraction A4C 64 %    EF BP 60 55 - 100 %    LVOT Area 3.1 cm2    LVOT SV 98.6 ml    LA Minor Axis 5.4 cm    LA Major Axis 6.0 cm    LA Area 2C 17.5 cm2    LA Area 4C 18.0 cm2    LA Volume 2C 45 22 - 52 mL    LA Volume 4C 43 22 - 52 mL    LA Volume BP 47 22 - 52 mL    LA Diameter 3.6 cm    AV Mean Velocity 1.0 m/s    AV Mean Gradient 5 mmHg    AV VTI 37.3 cm    AV Peak Velocity 1.5 m/s    AV Peak Gradient 9 mmHg    AV Area by VTI 2.6 cm2    AV Area by Peak Velocity 2.5 cm2    Aortic Root 3.1 cm    Ascending Aorta 3.5 cm    IVC Proxmal 1.7 cm    MV E Wave Deceleration Time 379.0 ms    MV A Velocity 1.24 m/s    MV E Velocity 0.80 m/s    MV Mean Gradient 2 mmHg    MV VTI 40.2 cm    MV Mean Velocity 0.6 m/s    MV Max Velocity 1.2 m/s    MV Peak Gradient 6 mmHg    MV Area by VTI 2.5 cm2    PV AT 87.0 ms    PV Max Velocity 1.0 m/s    PV Peak Gradient 4 mmHg    Est. RA Pressure 3 mmHg    RVIDd 2.8 cm    RV Basal Dimension 4.0 cm    RV Free Wall Peak S' 13 cm/s    TAPSE 2.0 1.7 cm    Body Surface Area 2.08 m2    Fractional Shortening 2D 34 28 - 44 %    LV ESV Index A4C 17 mL/m2    LV EDV Index A4C 46 mL/m2    LV ESV Index A2C 22 mL/m2    LV EDV Index A2C 49 mL/m2    LVIDd Index 2.07 cm/m2    LVIDs Index 1.36 cm/m2    LV RWT Ratio 0.68     LV Mass 2D 228.6 (A) 67 - 162 g    LV Mass 2D Index 115.5 (A) 43 - 95 g/m2    MV E/A 0.65     E/E' Ratio (Averaged) 11.67     E/E' Lateral 10.00     E/E' Septal 13.33     LA Volume Index BP 24 16 - 34 ml/m2    LVOT Stroke Volume Index 49.8 mL/m2    LA Volume Index 2C 23 16 - 34 mL/m2    LA Volume Index 4C 22 16 - 34 mL/m2    LA Size Index 1.82 cm/m2    LA/AO Root Ratio 1.16     Ao Root Index 1.57 cm/m2    Ascending Aorta Index 1.77 cm/m2    AV Velocity Ratio 0.80     LVOT:AV VTI Index 0.84     ALESSANDRO/BSA VTI 1.3 cm2/m2    ALESSANDRO/BSA Peak Velocity 1.3 cm2/m2    MV:LVOT VTI Index 1.28    EKG 12 lead    Collection Time: 02/08/23  2:24 PM   Result Value Ref Range    Ventricular Rate 54 BPM    Atrial Rate 54 BPM    P-R Interval 158 ms    QRS Duration 70 ms    Q-T Interval 448 ms    QTc Calculation (Bazett) 424 ms    P Axis 7 degrees    R Axis 41 degrees    T Axis 114 degrees    Diagnosis Sinus bradycardia with Premature atrial complexes    CBC    Collection Time: 02/09/23  3:47 AM   Result Value Ref Range    WBC 6.5 4.3 - 11.1 K/uL    RBC 4.22 4.05 - 5.2 M/uL    Hemoglobin 12.8 11.7 - 15.4 g/dL    Hematocrit 39.7 35.8 - 46.3 %    MCV 94.1 82 - 102 FL    MCH 30.3 26.1 - 32.9 PG    MCHC 32.2 31.4 - 35.0 g/dL    RDW 13.8 11.9 - 14.6 %    Platelets 442 273 - 039 K/uL    MPV 11.7 9.4 - 12.3 FL    nRBC 0.00 0.0 - 0.2 K/uL   Basic Metabolic Panel    Collection Time: 02/09/23  3:47 AM   Result Value Ref Range    Sodium 142 133 - 143 mmol/L    Potassium 3.8 3.5 - 5.1 mmol/L    Chloride 107 101 - 110 mmol/L    CO2 27 21 - 32 mmol/L    Anion Gap 8 2 - 11 mmol/L    Glucose 110 (H) 65 - 100 mg/dL    BUN 13 8 - 23 MG/DL    Creatinine 0.80 0.6 - 1.0 MG/DL    Est, Glom Filt Rate >60 >60 ml/min/1.73m2    Calcium 9.3 8.3 - 10.4 MG/DL         Patient Instructions:   Current Discharge Medication List        START taking these medications    Details   losartan (COZAAR) 25 MG tablet Take 1 tablet by mouth daily  Qty: 30 tablet, Refills: 5      clopidogrel (PLAVIX) 75 MG tablet Take 1 tablet by mouth daily  Qty: 30 tablet, Refills: 11      colchicine (COLCRYS) 0.6 MG tablet Take 1 tablet by mouth daily  Qty: 30 tablet, Refills: 2      metoprolol tartrate (LOPRESSOR) 25 MG tablet Take 1 tablet by mouth 2 times daily  Qty: 60 tablet, Refills: 11           CONTINUE these medications which have CHANGED    Details   nitroGLYCERIN (NITROSTAT) 0.4 MG SL tablet Place 1 tablet under the tongue every 5 minutes as needed for Chest pain  Qty: 25 tablet, Refills: 3      rosuvastatin (CRESTOR) 20 MG tablet Take 1 tablet by mouth nightly  Qty: 30 tablet, Refills: 5           CONTINUE these medications which have NOT CHANGED    Details   albuterol sulfate  (90 Base) MCG/ACT inhaler Inhale 2 puffs into the lungs every 4 hours as needed      aspirin 81 MG chewable tablet Take 81 mg by mouth daily fluticasone (FLONASE) 50 MCG/ACT nasal spray 2 sprays by Nasal route daily      HYDROcodone-acetaminophen (NORCO)  MG per tablet Take 2 tablets by mouth 3 times daily as needed.       levothyroxine (SYNTHROID) 75 MCG tablet Take 75 mcg by mouth every morning (before breakfast)           STOP taking these medications       lisinopril (PRINIVIL;ZESTRIL) 20 MG tablet Comments:   Reason for Stopping:         ibuprofen (ADVIL;MOTRIN) 600 MG tablet Comments:   Reason for Stopping:                  Signed:  KIM Montesinos - CNP-C  2/9/2023  9:26 AM

## 2023-02-09 NOTE — PROGRESS NOTES
am  2/9/2023 7:14 AM    Admit Date: 2/7/2023    Admit Diagnosis: Abnormal ECG [R94.31]  NSTEMI (non-ST elevated myocardial infarction) [facility]) [I21.4]  Chest pain, unspecified type [R07.9]      Subjective:    Patient : Dominique Dyer is a 63 y.o. female with past medical history of CAD with PCI x 2 in 2021, VFIB during [facility] in 2021, HTN, HLD, hypothyroidism and tobacco abuse who presented to the ER with complaints of chest pain. She describes her pain as tightness that went across her chest with radiation into her jaws. States that her pain went away while sitting in the ER waiting so she left due to the long wait. She states that she was called and told to come back to the ER. Upon arrival to the ER, EKG showed SR with ST depression in inferior leads. Initial hs-troponin was 19.4 with repeat of 1390.7. She was given 325mg ASA and IV heparin in the ER. At the time of interview, patient was chest pain free but hypertensive with . She reports recent daily headaches at home as well. Only complaint at this time is a headache.     02/09/23  Patient s/p LHC with angioplasty and stenting of the mid-LAD performed yesterday by Dr. Hieu Stafford. Normal LV systolic function. Today, patient is doing well. No acute events overnight. Vital signs are stable at this time. No complaints and does not have any bleeding from cath sites.      Objective:    /65   Pulse 57   Temp 98.1 °F (36.7 °C)   Resp 18   Ht 5' 2\" (1.575 m)   Wt 219 lb 14.4 oz (99.7 kg)   SpO2 92%   BMI 40.22 kg/m²     ROS:  General ROS: negative for - chills  Hematological and Lymphatic ROS: negative for - blood clots or jaundice  Respiratory ROS: no cough, shortness of breath, or wheezing  Cardiovascular ROS: positive for - chest pain  Gastrointestinal ROS: no abdominal pain, change in bowel habits, or black or bloody stools  Neurological ROS: no TIA or stroke symptoms    Physical Exam:      Physical Examination: General appearance - Appearance: alert, well appearing, and in no distress and oriented to person, place, and time.    Neck/lymph - supple, no significant adenopathy  Chest/CV - clear to auscultation, no wheezes, rales or rhonchi, symmetric air entry  Heart - normal rate, regular rhythm, normal S1, S2, no murmurs, rubs, clicks or gallops  Abdomen/GI - soft, nontender, nondistended, no masses or organomegaly   Musculoskeletal - no joint tenderness, deformity or swelling  Extremities - peripheral pulses normal, no pedal edema, no clubbing or cyanosis  Skin - normal coloration and turgor, no rashes, no suspicious skin lesions noted    Current Facility-Administered Medications   Medication Dose Route Frequency    heparin (porcine) injection 4,000 Units  4,000 Units IntraVENous PRN    heparin (porcine) injection 2,000 Units  2,000 Units IntraVENous PRN    heparin 25,000 units in dextrose 5% 250 mL (premix) infusion  5-30 Units/kg/hr IntraVENous Continuous    aspirin chewable tablet 81 mg  81 mg Oral Daily    HYDROcodone-acetaminophen (NORCO)  MG per tablet 2 tablet  2 tablet Oral TID PRN    levothyroxine (SYNTHROID) tablet 125 mcg  125 mcg Oral QAM AC    fluticasone (FLONASE) 50 MCG/ACT nasal spray 2 spray  2 spray Nasal Daily    rosuvastatin (CRESTOR) tablet 20 mg  20 mg Oral Nightly    0.9 % sodium chloride infusion   IntraVENous Continuous    sodium chloride flush 0.9 % injection 5-40 mL  5-40 mL IntraVENous 2 times per day    sodium chloride flush 0.9 % injection 5-40 mL  5-40 mL IntraVENous PRN    ondansetron (ZOFRAN) injection 4 mg  4 mg IntraVENous Q4H PRN    polyethylene glycol (GLYCOLAX) packet 17 g  17 g Oral Daily PRN    nitroGLYCERIN (NITROSTAT) SL tablet 0.4 mg  0.4 mg SubLINGual Q5 Min PRN    potassium chloride (KLOR-CON M) extended release tablet 40 mEq  40 mEq Oral PRN    Or    potassium bicarb-citric acid (EFFER-K) effervescent tablet 40 mEq  40 mEq Oral PRN    Or    potassium chloride 10 mEq/100 mL IVPB (Peripheral Line)  10 mEq IntraVENous PRN    magnesium sulfate 2000 mg in 50 mL IVPB premix  2,000 mg IntraVENous PRN    metoprolol tartrate (LOPRESSOR) tablet 25 mg  25 mg Oral BID    nitroglycerin (NITRO-BID) 2 % ointment 1 inch  1 inch Topical 4 times per day    morphine injection 2 mg  2 mg IntraVENous Q4H PRN    hydrALAZINE (APRESOLINE) injection 10 mg  10 mg IntraVENous Q6H PRN    sodium chloride flush 0.9 % injection 5-40 mL  5-40 mL IntraVENous 2 times per day    sodium chloride flush 0.9 % injection 5-40 mL  5-40 mL IntraVENous PRN    0.9 % sodium chloride infusion   IntraVENous PRN    acetaminophen (TYLENOL) tablet 650 mg  650 mg Oral Q4H PRN    clopidogrel (PLAVIX) tablet 75 mg  75 mg Oral Daily       Data Review: data included in this note has been independently reviewed by the author     TELEMETRY: NSR    Assessment/Plan:     Patient Active Problem List   Diagnosis    Abnormal abdominal CT scan    Restless legs    CHOUDHARY (dyspnea on exertion)    Recurrent major depressive disorder, in partial remission ([address]    Anxiety    Postmenopausal disorder    Calcium kidney stones    Tobacco use    Pulmonary nodule    Other specified hypothyroidism    Nicotine use disorder    Neck pain    Shortness of breath    Cyst of joint of shoulder    JAMES (obstructive sleep apnea)    Coronary artery disease involving native coronary artery of native heart without angina pectoris    Acne    Generalized pain    Chronic pain    Backache    Spasm of colon    GERD (gastroesophageal reflux disease)    HTN (hypertension)    Osteopenia    Arthritis    Microhematuria    Night sweats    Low vitamin B12 level    Cervical strain    Encounter for long-term (current) use of other medications    FH: CAD (coronary artery disease)    Chronic right shoulder pain    HLD (hyperlipidemia)    Obesity, morbid ([address]    NSTEMI (non-ST elevated myocardial infarction) ([address]     PLAN    NSTEMI (non-ST elevated myocardial infarction) ([facility] [address]   - S/p PCI with stenting of mid-LAD performed yesterday. - Currently on heparin drip and IVF  - Continue ASA 81mg, Plavix 75mg, Lopressor 25mg BID, Crestor 20mg QD        Tobacco use -- importance of cessation discussed and encouraged       Coronary artery disease involving native coronary artery of native heart without angina pectoris -- [facility] 1/19/21 with PCI to OM1 and PCI to pLAD. Patient had VFIB arrest while on the table. See above for most recent PCI with stenting       HTN (hypertension) --   - Well controlled. Continue Lopressor 25mg BID  - The current medical regimen is effective;  continue present plan and medications. HLD (hyperlipidemia) --   - Continue Crestor 20mg QD    We will assess LP(a) hemoglobin A1c TSH with reflex. Start colchicine for post non-STEMI risk reduction. Start ACE inhibitor for post MI    Deanna Bowden  I have personally seen and evaluated the patient and reviewed the students note and agree with the following assessment and plan and findings. I was present for and observed the key components of this note. Any appropriate additions or editing of the information have been done by me.     Yolis Puente MD, [facility]  Cardiology

## 2023-02-10 ENCOUNTER — TELEPHONE (OUTPATIENT)
Dept: CARDIOLOGY CLINIC | Age: 63
End: 2023-02-10

## 2023-02-10 LAB — LPA SERPL-SCNC: 51.1 NMOL/L

## 2023-02-10 NOTE — TELEPHONE ENCOUNTER
Transitional Care fu after DC from [facility] 2/9/23 [facility] w/PCI. I spoke w/pt. she is doing well,has all her meds,cath site looks good w/no signs of infection. I went over DC summary including diet,activity,importance of med compliance,dual anti-platelet therapy for a year w/out interruption,s/s of infection ,referral to cardiac rehab and fu appt. 2/16 11:45. All questions answered and pt.advised to call PRN and v/u.

## 2023-02-16 ENCOUNTER — OFFICE VISIT (OUTPATIENT)
Dept: CARDIOLOGY CLINIC | Age: 63
End: 2023-02-16

## 2023-02-16 ENCOUNTER — TELEPHONE (OUTPATIENT)
Dept: CARDIOLOGY CLINIC | Age: 63
End: 2023-02-16

## 2023-02-16 VITALS
HEART RATE: 56 BPM | WEIGHT: 220.8 LBS | BODY MASS INDEX: 40.63 KG/M2 | DIASTOLIC BLOOD PRESSURE: 74 MMHG | HEIGHT: 62 IN | SYSTOLIC BLOOD PRESSURE: 124 MMHG

## 2023-02-16 DIAGNOSIS — I21.4 NSTEMI (NON-ST ELEVATED MYOCARDIAL INFARCTION) (HCC): Primary | ICD-10-CM

## 2023-02-16 DIAGNOSIS — Z09 HOSPITAL DISCHARGE FOLLOW-UP: ICD-10-CM

## 2023-02-16 DIAGNOSIS — Z72.0 TOBACCO USE: ICD-10-CM

## 2023-02-16 DIAGNOSIS — Z82.49 FH: CAD (CORONARY ARTERY DISEASE): ICD-10-CM

## 2023-02-16 DIAGNOSIS — I10 PRIMARY HYPERTENSION: ICD-10-CM

## 2023-02-16 DIAGNOSIS — I25.10 CORONARY ARTERY DISEASE INVOLVING NATIVE CORONARY ARTERY OF NATIVE HEART WITHOUT ANGINA PECTORIS: ICD-10-CM

## 2023-02-16 RX ORDER — ERGOCALCIFEROL 1.25 MG/1
CAPSULE ORAL
COMMUNITY

## 2023-02-16 RX ORDER — INDAPAMIDE 1.25 MG/1
TABLET, FILM COATED ORAL
COMMUNITY
Start: 2023-01-19

## 2023-02-16 RX ORDER — ROSUVASTATIN CALCIUM 10 MG/1
10 TABLET, COATED ORAL NIGHTLY
COMMUNITY

## 2023-02-16 RX ORDER — LEVOTHYROXINE SODIUM 0.12 MG/1
TABLET ORAL
COMMUNITY
Start: 2023-01-21

## 2023-02-16 RX ORDER — CARVEDILOL 6.25 MG/1
TABLET ORAL
COMMUNITY
Start: 2022-11-30

## 2023-02-16 NOTE — TELEPHONE ENCOUNTER
----- Message from Natalia Rm DO sent at 2/16/2023 11:39 AM EST -----  Refer to cardiac rehab in [facility] after NSTEMI 2/2023.    Thanks

## 2023-02-16 NOTE — PROGRESS NOTES
[address], [address]  PHONE: [phone]     23    NAME:  Quincy Moreno  : 1960  MRN: [id number]       SUBJECTIVE:   Quincy Moreno is a 63 y.o. female seen for a follow up visit regarding the following:     Chief Complaint   Patient presents with    Follow-Up from Hospital     STEMI        Transitional Care visit:  for NSTEMI admission. Admit date: 2023     Discharge date:  23     HPI:    NSTEMI 3/21/1378: PCI x 2 complicated by Vfib Arrest  NSTEMI 2023: PCI, EF normal      Still smoking, feeling better now, on DAPT. Smoking 1ppd. No new CHOUDHARY, SOB. No new edema. Doing well on meds now. Patient denies recent history of orthopnea, PND, excessive dizziness and/or syncope. Brilinta too costly for her.  now, spouse was my patient. Past Medical History, Past Surgical History, Family history, Social History, and Medications were all reviewed with the patient today and updated as necessary.      Current Outpatient Medications   Medication Sig Dispense Refill    ergocalciferol (ERGOCALCIFEROL) 1.25 MG (00018 UT) capsule ergocalciferol (vitamin D2) 1,250 mcg (50,000 unit) capsule   TAKE 1 CAPSULE ONCE A WEEK FOR VITAMIN D DEFICIENCY      rosuvastatin (CRESTOR) 10 MG tablet Take 10 mg by mouth at bedtime      carvedilol (COREG) 6.25 MG tablet TAKE 1 TABLET BY MOUTH TWICE A DAY      indapamide (LOZOL) 1.25 MG tablet TAKE 1 TABLET EVERY MORNING TO FLUID PILL/LOWER BLOOD PRESSURE      levothyroxine (SYNTHROID) 125 MCG tablet TAKE 1 TABLET BY MOUTH EVERY DAY      nitroGLYCERIN (NITROSTAT) 0.4 MG SL tablet Place 1 tablet under the tongue every 5 minutes as needed for Chest pain 25 tablet 3    losartan (COZAAR) 25 MG tablet Take 1 tablet by mouth daily 30 tablet 5    clopidogrel (PLAVIX) 75 MG tablet Take 1 tablet by mouth daily 30 tablet 11    albuterol sulfate  (90 Base) MCG/ACT inhaler Inhale 2 puffs into the lungs every 4 hours as needed      aspirin 81 MG chewable tablet Take 81 mg by mouth daily      fluticasone (FLONASE) 50 MCG/ACT nasal spray 2 sprays by Nasal route daily      HYDROcodone-acetaminophen (NORCO)  MG per tablet Take 2 tablets by mouth 3 times daily as needed. No current facility-administered medications for this visit.         Allergies   Allergen Reactions    Citalopram Other (See Comments)     Headache    Gabapentin Other (See Comments)     Taking two would cause nightmares    Milnacipran Other (See Comments)     Headache    Pramipexole Nausea Only and Other (See Comments)     Abdominal pain    Varenicline Other (See Comments)     \"Couldn't take\"    Sulfa Antibiotics Nausea And Vomiting     Patient Active Problem List    Diagnosis Date Noted    NSTEMI (non-ST elevated myocardial infarction) ([facility] 02/08/2023     Priority: High    Coronary artery disease involving native coronary artery of native heart without angina pectoris 01/25/2021    CHOUDHAYR (dyspnea on exertion) 01/19/2021    Nicotine use disorder 01/19/2021    HTN (hypertension) 01/19/2021    FH: CAD (coronary artery disease) 01/19/2021    JAMES (obstructive sleep apnea)      Moderate: approved for Provigil. till 5/2019         GERD (gastroesophageal reflux disease)     HLD (hyperlipidemia)     Tobacco use 05/01/2018    Shortness of breath 05/01/2018    Backache     Pulmonary nodule 11/28/2017    [facility] 11/28/2017    Other specified hypothyroidism     Recurrent major depressive disorder, in partial remission (HCC)     Abnormal abdominal CT scan     Restless legs     Postmenopausal disorder     Calcium kidney stones      Probable diagnosis        Neck pain     Cyst of joint of shoulder     Spasm of colon     Night sweats     Low vitamin B12 level     Cervical strain     Chronic right shoulder pain     Anxiety     Acne     Generalized pain     Chronic pain      arthritis in back and legs        Osteopenia     Arthritis Encounter for long-term (current) use of other medications     Microhematuria 2014      Past Surgical History:   Procedure Laterality Date    APPENDECTOMY      CARDIAC PROCEDURE N/A 2023    LEFT HEART CATH / CORONARY ANGIOGRAPHY performed by Sunday Cutler MD at [facility] CATH LAB    CARDIAC PROCEDURE N/A 2023    Percutaneous coronary intervention performed by Sunday Cutler MD at [address]      x 2    CHOLECYSTECTOMY      COLOSTOMY      COLOSTOMY CLOSURE      Xiomy López  2010    [facility]- Dr. Stacie Pulliam      drainage of diverticular abscess    OTHER SURGICAL HISTORY      Laser Conization    TUBAL LIGATION Bilateral     with 2nd     US GUIDED CORE BREAST BIOPSY Bilateral 2012    Sterotactic, benign biopsy report--both showed dystropic calcifications, sclerosing adenosis and ductal hyperplasia     Family History   Problem Relation Age of Onset    Stroke Maternal Grandmother     Heart Disease Father     Cancer Brother         Pancreatic    Heart Attack Brother 62        with stent    Alcohol Abuse Brother     Hypertension Maternal Grandmother     Diabetes Maternal Grandmother     Heart Attack Sister 43        with stent    Stroke Sister     Hypertension Sister     Thyroid Disease Sister     Cancer Sister         Pancreatic    Breast Cancer Neg Hx     Hypertension Mother     Heart Disease Mother         hole in heart; top part swollen    Thyroid Disease Mother     Diabetes Father     Heart Attack Father 61    Cancer Maternal Grandmother         lymph nodes     Social History     Tobacco Use    Smoking status: Every Day     Packs/day: 1.00     Types: Cigarettes    Smokeless tobacco: Never   Substance Use Topics    Alcohol use: No         ROS:    No obvious pertinent positives on review of systems except for what was outlined in the HPI today.       PHYSICAL EXAM:     /74   Pulse 56   Ht 5' 2\" (1.575 m)   Wt 220 lb 12.8 oz (100.2 kg)   BMI 40.38 kg/m²    General/Constitutional:   Alert and oriented x 3, no acute distress  HEENT:   normocephalic, atraumatic, moist mucous membranes  Neck:   No JVD or carotid bruits bilaterally  Cardiovascular:   regular rate and rhythm, no murmur/rub/gallop appreciated  Pulmonary:   clear to auscultation bilaterally, no respiratory distress  Abdomen:   soft, non-tender, non-distended  Ext:   No sig LE edema bilaterally  Skin:  warm and dry, no obvious rashes seen  Neuro:   no obvious sensory or motor deficits  Psychiatric:   normal mood and affect      Lab Results   Component Value Date/Time     02/09/2023 03:47 AM    K 3.8 02/09/2023 03:47 AM     02/09/2023 03:47 AM    CO2 27 02/09/2023 03:47 AM    BUN 13 02/09/2023 03:47 AM    CREATININE 0.80 02/09/2023 03:47 AM    GLUCOSE 110 02/09/2023 03:47 AM    CALCIUM 9.3 02/09/2023 03:47 AM        Lab Results   Component Value Date    WBC 6.5 02/09/2023    HGB 12.8 02/09/2023    HCT 39.7 02/09/2023    MCV 94.1 02/09/2023     02/09/2023       Lab Results   Component Value Date    TSH 1.460 03/31/2021       Lab Results   Component Value Date    LABA1C 5.4 02/09/2023     Lab Results   Component Value Date     02/09/2023       Lab Results   Component Value Date    CHOL 128 02/08/2023    CHOL 185 03/31/2021    CHOL 167 01/20/2021     Lab Results   Component Value Date    TRIG 123 02/08/2023    TRIG 129 03/31/2021    TRIG 119 01/20/2021     Lab Results   Component Value Date    HDL 45 02/08/2023    HDL 49 03/31/2021    HDL 39 (L) 01/20/2021     Lab Results   Component Value Date    LDLCALC 58.4 02/08/2023    LDLCALC 110.2 (H) 03/31/2021    LDLCALC 104.2 (H) 01/20/2021     Lab Results   Component Value Date    LABVLDL 24.6 (H) 02/08/2023    LABVLDL 25.8 (H) 03/31/2021    LABVLDL 23.8 (H) 01/20/2021     Lab Results   Component Value Date    CHOLHDLRATIO 2.8 02/08/2023    CHOLHDLRATIO 3.8 03/31/2021    CHOLHDLRATIO 4.3 01/20/2021           I have Independently reviewed prior care notes, any ER records available, cardiac testing, labs and results with the patient and before seeing the patient today. Also independently reviewed outside records when available. ASSESSMENT:    Boo Leung was seen today for follow-up from hospital.    Diagnoses and all orders for this visit:    NSTEMI (non-ST elevated myocardial infarction) ([facility]    Primary hypertension    Coronary artery disease involving native coronary artery of native heart without angina pectoris  -     Vascular duplex carotid bilateral; Future    FH: CAD (coronary artery disease)    Tobacco use        PLAN:     CAD:  Patient is doing well post stent placement. No recurrent angina. Discussed importance of compliance with dual anti-platelet therapy. Discussed risk of stent thrombosis, myocardial infarction and death if non-compliant. HPL: remain on crestor. Cardiac rehab in [facility]. MUST stop smoking as stressed. Check carotid US. On 2 Bbs, HR low. Stop the metoprolol, remain on coreg. Follow HR and BP. Patient has been instructed and agrees to call our office with any issues or other concerns related to their cardiac condition(s) and/or complaint(s). Return in about 3 months (around 5/16/2023).        JV MANLEY, DO  2/16/2023

## 2023-03-03 ENCOUNTER — HOSPITAL ENCOUNTER (INPATIENT)
Age: 63
LOS: 1 days | Discharge: HOME | DRG: 389 | End: 2023-03-07
Attending: EMERGENCY MEDICINE | Admitting: STUDENT IN AN ORGANIZED HEALTH CARE EDUCATION/TRAINING PROGRAM
Payer: COMMERCIAL

## 2023-03-03 ENCOUNTER — APPOINTMENT (OUTPATIENT)
Dept: CT IMAGING | Age: 63
DRG: 389 | End: 2023-03-03
Payer: COMMERCIAL

## 2023-03-03 DIAGNOSIS — K56.600 PARTIAL SMALL BOWEL OBSTRUCTION (HCC): Primary | ICD-10-CM

## 2023-03-03 LAB
BASOPHILS # BLD: 0 K/UL (ref 0–0.2)
BASOPHILS NFR BLD: 0 % (ref 0–2)
DIFFERENTIAL METHOD BLD: ABNORMAL
EOSINOPHIL # BLD: 0.3 K/UL (ref 0–0.8)
EOSINOPHIL NFR BLD: 2 % (ref 0.5–7.8)
ERYTHROCYTE [DISTWIDTH] IN BLOOD BY AUTOMATED COUNT: 13.2 % (ref 11.9–14.6)
HCT VFR BLD AUTO: 42 % (ref 35.8–46.3)
HGB BLD-MCNC: 14 G/DL (ref 11.7–15.4)
IMM GRANULOCYTES # BLD AUTO: 0 K/UL (ref 0–0.5)
IMM GRANULOCYTES NFR BLD AUTO: 0 % (ref 0–5)
LYMPHOCYTES # BLD: 2.6 K/UL (ref 0.5–4.6)
LYMPHOCYTES NFR BLD: 23 % (ref 13–44)
MCH RBC QN AUTO: 30.8 PG (ref 26.1–32.9)
MCHC RBC AUTO-ENTMCNC: 33.3 G/DL (ref 31.4–35)
MCV RBC AUTO: 92.5 FL (ref 82–102)
MONOCYTES # BLD: 0.8 K/UL (ref 0.1–1.3)
MONOCYTES NFR BLD: 7 % (ref 4–12)
NEUTS SEG # BLD: 7.6 K/UL (ref 1.7–8.2)
NEUTS SEG NFR BLD: 68 % (ref 43–78)
NRBC # BLD: 0 K/UL (ref 0–0.2)
PLATELET # BLD AUTO: 272 K/UL (ref 150–450)
PMV BLD AUTO: 10.5 FL (ref 9.4–12.3)
RBC # BLD AUTO: 4.54 M/UL (ref 4.05–5.2)
WBC # BLD AUTO: 11.3 K/UL (ref 4.3–11.1)

## 2023-03-03 PROCEDURE — 99285 EMERGENCY DEPT VISIT HI MDM: CPT

## 2023-03-03 PROCEDURE — 83690 ASSAY OF LIPASE: CPT

## 2023-03-03 PROCEDURE — 74176 CT ABD & PELVIS W/O CONTRAST: CPT

## 2023-03-03 PROCEDURE — 85025 COMPLETE CBC W/AUTO DIFF WBC: CPT

## 2023-03-03 PROCEDURE — 80053 COMPREHEN METABOLIC PANEL: CPT

## 2023-03-03 RX ORDER — MORPHINE SULFATE 4 MG/ML
4 INJECTION INTRAVENOUS ONCE
Status: COMPLETED | OUTPATIENT
Start: 2023-03-03 | End: 2023-03-04

## 2023-03-03 RX ORDER — ONDANSETRON 2 MG/ML
4 INJECTION INTRAMUSCULAR; INTRAVENOUS ONCE
Status: COMPLETED | OUTPATIENT
Start: 2023-03-03 | End: 2023-03-04

## 2023-03-03 RX ORDER — SODIUM CHLORIDE 9 MG/ML
INJECTION, SOLUTION INTRAVENOUS CONTINUOUS
Status: DISCONTINUED | OUTPATIENT
Start: 2023-03-03 | End: 2023-03-06

## 2023-03-03 ASSESSMENT — ENCOUNTER SYMPTOMS
DIARRHEA: 0
ABDOMINAL PAIN: 0
COLOR CHANGE: 0
EYE REDNESS: 0
COUGH: 0
VOMITING: 0
SORE THROAT: 0
WHEEZING: 0
NAUSEA: 0
SHORTNESS OF BREATH: 0

## 2023-03-03 ASSESSMENT — PAIN SCALES - GENERAL: PAINLEVEL_OUTOF10: 10

## 2023-03-03 ASSESSMENT — PAIN - FUNCTIONAL ASSESSMENT: PAIN_FUNCTIONAL_ASSESSMENT: 0-10

## 2023-03-04 PROBLEM — E66.01 OBESITY, MORBID (HCC): Status: ACTIVE | Noted: 2017-11-28

## 2023-03-04 PROBLEM — K52.9 ENTERITIS: Status: ACTIVE | Noted: 2023-03-04

## 2023-03-04 PROBLEM — K56.609 BOWEL OBSTRUCTION (HCC): Status: ACTIVE | Noted: 2023-03-04

## 2023-03-04 PROBLEM — F17.200 NICOTINE USE DISORDER: Status: ACTIVE | Noted: 2021-01-19

## 2023-03-04 LAB
ALBUMIN SERPL-MCNC: 3.5 G/DL (ref 3.2–4.6)
ALBUMIN/GLOB SERPL: 0.9 (ref 0.4–1.6)
ALP SERPL-CCNC: 62 U/L (ref 50–136)
ALT SERPL-CCNC: 19 U/L (ref 12–65)
ANION GAP SERPL CALC-SCNC: 4 MMOL/L (ref 2–11)
APPEARANCE UR: CLEAR
AST SERPL-CCNC: 10 U/L (ref 15–37)
BACTERIA URNS QL MICRO: 0 /HPF
BILIRUB SERPL-MCNC: 0.2 MG/DL (ref 0.2–1.1)
BILIRUB UR QL: NEGATIVE
BUN SERPL-MCNC: 20 MG/DL (ref 8–23)
CALCIUM SERPL-MCNC: 9.6 MG/DL (ref 8.3–10.4)
CASTS URNS QL MICRO: NORMAL /LPF
CHLORIDE SERPL-SCNC: 104 MMOL/L (ref 101–110)
CO2 SERPL-SCNC: 27 MMOL/L (ref 21–32)
COLOR UR: ABNORMAL
CREAT SERPL-MCNC: 1 MG/DL (ref 0.6–1)
CRYSTALS URNS QL MICRO: 0 /LPF
EPI CELLS #/AREA URNS HPF: NORMAL /HPF
GLOBULIN SER CALC-MCNC: 3.7 G/DL (ref 2.8–4.5)
GLUCOSE SERPL-MCNC: 109 MG/DL (ref 65–100)
GLUCOSE UR STRIP.AUTO-MCNC: NEGATIVE MG/DL
HGB UR QL STRIP: ABNORMAL
KETONES UR QL STRIP.AUTO: NEGATIVE MG/DL
LEUKOCYTE ESTERASE UR QL STRIP.AUTO: NEGATIVE
LIPASE SERPL-CCNC: 129 U/L (ref 73–393)
MUCOUS THREADS URNS QL MICRO: 0 /LPF
NITRITE UR QL STRIP.AUTO: NEGATIVE
OTHER OBSERVATIONS: NORMAL
PH UR STRIP: 5.5 (ref 5–9)
POTASSIUM SERPL-SCNC: 4.3 MMOL/L (ref 3.5–5.1)
PROT SERPL-MCNC: 7.2 G/DL (ref 6.3–8.2)
PROT UR STRIP-MCNC: ABNORMAL MG/DL
RBC #/AREA URNS HPF: NORMAL /HPF
SODIUM SERPL-SCNC: 135 MMOL/L (ref 133–143)
SP GR UR REFRACTOMETRY: 1.01 (ref 1–1.02)
UROBILINOGEN UR QL STRIP.AUTO: 0.2 EU/DL (ref 0.2–1)
WBC URNS QL MICRO: NORMAL /HPF

## 2023-03-04 PROCEDURE — 2580000003 HC RX 258: Performed by: EMERGENCY MEDICINE

## 2023-03-04 PROCEDURE — 6360000002 HC RX W HCPCS: Performed by: EMERGENCY MEDICINE

## 2023-03-04 PROCEDURE — 6360000002 HC RX W HCPCS: Performed by: INTERNAL MEDICINE

## 2023-03-04 PROCEDURE — 81015 MICROSCOPIC EXAM OF URINE: CPT

## 2023-03-04 PROCEDURE — 96375 TX/PRO/DX INJ NEW DRUG ADDON: CPT

## 2023-03-04 PROCEDURE — 96374 THER/PROPH/DIAG INJ IV PUSH: CPT

## 2023-03-04 PROCEDURE — 96372 THER/PROPH/DIAG INJ SC/IM: CPT

## 2023-03-04 PROCEDURE — 81001 URINALYSIS AUTO W/SCOPE: CPT

## 2023-03-04 PROCEDURE — G0378 HOSPITAL OBSERVATION PER HR: HCPCS

## 2023-03-04 PROCEDURE — 96376 TX/PRO/DX INJ SAME DRUG ADON: CPT

## 2023-03-04 PROCEDURE — 6370000000 HC RX 637 (ALT 250 FOR IP): Performed by: INTERNAL MEDICINE

## 2023-03-04 PROCEDURE — 2580000003 HC RX 258: Performed by: INTERNAL MEDICINE

## 2023-03-04 RX ORDER — ONDANSETRON 2 MG/ML
4 INJECTION INTRAMUSCULAR; INTRAVENOUS EVERY 6 HOURS PRN
Status: DISCONTINUED | OUTPATIENT
Start: 2023-03-04 | End: 2023-03-07 | Stop reason: HOSPADM

## 2023-03-04 RX ORDER — SODIUM CHLORIDE 0.9 % (FLUSH) 0.9 %
5-40 SYRINGE (ML) INJECTION PRN
Status: DISCONTINUED | OUTPATIENT
Start: 2023-03-04 | End: 2023-03-07 | Stop reason: HOSPADM

## 2023-03-04 RX ORDER — CLOPIDOGREL BISULFATE 75 MG/1
75 TABLET ORAL DAILY
Status: DISCONTINUED | OUTPATIENT
Start: 2023-03-04 | End: 2023-03-07 | Stop reason: HOSPADM

## 2023-03-04 RX ORDER — ACETAMINOPHEN 325 MG/1
650 TABLET ORAL EVERY 6 HOURS PRN
Status: DISCONTINUED | OUTPATIENT
Start: 2023-03-04 | End: 2023-03-07 | Stop reason: HOSPADM

## 2023-03-04 RX ORDER — POLYETHYLENE GLYCOL 3350 17 G/17G
17 POWDER, FOR SOLUTION ORAL DAILY PRN
Status: DISCONTINUED | OUTPATIENT
Start: 2023-03-04 | End: 2023-03-07 | Stop reason: HOSPADM

## 2023-03-04 RX ORDER — SODIUM CHLORIDE, SODIUM LACTATE, POTASSIUM CHLORIDE, CALCIUM CHLORIDE 600; 310; 30; 20 MG/100ML; MG/100ML; MG/100ML; MG/100ML
INJECTION, SOLUTION INTRAVENOUS CONTINUOUS
Status: DISCONTINUED | OUTPATIENT
Start: 2023-03-04 | End: 2023-03-06

## 2023-03-04 RX ORDER — SODIUM CHLORIDE 9 MG/ML
INJECTION, SOLUTION INTRAVENOUS PRN
Status: DISCONTINUED | OUTPATIENT
Start: 2023-03-04 | End: 2023-03-07 | Stop reason: HOSPADM

## 2023-03-04 RX ORDER — ACETAMINOPHEN 650 MG/1
650 SUPPOSITORY RECTAL EVERY 6 HOURS PRN
Status: DISCONTINUED | OUTPATIENT
Start: 2023-03-04 | End: 2023-03-07 | Stop reason: HOSPADM

## 2023-03-04 RX ORDER — HYDROMORPHONE HYDROCHLORIDE 1 MG/ML
0.5 INJECTION, SOLUTION INTRAMUSCULAR; INTRAVENOUS; SUBCUTANEOUS
Status: COMPLETED | OUTPATIENT
Start: 2023-03-04 | End: 2023-03-04

## 2023-03-04 RX ORDER — ENOXAPARIN SODIUM 100 MG/ML
30 INJECTION SUBCUTANEOUS 2 TIMES DAILY
Status: DISCONTINUED | OUTPATIENT
Start: 2023-03-04 | End: 2023-03-05

## 2023-03-04 RX ORDER — HYDROCODONE BITARTRATE AND ACETAMINOPHEN 10; 325 MG/1; MG/1
1 TABLET ORAL 4 TIMES DAILY PRN
Status: DISCONTINUED | OUTPATIENT
Start: 2023-03-04 | End: 2023-03-07 | Stop reason: HOSPADM

## 2023-03-04 RX ORDER — CARVEDILOL 6.25 MG/1
6.25 TABLET ORAL 2 TIMES DAILY
Status: DISCONTINUED | OUTPATIENT
Start: 2023-03-04 | End: 2023-03-07

## 2023-03-04 RX ORDER — ROSUVASTATIN CALCIUM 10 MG/1
10 TABLET, COATED ORAL NIGHTLY
Status: DISCONTINUED | OUTPATIENT
Start: 2023-03-04 | End: 2023-03-07 | Stop reason: HOSPADM

## 2023-03-04 RX ORDER — FLUTICASONE PROPIONATE 50 MCG
2 SPRAY, SUSPENSION (ML) NASAL DAILY
Status: DISCONTINUED | OUTPATIENT
Start: 2023-03-04 | End: 2023-03-07 | Stop reason: HOSPADM

## 2023-03-04 RX ORDER — ASPIRIN 81 MG/1
81 TABLET, CHEWABLE ORAL DAILY
Status: DISCONTINUED | OUTPATIENT
Start: 2023-03-04 | End: 2023-03-07 | Stop reason: HOSPADM

## 2023-03-04 RX ORDER — LEVOTHYROXINE SODIUM 0.12 MG/1
125 TABLET ORAL DAILY
Status: DISCONTINUED | OUTPATIENT
Start: 2023-03-04 | End: 2023-03-07 | Stop reason: HOSPADM

## 2023-03-04 RX ORDER — SODIUM CHLORIDE 0.9 % (FLUSH) 0.9 %
5-40 SYRINGE (ML) INJECTION EVERY 12 HOURS SCHEDULED
Status: DISCONTINUED | OUTPATIENT
Start: 2023-03-04 | End: 2023-03-07 | Stop reason: HOSPADM

## 2023-03-04 RX ORDER — ONDANSETRON 4 MG/1
4 TABLET, ORALLY DISINTEGRATING ORAL EVERY 8 HOURS PRN
Status: DISCONTINUED | OUTPATIENT
Start: 2023-03-04 | End: 2023-03-07 | Stop reason: HOSPADM

## 2023-03-04 RX ADMIN — SODIUM CHLORIDE, POTASSIUM CHLORIDE, SODIUM LACTATE AND CALCIUM CHLORIDE: 600; 310; 30; 20 INJECTION, SOLUTION INTRAVENOUS at 10:37

## 2023-03-04 RX ADMIN — ENOXAPARIN SODIUM 30 MG: 100 INJECTION SUBCUTANEOUS at 20:57

## 2023-03-04 RX ADMIN — ONDANSETRON 4 MG: 2 INJECTION INTRAMUSCULAR; INTRAVENOUS at 00:06

## 2023-03-04 RX ADMIN — HYDROCODONE BITARTRATE AND ACETAMINOPHEN 1 TABLET: 10; 325 TABLET ORAL at 20:57

## 2023-03-04 RX ADMIN — CARVEDILOL 6.25 MG: 6.25 TABLET, FILM COATED ORAL at 20:57

## 2023-03-04 RX ADMIN — HYDROMORPHONE HYDROCHLORIDE 0.5 MG: 1 INJECTION, SOLUTION INTRAMUSCULAR; INTRAVENOUS; SUBCUTANEOUS at 05:31

## 2023-03-04 RX ADMIN — ROSUVASTATIN 10 MG: 10 TABLET, FILM COATED ORAL at 20:57

## 2023-03-04 RX ADMIN — CLOPIDOGREL BISULFATE 75 MG: 75 TABLET ORAL at 10:33

## 2023-03-04 RX ADMIN — SODIUM CHLORIDE: 9 INJECTION, SOLUTION INTRAVENOUS at 23:48

## 2023-03-04 RX ADMIN — SODIUM CHLORIDE, PRESERVATIVE FREE 10 ML: 5 INJECTION INTRAVENOUS at 10:37

## 2023-03-04 RX ADMIN — LEVOTHYROXINE SODIUM 125 MCG: 0.12 TABLET ORAL at 11:53

## 2023-03-04 RX ADMIN — SODIUM CHLORIDE: 9 INJECTION, SOLUTION INTRAVENOUS at 15:56

## 2023-03-04 RX ADMIN — HYDROMORPHONE HYDROCHLORIDE 0.5 MG: 1 INJECTION, SOLUTION INTRAMUSCULAR; INTRAVENOUS; SUBCUTANEOUS at 00:52

## 2023-03-04 RX ADMIN — ACETAMINOPHEN 650 MG: 325 TABLET ORAL at 14:15

## 2023-03-04 RX ADMIN — SODIUM CHLORIDE: 9 INJECTION, SOLUTION INTRAVENOUS at 00:08

## 2023-03-04 RX ADMIN — CARVEDILOL 6.25 MG: 6.25 TABLET, FILM COATED ORAL at 10:35

## 2023-03-04 RX ADMIN — ASPIRIN 81 MG 81 MG: 81 TABLET ORAL at 10:35

## 2023-03-04 RX ADMIN — ENOXAPARIN SODIUM 30 MG: 100 INJECTION SUBCUTANEOUS at 10:35

## 2023-03-04 RX ADMIN — MORPHINE SULFATE 4 MG: 4 INJECTION, SOLUTION INTRAMUSCULAR; INTRAVENOUS at 00:06

## 2023-03-04 ASSESSMENT — PAIN - FUNCTIONAL ASSESSMENT: PAIN_FUNCTIONAL_ASSESSMENT: ACTIVITIES ARE NOT PREVENTED

## 2023-03-04 ASSESSMENT — PAIN DESCRIPTION - PAIN TYPE: TYPE: CHRONIC PAIN

## 2023-03-04 ASSESSMENT — PAIN SCALES - GENERAL
PAINLEVEL_OUTOF10: 4
PAINLEVEL_OUTOF10: 0
PAINLEVEL_OUTOF10: 2
PAINLEVEL_OUTOF10: 10
PAINLEVEL_OUTOF10: 3
PAINLEVEL_OUTOF10: 5
PAINLEVEL_OUTOF10: 6

## 2023-03-04 ASSESSMENT — PAIN DESCRIPTION - ONSET: ONSET: ON-GOING

## 2023-03-04 ASSESSMENT — PAIN DESCRIPTION - FREQUENCY: FREQUENCY: INTERMITTENT

## 2023-03-04 ASSESSMENT — PAIN DESCRIPTION - DESCRIPTORS: DESCRIPTORS: ACHING;SORE

## 2023-03-04 ASSESSMENT — PAIN SCALES - WONG BAKER: WONGBAKER_NUMERICALRESPONSE: 0

## 2023-03-04 ASSESSMENT — PAIN DESCRIPTION - LOCATION
LOCATION: HEAD
LOCATION: BACK

## 2023-03-04 NOTE — ED NOTES
Pt appears in NAD at present, resting comfortably with eyes closed, resp even and unlabored with equal rise and fall of chest noted. Call light within reach. Vitals stable. Awaiting for admission orders at this time.       Noreen Guardado RN  03/04/23 [id number]

## 2023-03-04 NOTE — ED NOTES
TRANSFER - OUT REPORT:    Verbal report given to LAVELL Jenkins on [facility]  being transferred to [facility] for routine progression of patient care       Report consisted of patient's Situation, Background, Assessment and   Recommendations(SBAR). Information from the following report(s) Nurse Handoff Report was reviewed with the receiving nurse. [place] Assessment: Presents to emergency department  because of falls (Syncope, seizure, or loss of consciousness): No, Age > 79: No, Altered Mental Status, Intoxication with alcohol or substance confusion (Disorientation, impaired judgment, poor safety awaremess, or inability to follow instructions): No, Impaired Mobility: Ambulates or transfers with assistive devices or assistance; Unable to ambulate or transer.: No, Nursing Judgement: No  Lines:   Peripheral IV 03/03/23 Left Antecubital (Active)   Site Assessment Clean, dry & intact 03/03/23 2315   Line Status Blood return noted 03/03/23 2315   Phlebitis Assessment No symptoms 03/03/23 2315   Infiltration Assessment 0 03/03/23 2315        Opportunity for questions and clarification was provided.       Patient transported with:  Monitor           Trey Wallace RN  03/04/23 [id number]

## 2023-03-04 NOTE — ED NOTES
Pt report handed off to [place], [address] [place] [address]      Dang Lombardi, [address] [place] [address]  03/04/23 1942

## 2023-03-04 NOTE — H&P
Hospitalist History and Physical   Admit Date:  3/3/2023 11:11 PM   Name:  Eddi Allred   Age:  63 y.o. Sex:  female  :  1960   MRN:  [id number]   Room:  [place] [address]    Presenting Complaint: Abdominal pain for a couple of days  Reason(s) for Admission: No admission diagnoses are documented for this encounter. History of Present Illness:   Eddi Allred is a 63 y.o. female with medical history of   Hypertension  Hyperlipidemia  Obesity with BMI of 58  Previous abdominal surgery  Hypothyroidism  Smoking  who presented with abdominal pain for the past few days. This is associated with some nausea, no vomiting. Patient reports no diarrhea. No blood per rectum. No blood per urine. Patient has no fever. Her last meal was lunch the day prior of admission. Patient describes pain as started at the back in the middle, and then radiation to the right upper quadrant area of the abdomen. No lipase elevation. Patient came to emergency room. CT of the abdomen and pelvis shows distended fluid-filled mid small bowel loops without exact transition point. The distal small bowel is decompressed. Findings are suggestive of partial small bowel obstruction versus enteritis. There is evidence of cholecystectomy. There is a couple of 4 cm fat-containing hernias in the epigastric and supraumbilical regions. Patient is being admitted for possible small bowel obstruction versus enteritis. Assessment & Plan:     Principal Problem: Bowel obstruction ([address]    Enteritis  Plan:   Admit to medical floor. IV fluid   NPO for now. Empiric antibiotics. Symptomatic treatments   Monitor symptoms and likely can repeat imaging study soon. Other specified hypothyroidism  Plan:   Continue home medication. Nicotine use disorder  Plan:   I advised patient to quit smoking.   Patient voiced understanding      GERD (gastroesophageal reflux disease)  Plan:   Continue home medication      HTN (hypertension)  Plan:   Blood pressure is on the low side. Patient does not appear septic. Continue with IV fluid   Hold blood pressure medications. Oriana Verdugo HLD (hyperlipidemia)  Plan:   Continue home regimen      Obesity, morbid ([address]  Plan:   Patient will be counseled for weight loss and maintenance of healthy weight when recovering from this acute illness. Patient requires hospital stay as an in-patient and anticipated stay is more than 2 midnights due to the serious nature of the illness. Patient has no pain now. Will monitor. Further treatments will depend on initial responses and findings. I have discussed the plan of care with patient. PT/OT evals and PPD needed/ordered? Yes  Diet: No diet orders on file  VTE prophylaxis: SCD's   Code status: Prior    Hospital Problems:  Principal Problem: Bowel obstruction (HCC)  Active Problems:    Enteritis    Other specified hypothyroidism    Nicotine use disorder    GERD (gastroesophageal reflux disease)    HTN (hypertension)    HLD (hyperlipidemia)    Obesity, morbid ([address]  Resolved Problems:    * No resolved hospital problems.  *       Past History:     Past Medical History:   Diagnosis Date    Abnormal abdominal CT scan     Acne     Allergic rhinitis     Anxiety     Arm pain     Arthritis     Backache, unspecified     Biceps tendonitis     Calculus of kidney     Cervical strain     Chest pain     Chronic pain     arthritis in back and legs    Chronic right shoulder pain     Cigarette smoker     Cramps, muscle, general     Cyst of joint of shoulder     Depressive disorder, not elsewhere classified     Dyshidrosis     Dyspnea     Eczema, dyshidrotic     Encounter for long-term (current) use of other medications     Encounter for monitoring of chronic aspirin therapy     Generalized pain     GERD (gastroesophageal reflux disease)     Helicobacter pylori (H. pylori)     Hematuria     HLD (hyperlipidemia)     Low vitamin B12 level Microhematuria 2014    Myofascial pain     Neck pain     Night sweats     Obesity     JAMES (obstructive sleep apnea)     Osteopenia     Other B-complex deficiencies     Other specified disorders of shoulder joint     Palpitation     Postmenopausal disorder     Recurrent major depressive disorder, in partial remission (HCC)     Restless legs     Rosacea     Sinusitis     Spasm of colon     Sprain of neck     Tendonitis     Tobacco use disorder     Unspecified hypothyroidism     Unspecified menopausal and postmenopausal disorder        Past Surgical History:   Procedure Laterality Date    APPENDECTOMY      CARDIAC PROCEDURE N/A 2023    LEFT HEART CATH / CORONARY ANGIOGRAPHY performed by Sunday Cutler MD at [facility] CATH LAB    CARDIAC PROCEDURE N/A 2023    Percutaneous coronary intervention performed by Sunday Cutler MD at [facility]      x 2    CHOLECYSTECTOMY      COLOSTOMY      COLOSTOMY CLOSURE      HERNIA REPAIR  2010    [facility]- Dr. Stacie Pulliam      drainage of diverticular abscess    OTHER SURGICAL HISTORY      Laser Conization    TUBAL LIGATION Bilateral     with 2nd     US GUIDED CORE BREAST BIOPSY Bilateral 2012    Sterotactic, benign biopsy report--both showed dystropic calcifications, sclerosing adenosis and ductal hyperplasia        Social History     Tobacco Use    Smoking status: Every Day     Packs/day: 1.00     Types: Cigarettes    Smokeless tobacco: Never   Substance Use Topics    Alcohol use: No      Social History     Substance and Sexual Activity   Drug Use No       Family History   Problem Relation Age of Onset    Stroke Maternal Grandmother     Heart Disease Father     Cancer Brother         Pancreatic    Heart Attack Brother 62        with stent    Alcohol Abuse Brother     Hypertension Maternal Grandmother     Diabetes Maternal Grandmother     Heart Attack Sister 43        with stent    Stroke Sister Hypertension Sister     Thyroid Disease Sister     Cancer Sister         Pancreatic    Breast Cancer Neg Hx     Hypertension Mother     Heart Disease Mother         hole in heart; top part swollen    Thyroid Disease Mother     Diabetes Father     Heart Attack Father 61    Cancer Maternal Grandmother         lymph nodes        Immunization History   Administered Date(s) Administered    Influenza Virus Vaccine 08/22/2014    Influenza, FLUARIX, FLULAVAL, FLUZONE (age 10 mo+) AND AFLURIA, (age 1 y+), PF, 0.5mL 11/23/2016    Influenza, FLUCELVAX, (age 10 mo+), MDCK, PF, 0.5mL 11/28/2017    Influenza, High Dose (Fluzone 65 yrs and older) 11/20/2015    Pneumococcal Vaccine 05/22/2013    Tdap (Boostrix, Adacel) 05/22/2014     Allergies   Allergen Reactions    Citalopram Other (See Comments)     Headache    Gabapentin Other (See Comments)     Taking two would cause nightmares    Milnacipran Other (See Comments)     Headache    Pramipexole Nausea Only and Other (See Comments)     Abdominal pain    Varenicline Other (See Comments)     \"Couldn't take\"    Sulfa Antibiotics Nausea And Vomiting     Prior to Admit Medications:  Current Outpatient Medications   Medication Instructions    albuterol sulfate  (90 Base) MCG/ACT inhaler 2 puffs, Inhalation, EVERY 4 HOURS PRN    aspirin 81 mg, Oral, DAILY    carvedilol (COREG) 6.25 MG tablet TAKE 1 TABLET BY MOUTH TWICE A DAY    clopidogrel (PLAVIX) 75 mg, Oral, DAILY    ergocalciferol (ERGOCALCIFEROL) 1.25 MG (57651 UT) capsule ergocalciferol (vitamin D2) 1,250 mcg (50,000 unit) capsule   TAKE 1 CAPSULE ONCE A WEEK FOR VITAMIN D DEFICIENCY    fluticasone (FLONASE) 50 MCG/ACT nasal spray 2 sprays, Nasal, DAILY    HYDROcodone-acetaminophen (NORCO)  MG per tablet 2 tablets, Oral, 3 TIMES DAILY PRN    indapamide (LOZOL) 1.25 MG tablet TAKE 1 TABLET EVERY MORNING TO FLUID PILL/LOWER BLOOD PRESSURE    levothyroxine (SYNTHROID) 125 MCG tablet TAKE 1 TABLET BY MOUTH EVERY DAY losartan (COZAAR) 25 mg, Oral, DAILY    nitroGLYCERIN (NITROSTAT) 0.4 mg, SubLINGual, EVERY 5 MIN PRN    rosuvastatin (CRESTOR) 10 mg, Oral, Nightly         Objective:   Patient Vitals for the past 24 hrs:   Temp Pulse Resp BP SpO2   03/04/23 0647 -- -- -- -- 92 %   03/04/23 0600 -- -- -- (!) 95/59 94 %   03/04/23 0541 -- 54 18 -- 93 %   03/04/23 0515 -- -- -- -- 92 %   03/04/23 0504 -- -- -- (!) 109/56 93 %   03/04/23 0332 -- 60 -- -- 91 %   03/04/23 0308 -- -- -- (!) 112/58 95 %   03/04/23 0300 -- -- -- (!) 97/51 93 %   03/04/23 0052 -- -- -- -- 93 %   03/04/23 0010 -- -- -- (!) 128/55 93 %   03/03/23 2310 98.6 °F (37 °C) 63 18 (!) 167/95 97 %       Oxygen Therapy  SpO2: 92 %  O2 Device: None (Room air)    Estimated body mass index is 57.98 kg/m² as calculated from the following:    Height as of 2/16/23: 5' 2\" (1.575 m). Weight as of this encounter: 317 lb (143.8 kg). No intake or output data in the 24 hours ending 03/04/23 0810      Physical Exam:    Blood pressure (!) 95/59, pulse 54, temperature 98.6 °F (37 °C), temperature source Oral, resp. rate 18, weight (!) 317 lb (143.8 kg), SpO2 92 %. General:    Well nourished. BMI of 58. Patient is lying flat in bed. Patient appears tired and acutely ill. Not pale. Not icteric. Head:  Normocephalic, atraumatic  Eyes:  Sclerae appear normal.  Pupils equally round. ENT:  Nares appear normal.  Moist oral mucosa  Neck:  No restricted ROM. Trachea midline   CV:   RRR. No m/r/g. No jugular venous distension. Lungs:   CTAB. No wheezing, rhonchi, or rales. Symmetric expansion. Abdomen:   Soft, mildly at upper tender, no guarding. very distended due to truncal obesity. .  Extremities: No cyanosis or clubbing. No edema  Skin:     No rashes and normal coloration. Warm and dry. Neuro:  CN II-XII grossly intact. Sensation intact. Psych:  Normal mood and affect.       I have personally reviewed labs and tests:  Recent Labs:  Recent Results (from the past 24 hour(s))   CBC with Auto Differential    Collection Time: 03/03/23 11:15 PM   Result Value Ref Range    WBC 11.3 (H) 4.3 - 11.1 K/uL    RBC 4.54 4.05 - 5.2 M/uL    Hemoglobin 14.0 11.7 - 15.4 g/dL    Hematocrit 42.0 35.8 - 46.3 %    MCV 92.5 82 - 102 FL    MCH 30.8 26.1 - 32.9 PG    MCHC 33.3 31.4 - 35.0 g/dL    RDW 13.2 11.9 - 14.6 %    Platelets 725 151 - 732 K/uL    MPV 10.5 9.4 - 12.3 FL    nRBC 0.00 0.0 - 0.2 K/uL    Differential Type AUTOMATED      Seg Neutrophils 68 43 - 78 %    Lymphocytes 23 13 - 44 %    Monocytes 7 4.0 - 12.0 %    Eosinophils % 2 0.5 - 7.8 %    Basophils 0 0.0 - 2.0 %    Immature Granulocytes 0 0.0 - 5.0 %    Segs Absolute 7.6 1.7 - 8.2 K/UL    Absolute Lymph # 2.6 0.5 - 4.6 K/UL    Absolute Mono # 0.8 0.1 - 1.3 K/UL    Absolute Eos # 0.3 0.0 - 0.8 K/UL    Basophils Absolute 0.0 0.0 - 0.2 K/UL    Absolute Immature Granulocyte 0.0 0.0 - 0.5 K/UL   Comprehensive Metabolic Panel    Collection Time: 03/03/23 11:15 PM   Result Value Ref Range    Sodium 135 133 - 143 mmol/L    Potassium 4.3 3.5 - 5.1 mmol/L    Chloride 104 101 - 110 mmol/L    CO2 27 21 - 32 mmol/L    Anion Gap 4 2 - 11 mmol/L    Glucose 109 (H) 65 - 100 mg/dL    BUN 20 8 - 23 MG/DL    Creatinine 1.00 0.6 - 1.0 MG/DL    Est, Glom Filt Rate >60 >60 ml/min/1.73m2    Calcium 9.6 8.3 - 10.4 MG/DL    Total Bilirubin 0.2 0.2 - 1.1 MG/DL    ALT 19 12 - 65 U/L    AST 10 (L) 15 - 37 U/L    Alk Phosphatase 62 50 - 136 U/L    Total Protein 7.2 6.3 - 8.2 g/dL    Albumin 3.5 3.2 - 4.6 g/dL    Globulin 3.7 2.8 - 4.5 g/dL    Albumin/Globulin Ratio 0.9 0.4 - 1.6     Lipase    Collection Time: 03/03/23 11:15 PM   Result Value Ref Range    Lipase 129 73 - 393 U/L   Urinalysis    Collection Time: 03/04/23  4:56 AM   Result Value Ref Range    Color, UA YELLOW/STRAW      Appearance CLEAR      Specific Gravity, UA 1.013 1.001 - 1.023      pH, Urine 5.5 5.0 - 9.0      Protein, UA TRACE (A) NEG mg/dL    Glucose, UA Negative mg/dL    Ketones, Urine Negative NEG mg/dL    Bilirubin Urine Negative NEG      Blood, Urine SMALL (A) NEG      Urobilinogen, Urine 0.2 0.2 - 1.0 EU/dL    Nitrite, Urine Negative NEG      Leukocyte Esterase, Urine Negative NEG     Urinalysis, Micro    Collection Time: 03/04/23  4:56 AM   Result Value Ref Range    WBC, UA 0-3 0 /hpf    RBC, UA 0-3 0 /hpf    Epithelial Cells UA 3-5 0 /hpf    BACTERIA, URINE 0 0 /hpf    Casts HYALINE 0 /lpf    Crystals 0 0 /LPF    Mucus, UA 0 0 /lpf    Other observations RESULTS VERIFIED MANUALLY         I have personally reviewed imaging studies:  CT ABDOMEN PELVIS RENAL STONE    Result Date: 3/4/2023  EXAMINATION: CT ABDOMEN AND PELVIS WITHOUT IV CONTRAST   DATE OF EXAMINATION: March 03, 2023 INDICATION: Right flank and back pain COMPARISON: January 18, 2020. PROCEDURE:   Axial CT of the abdomen and pelvis was performed with sagittal and coronal reformatted images without contrast enhancement. The exam is limited because some types of pathology may not be adequately demonstrated due to lack of contrast enhancement. CT dose lowering techniques were used, to include: automated exposure control, adjustment for patient size, and or use of iterative  reconstruction. FINDINGS: LOWER CHEST :  Normal. ABDOMEN: Liver and Biliary system:  Normal. Gallbladder:  Normal. Spleen:  Normal. Pancreas:  Normal. Adrenal glands:  Normal. Kidneys and ureters:  Normal. No masses or inflammatory process. No urolithiasis. Aorta/IVC:   Aorta normal. No aortic aneurysm or dissection. IVC normal. Lymph nodes:  No significant lymphadenopathy. Stomach: Normal Bowel: There are distended fluid-filled mid small bowel loops without transition  point. No extraluminal air or mucosal thickening. Appendix: Not visualized. . Peritoneum/Mesentery:  Normal. Abdominal wall: There is a 4 to 5 cm fat-containing epigastric hernia there is an additional 4 cm supraumbilical fat-containing hernia. .  Very mild colonic diverticulosis.  PELVIS: Urinary bladder:  Normal. Reproductive organs:  Normal. Lymph Nodes:  Normal. BONES:  Unremarkable. ADDITIONAL  SIGNIFICANT FINDINGS:  None. 1. There are distended fluid-filled mid small bowel loops without exact transition point. The distal small bowel is decompressed. Findings are suggestive is of a partial small bowel obstruction versus enteritis, but the etiology is unclear. 2.  Cholecystectomy. 3.  There are a couple of 4 cm fat-containing hernias in the epigastric and supraumbilical regions. Shari Ortiz M.D. 3/4/2023 12:20:00 AM      Echocardiogram:  02/07/23    TRANSTHORACIC ECHOCARDIOGRAM (TTE) COMPLETE (CONTRAST/BUBBLE/3D PRN) 02/08/2023 11:21 AM, 02/08/2023 12:00 AM (Final)    Interpretation Summary    Left Ventricle: Normal left ventricular systolic function. EF by 2D Simpsons Biplane is 60%. Left ventricle size is normal. Increased wall thickness. Normal wall motion. Normal diastolic function. Mitral Valve: Mildly thickened leaflet, at the posterior leaflet. Aorta: Ao root diameter is 3.1 cm. Ao Root Index is 1.57 cm/m2. Technical qualifiers: Color flow Doppler was performed and pulse wave and/or continuous wave Doppler was performed. Signed by: Risa Jones MD on 2/8/2023 11:21 AM, Signed by: Unknown Provider Result on 2/8/2023 12:00 AM        Orders Placed This Encounter   Medications    morphine injection 4 mg    ondansetron (ZOFRAN) injection 4 mg    0.9 % sodium chloride infusion    HYDROmorphone HCl PF (DILAUDID) injection 0.5 mg    HYDROmorphone HCl PF (DILAUDID) injection 0.5 mg         Signed:  Patricia Duke MD    Part of this note may have been written by using a voice dictation software. The note has been proof read but may still contain some grammatical/other typographical errors.

## 2023-03-04 NOTE — ED PROVIDER NOTES
Emergency Department Provider Note                   PCP:                Kathrine Lay MD               Age: 63 y.o. Sex: female     [address] [facility]    1. Partial small bowel obstruction ([facility] [address]  [id number]           MEDICAL DECISION MAKING  Please see the ED course note for additional medical decision making charting. We will get a CT scan to evaluate for possible kidney stone or other intra-abdominal abnormality. I will treat her pain with some IV morphine, IV Zofran, and IV fluids. Complexity of Problems Addressed:  1 or more acute illnesses that pose a threat to life or bodily function. Data Reviewed and Analyzed:  Category 1:   I reviewed records from an external source: Recent discharge summary and cardiology notes. I ordered each unique test.  I reviewed the results of each unique test.    The patients assessment required an independent historian: Additional history from the daughter to help provide timing and context that the patient did not remember. Category 2:   I independently ordered and reviewed the CT scan. Category 3: Discussion of management or test interpretation. We will discuss the case with the hospitalist for admission. ED Course as of 03/04/23 0033   Mimi Gladys Mar 03, 2023   2331 I independently interpreted the CT scan. I do not see any obvious appendicitis or significant hydroureteronephrosis. [AC]   Sat Mar 04, 2023   0027 Her CT scan is concerning for partial small bowel obstruction or significant enteritis. Her blood work is unremarkable. She continues to have some pain despite the morphine. She has not had any significant vomiting. I will plan to discuss the case with the hospitalist for admission. [AC]   269 60-year-old lady that I think needs admission for a possible partial small bowel obstruction versus severe enteritis. She began having some right-sided abdomen/flank pain that is now progressed to her lower abdomen.   She has had a previous cholecystectomy and partial colectomy. She had fairly severe diverticulitis resulting in the partial colectomy with a colostomy for 1 year. She had that reversed and now has no colostomy. With this pain she has had some nausea but no significant vomiting. Therefore I have not placed an NG tube. CT scan is concerning for either a partial small bowel obstruction or perhaps just a severe enteritis and I think she would benefit from an observation admission to see if her symptoms resolved. [AC]      ED Course User Index  [AC] Bandar Torres MD       Risk of Complications and/or Morbidity of Patient Management:  Parental controlled substances given in the ED     Yisel Taylor is a 63 y.o. female who presents to the Emergency Department with chief complaint of  No chief complaint on file. 80-year-old lady presents with concerns about pain in her right flank that radiates towards her groin. She says she has no history of kidney stones. With this pain she had some mild nausea but no vomiting or diarrhea. She has already had her gallbladder taken out. She denies any fevers or chills. She had no blood in her urine or bowels. Of note she did have an NSTEMI about a month ago and got a stent placed. No other associated symptoms. Elements of this note were created using speech recognition software. As such, errors of speech recognition may be present. Review of Systems   Constitutional:  Negative for activity change, chills and fever. HENT:  Negative for congestion and sore throat. Eyes:  Negative for redness and visual disturbance. Respiratory:  Negative for cough, shortness of breath and wheezing. Cardiovascular:  Negative for chest pain and palpitations. Gastrointestinal:  Negative for abdominal pain, diarrhea, nausea and vomiting. Endocrine: Negative for polydipsia and polyuria. Genitourinary:  Positive for flank pain. Negative for hematuria.    Musculoskeletal:  Negative for joint swelling and myalgias. Skin:  Negative for color change and rash. Allergic/Immunologic: Negative for immunocompromised state. Neurological:  Negative for dizziness, light-headedness and headaches. Hematological:  Negative for adenopathy. Psychiatric/Behavioral:  Negative for confusion. Vitals signs and nursing note reviewed. Patient Vitals for the past 4 hrs:   Temp Pulse Resp BP SpO2   03/03/23 2310 98.6 °F (37 °C) 63 18 (!) 167/95 97 %          Physical Exam  Vitals and nursing note reviewed. Constitutional:       Appearance: Normal appearance. Eyes:      General:         Right eye: No discharge. Left eye: No discharge. Cardiovascular:      Rate and Rhythm: Normal rate and regular rhythm. Pulses: Normal pulses. Pulmonary:      Effort: Pulmonary effort is normal.      Breath sounds: Normal breath sounds. No wheezing. Abdominal:      General: Bowel sounds are normal.      Tenderness: There is no abdominal tenderness. There is no guarding or rebound. Musculoskeletal:      Right lower leg: No edema. Left lower leg: No edema. Lymphadenopathy:      Cervical: No cervical adenopathy. Skin:     Coloration: Skin is not jaundiced. Neurological:      General: No focal deficit present. Mental Status: She is alert and oriented to person, place, and time. Mental status is at baseline.         Procedures     Orders Placed This Encounter   Procedures    CT ABDOMEN PELVIS RENAL STONE    CBC with Auto Differential    Comprehensive Metabolic Panel    Lipase    Urinalysis    Saline lock IV        Medications   0.9 % sodium chloride infusion ( IntraVENous New Bag 3/4/23 0008)   HYDROmorphone HCl PF (DILAUDID) injection 0.5 mg (has no administration in time range)   morphine injection 4 mg (4 mg IntraVENous Given 3/4/23 0006)   ondansetron (ZOFRAN) injection 4 mg (4 mg IntraVENous Given 3/4/23 0006)       New Prescriptions    No medications on file        Past Medical History:   Diagnosis Date    Abnormal abdominal CT scan     Acne     Allergic rhinitis     Anxiety     Arm pain     Arthritis     Backache, unspecified     Biceps tendonitis     Calculus of kidney     Cervical strain     Chest pain     Chronic pain     arthritis in back and legs    Chronic right shoulder pain     Cigarette smoker     Cramps, muscle, general     Cyst of joint of shoulder     Depressive disorder, not elsewhere classified     Dyshidrosis     Dyspnea     Eczema, dyshidrotic     Encounter for long-term (current) use of other medications     Encounter for monitoring of chronic aspirin therapy     Generalized pain     GERD (gastroesophageal reflux disease)     Helicobacter pylori (H. pylori)     Hematuria     HLD (hyperlipidemia)     Low vitamin B12 level     Microhematuria 5/22/2014    Myofascial pain     Neck pain     Night sweats     Obesity     JAMES (obstructive sleep apnea)     Osteopenia     Other B-complex deficiencies     Other specified disorders of shoulder joint     Palpitation     Postmenopausal disorder     Recurrent major depressive disorder, in partial remission (HCC)     Restless legs     Rosacea     Sinusitis     Spasm of colon     Sprain of neck     Tendonitis     Tobacco use disorder     Unspecified hypothyroidism     Unspecified menopausal and postmenopausal disorder         Past Surgical History:   Procedure Laterality Date    APPENDECTOMY      CARDIAC PROCEDURE N/A 2/8/2023    LEFT HEART CATH / CORONARY ANGIOGRAPHY performed by Segundo Millan MD at [address] [facility] CATH LAB    CARDIAC PROCEDURE N/A 2/8/2023    Percutaneous coronary intervention performed by Segundo Millan MD at [address] [facility]      x 2    CHOLECYSTECTOMY      COLOSTOMY      COLOSTOMY CLOSURE  2009    Ruslan Baez  5/2010    [facility]- Dr. Nolan Singer      drainage of diverticular abscess    OTHER SURGICAL HISTORY  1996    Laser Conization    TUBAL LIGATION Bilateral     with 2nd     US GUIDED CORE BREAST BIOPSY Bilateral 2012    Sterotactic, benign biopsy report--both showed dystropic calcifications, sclerosing adenosis and ductal hyperplasia        Family History   Problem Relation Age of Onset    Stroke Maternal Grandmother     Heart Disease Father     Cancer Brother         Pancreatic    Heart Attack Brother 62        with stent    Alcohol Abuse Brother     Hypertension Maternal Grandmother     Diabetes Maternal Grandmother     Heart Attack Sister 43        with stent    Stroke Sister     Hypertension Sister     Thyroid Disease Sister     Cancer Sister         Pancreatic    Breast Cancer Neg Hx     Hypertension Mother     Heart Disease Mother         hole in heart; top part swollen    Thyroid Disease Mother     Diabetes Father     Heart Attack Father 61    Cancer Maternal Grandmother         lymph nodes        Social History     Socioeconomic History    Marital status:    Tobacco Use    Smoking status: Every Day     Packs/day: 1.00     Types: Cigarettes    Smokeless tobacco: Never   Substance and Sexual Activity    Alcohol use: No    Drug use: No        Allergies: Citalopram, Gabapentin, Milnacipran, Pramipexole, Varenicline, and Sulfa antibiotics    Previous Medications    ALBUTEROL SULFATE  (90 BASE) MCG/ACT INHALER    Inhale 2 puffs into the lungs every 4 hours as needed    ASPIRIN 81 MG CHEWABLE TABLET    Take 81 mg by mouth daily    CARVEDILOL (COREG) 6.25 MG TABLET    TAKE 1 TABLET BY MOUTH TWICE A DAY    CLOPIDOGREL (PLAVIX) 75 MG TABLET    Take 1 tablet by mouth daily    ERGOCALCIFEROL (ERGOCALCIFEROL) 1.25 MG (64125 UT) CAPSULE    ergocalciferol (vitamin D2) 1,250 mcg (50,000 unit) capsule   TAKE 1 CAPSULE ONCE A WEEK FOR VITAMIN D DEFICIENCY    FLUTICASONE (FLONASE) 50 MCG/ACT NASAL SPRAY    2 sprays by Nasal route daily    HYDROCODONE-ACETAMINOPHEN (NORCO)  MG PER TABLET    Take 2 tablets by mouth 3 times daily as needed.     INDAPAMIDE (LOZOL) 1.25 MG TABLET    TAKE 1 TABLET EVERY MORNING TO FLUID PILL/LOWER BLOOD PRESSURE    LEVOTHYROXINE (SYNTHROID) 125 MCG TABLET    TAKE 1 TABLET BY MOUTH EVERY DAY    LOSARTAN (COZAAR) 25 MG TABLET    Take 1 tablet by mouth daily    NITROGLYCERIN (NITROSTAT) 0.4 MG SL TABLET    Place 1 tablet under the tongue every 5 minutes as needed for Chest pain    ROSUVASTATIN (CRESTOR) 10 MG TABLET    Take 10 mg by mouth at bedtime        Results for orders placed or performed during the hospital encounter of 03/03/23   CT ABDOMEN PELVIS RENAL STONE    Narrative    EXAMINATION: CT ABDOMEN AND PELVIS WITHOUT IV CONTRAST       DATE OF EXAMINATION: March 03, 2023    INDICATION: Right flank and back pain    COMPARISON: January 18, 2020. PROCEDURE:   Axial CT of the abdomen and pelvis was performed with sagittal and   coronal reformatted images without contrast enhancement. The exam is limited   because some types of pathology may not be adequately demonstrated due to lack   of contrast enhancement. CT dose lowering techniques were used, to include:   automated exposure control, adjustment for patient size, and or use of iterative   reconstruction. FINDINGS:    LOWER CHEST :  Normal.    ABDOMEN:    Liver and Biliary system:  Normal.    Gallbladder:  Normal.    Spleen:  Normal.    Pancreas:  Normal.    Adrenal glands:  Normal.    Kidneys and ureters:  Normal. No masses or inflammatory process. No   urolithiasis. Aorta/IVC:   Aorta normal. No aortic aneurysm or dissection. IVC normal.    Lymph nodes:  No significant lymphadenopathy. Stomach: Normal    Bowel: There are distended fluid-filled mid small bowel loops without transition   point. No extraluminal air or mucosal thickening. Appendix: Not visualized. .    Peritoneum/Mesentery:  Normal.    Abdominal wall: There is a 4 to 5 cm fat-containing epigastric hernia there is   an additional 4 cm supraumbilical fat-containing hernia. .  Very mild colonic diverticulosis. PELVIS:  Urinary bladder:  Normal.    Reproductive organs:  Normal.    Lymph Nodes:  Normal.    BONES:  Unremarkable. ADDITIONAL  SIGNIFICANT FINDINGS:  None. Impression    1. There are distended fluid-filled mid small bowel loops without exact   transition point. The distal small bowel is decompressed. Findings are   suggestive is of a partial small bowel obstruction versus enteritis, but the   etiology is unclear. 2.  Cholecystectomy. 3.  There are a couple of 4 cm fat-containing hernias in the epigastric and   supraumbilical regions.      Shannan Milner M.D.   3/4/2023 12:20:00 AM   CBC with Auto Differential   Result Value Ref Range    WBC 11.3 (H) 4.3 - 11.1 K/uL    RBC 4.54 4.05 - 5.2 M/uL    Hemoglobin 14.0 11.7 - 15.4 g/dL    Hematocrit 42.0 35.8 - 46.3 %    MCV 92.5 82 - 102 FL    MCH 30.8 26.1 - 32.9 PG    MCHC 33.3 31.4 - 35.0 g/dL    RDW 13.2 11.9 - 14.6 %    Platelets 399 054 - 800 K/uL    MPV 10.5 9.4 - 12.3 FL    nRBC 0.00 0.0 - 0.2 K/uL    Differential Type AUTOMATED      Seg Neutrophils 68 43 - 78 %    Lymphocytes 23 13 - 44 %    Monocytes 7 4.0 - 12.0 %    Eosinophils % 2 0.5 - 7.8 %    Basophils 0 0.0 - 2.0 %    Immature Granulocytes 0 0.0 - 5.0 %    Segs Absolute 7.6 1.7 - 8.2 K/UL    Absolute Lymph # 2.6 0.5 - 4.6 K/UL    Absolute Mono # 0.8 0.1 - 1.3 K/UL    Absolute Eos # 0.3 0.0 - 0.8 K/UL    Basophils Absolute 0.0 0.0 - 0.2 K/UL    Absolute Immature Granulocyte 0.0 0.0 - 0.5 K/UL   Comprehensive Metabolic Panel   Result Value Ref Range    Sodium 135 133 - 143 mmol/L    Potassium 4.3 3.5 - 5.1 mmol/L    Chloride 104 101 - 110 mmol/L    CO2 27 21 - 32 mmol/L    Anion Gap 4 2 - 11 mmol/L    Glucose 109 (H) 65 - 100 mg/dL    BUN 20 8 - 23 MG/DL    Creatinine 1.00 0.6 - 1.0 MG/DL    Est, Glom Filt Rate >60 >60 ml/min/1.73m2    Calcium 9.6 8.3 - 10.4 MG/DL    Total Bilirubin 0.2 0.2 - 1.1 MG/DL    ALT 19 12 - 65 U/L    AST 10 (L) 15 - 37 U/L    Alk Phosphatase 62 50 - 136 U/L    Total Protein 7.2 6.3 - 8.2 g/dL    Albumin 3.5 3.2 - 4.6 g/dL    Globulin 3.7 2.8 - 4.5 g/dL    Albumin/Globulin Ratio 0.9 0.4 - 1.6     Lipase   Result Value Ref Range    Lipase 129 73 - 393 U/L        CT ABDOMEN PELVIS RENAL STONE   Final Result   1. There are distended fluid-filled mid small bowel loops without exact    transition point. The distal small bowel is decompressed. Findings are    suggestive is of a partial small bowel obstruction versus enteritis, but the    etiology is unclear. 2.  Cholecystectomy. 3.  There are a couple of 4 cm fat-containing hernias in the epigastric and    supraumbilical regions. Kristal Jimenez M.D.    3/4/2023 12:20:00 AM                        Voice dictation software was used during the making of this note. This software is not perfect and grammatical and other typographical errors may be present. This note has not been completely proofread for errors.         Sol Caputo MD  03/04/23 [id number]

## 2023-03-04 NOTE — ACP (ADVANCE CARE PLANNING)
[facility] Hospitalist Service  At the heart of better care     Advance Care Planning   Admit Date:  3/3/2023 11:11 PM   Name:  Patricio Cox   Age:  63 y.o. Sex:  female  :  1960   MRN:  [id number]   Room:  Veronica is able to make her own decisions:   Yes     If pt unable to make decisions, POA/surrogate decision maker:  Son, Jamee Nearing     Other people present: This physician     Patient / surrogate decision-maker directed code status:  Full code     Other ACP topics discussed, if applicable:   Treatment of bowel obstruction, enteritis     Patient or surrogate consented to discussion of the current conditions, workup, management plans, prognosis, and the risk for further deterioration. Time spent: 19 minutes in direct discussion.       Signed:  Mateo Valverde MD

## 2023-03-04 NOTE — ED TRIAGE NOTES
Pt c/o of back pain that radiates to lower stomach. Has hx of stent placed x1 month ago.  Pt took ibuprofen 800mg before coming to ED

## 2023-03-04 NOTE — PROGRESS NOTES
TRANSFER - IN REPORT:    Verbal report received from Obdulia on [address] [facility]  being received from ED for routine progression of patient care      Report consisted of patient's Situation, Background, Assessment and   Recommendations(SBAR). Information from the following report(s) Index, ED Encounter Summary, Intake/Output, MAR, Recent Results, and Neuro Assessment was reviewed with the receiving nurse. Opportunity for questions and clarification was provided. Assessment completed upon patient's arrival to unit and care assumed.

## 2023-03-04 NOTE — ED NOTES
Pt ambulatory to bathroom with stable and steady gait noted. Pt requesting pain medication upon return to room. Provider made aware. Still awaiting admission orders at this time.       Mulu Tyson RN  03/04/23 [id number]

## 2023-03-05 ENCOUNTER — APPOINTMENT (OUTPATIENT)
Dept: GENERAL RADIOLOGY | Age: 63
DRG: 389 | End: 2023-03-05
Payer: COMMERCIAL

## 2023-03-05 LAB
ANION GAP SERPL CALC-SCNC: 2 MMOL/L (ref 2–11)
BASOPHILS # BLD: 0 K/UL (ref 0–0.2)
BASOPHILS NFR BLD: 0 % (ref 0–2)
BUN SERPL-MCNC: 12 MG/DL (ref 8–23)
CALCIUM SERPL-MCNC: 8.5 MG/DL (ref 8.3–10.4)
CHLORIDE SERPL-SCNC: 113 MMOL/L (ref 101–110)
CO2 SERPL-SCNC: 25 MMOL/L (ref 21–32)
CREAT SERPL-MCNC: 0.7 MG/DL (ref 0.6–1)
DIFFERENTIAL METHOD BLD: NORMAL
EOSINOPHIL # BLD: 0.2 K/UL (ref 0–0.8)
EOSINOPHIL NFR BLD: 3 % (ref 0.5–7.8)
ERYTHROCYTE [DISTWIDTH] IN BLOOD BY AUTOMATED COUNT: 13.4 % (ref 11.9–14.6)
GLUCOSE SERPL-MCNC: 93 MG/DL (ref 65–100)
HCT VFR BLD AUTO: 37.9 % (ref 35.8–46.3)
HGB BLD-MCNC: 12.3 G/DL (ref 11.7–15.4)
IMM GRANULOCYTES # BLD AUTO: 0 K/UL (ref 0–0.5)
IMM GRANULOCYTES NFR BLD AUTO: 0 % (ref 0–5)
LYMPHOCYTES # BLD: 2.3 K/UL (ref 0.5–4.6)
LYMPHOCYTES NFR BLD: 35 % (ref 13–44)
MCH RBC QN AUTO: 30.4 PG (ref 26.1–32.9)
MCHC RBC AUTO-ENTMCNC: 32.5 G/DL (ref 31.4–35)
MCV RBC AUTO: 93.6 FL (ref 82–102)
MONOCYTES # BLD: 0.5 K/UL (ref 0.1–1.3)
MONOCYTES NFR BLD: 7 % (ref 4–12)
NEUTS SEG # BLD: 3.4 K/UL (ref 1.7–8.2)
NEUTS SEG NFR BLD: 55 % (ref 43–78)
NRBC # BLD: 0 K/UL (ref 0–0.2)
PLATELET # BLD AUTO: 216 K/UL (ref 150–450)
PMV BLD AUTO: 10.4 FL (ref 9.4–12.3)
POTASSIUM SERPL-SCNC: 3.9 MMOL/L (ref 3.5–5.1)
RBC # BLD AUTO: 4.05 M/UL (ref 4.05–5.2)
SODIUM SERPL-SCNC: 140 MMOL/L (ref 133–143)
WBC # BLD AUTO: 6.4 K/UL (ref 4.3–11.1)

## 2023-03-05 PROCEDURE — 6370000000 HC RX 637 (ALT 250 FOR IP): Performed by: INTERNAL MEDICINE

## 2023-03-05 PROCEDURE — 85025 COMPLETE CBC W/AUTO DIFF WBC: CPT

## 2023-03-05 PROCEDURE — 6360000002 HC RX W HCPCS: Performed by: STUDENT IN AN ORGANIZED HEALTH CARE EDUCATION/TRAINING PROGRAM

## 2023-03-05 PROCEDURE — 6360000002 HC RX W HCPCS: Performed by: INTERNAL MEDICINE

## 2023-03-05 PROCEDURE — 96372 THER/PROPH/DIAG INJ SC/IM: CPT

## 2023-03-05 PROCEDURE — 36415 COLL VENOUS BLD VENIPUNCTURE: CPT

## 2023-03-05 PROCEDURE — G0378 HOSPITAL OBSERVATION PER HR: HCPCS

## 2023-03-05 PROCEDURE — 2580000003 HC RX 258: Performed by: INTERNAL MEDICINE

## 2023-03-05 PROCEDURE — 80048 BASIC METABOLIC PNL TOTAL CA: CPT

## 2023-03-05 PROCEDURE — 74018 RADEX ABDOMEN 1 VIEW: CPT

## 2023-03-05 RX ORDER — ENOXAPARIN SODIUM 100 MG/ML
30 INJECTION SUBCUTANEOUS 2 TIMES DAILY
Status: COMPLETED | OUTPATIENT
Start: 2023-03-05 | End: 2023-03-05

## 2023-03-05 RX ORDER — ENOXAPARIN SODIUM 100 MG/ML
40 INJECTION SUBCUTANEOUS DAILY
Status: DISCONTINUED | OUTPATIENT
Start: 2023-03-06 | End: 2023-03-07 | Stop reason: HOSPADM

## 2023-03-05 RX ADMIN — SODIUM CHLORIDE, PRESERVATIVE FREE 10 ML: 5 INJECTION INTRAVENOUS at 20:27

## 2023-03-05 RX ADMIN — HYDROCODONE BITARTRATE AND ACETAMINOPHEN 1 TABLET: 10; 325 TABLET ORAL at 14:59

## 2023-03-05 RX ADMIN — ENOXAPARIN SODIUM 30 MG: 100 INJECTION SUBCUTANEOUS at 20:28

## 2023-03-05 RX ADMIN — LEVOTHYROXINE SODIUM 125 MCG: 0.12 TABLET ORAL at 08:35

## 2023-03-05 RX ADMIN — ENOXAPARIN SODIUM 30 MG: 100 INJECTION SUBCUTANEOUS at 08:28

## 2023-03-05 RX ADMIN — CARVEDILOL 6.25 MG: 6.25 TABLET, FILM COATED ORAL at 08:29

## 2023-03-05 RX ADMIN — HYDROCODONE BITARTRATE AND ACETAMINOPHEN 1 TABLET: 10; 325 TABLET ORAL at 04:22

## 2023-03-05 RX ADMIN — ASPIRIN 81 MG 81 MG: 81 TABLET ORAL at 08:29

## 2023-03-05 RX ADMIN — CARVEDILOL 6.25 MG: 6.25 TABLET, FILM COATED ORAL at 20:24

## 2023-03-05 RX ADMIN — ROSUVASTATIN 10 MG: 10 TABLET, FILM COATED ORAL at 20:24

## 2023-03-05 RX ADMIN — SODIUM CHLORIDE, PRESERVATIVE FREE 10 ML: 5 INJECTION INTRAVENOUS at 08:29

## 2023-03-05 RX ADMIN — CLOPIDOGREL BISULFATE 75 MG: 75 TABLET ORAL at 08:29

## 2023-03-05 ASSESSMENT — PAIN SCALES - GENERAL
PAINLEVEL_OUTOF10: 7
PAINLEVEL_OUTOF10: 0
PAINLEVEL_OUTOF10: 0
PAINLEVEL_OUTOF10: 8

## 2023-03-05 ASSESSMENT — PAIN DESCRIPTION - PAIN TYPE: TYPE: CHRONIC PAIN

## 2023-03-05 ASSESSMENT — PAIN DESCRIPTION - FREQUENCY: FREQUENCY: CONTINUOUS

## 2023-03-05 ASSESSMENT — PAIN DESCRIPTION - ORIENTATION: ORIENTATION: LOWER

## 2023-03-05 ASSESSMENT — PAIN DESCRIPTION - LOCATION
LOCATION: BACK
LOCATION: BACK

## 2023-03-05 ASSESSMENT — PAIN DESCRIPTION - DESCRIPTORS
DESCRIPTORS: ACHING
DESCRIPTORS: ACHING

## 2023-03-05 ASSESSMENT — PAIN - FUNCTIONAL ASSESSMENT: PAIN_FUNCTIONAL_ASSESSMENT: ACTIVITIES ARE NOT PREVENTED

## 2023-03-05 NOTE — PLAN OF CARE
Problem: Discharge Planning  Goal: Discharge to home or other facility with appropriate resources  3/4/2023 2356 by Osei Azul RN  Outcome: Progressing  3/4/2023 1554 by Alice Mcdowell RN  Outcome: Progressing     Problem: Pain  Goal: Verbalizes/displays adequate comfort level or baseline comfort level  3/4/2023 2356 by Osei Azul RN  Outcome: Progressing  3/4/2023 1554 by Alice Mcdowell RN  Outcome: Progressing     Problem: Safety - Adult  Goal: Free from fall injury  3/4/2023 2356 by Osei Azul RN  Outcome: Progressing  3/4/2023 1554 by Alice Mcdowell RN  Outcome: Progressing

## 2023-03-05 NOTE — PROGRESS NOTES
Hospitalist Progress Note   Admit Date:  3/3/2023 11:11 PM   Name:  Eddi Allred   Age:  63 y.o. Sex:  female  :  1960   MRN:  [id number]   Room:  [facility]/    Presenting Complaint: No chief complaint on file. Reason(s) for Admission: Bowel obstruction ([facility] [K56.609]  Partial small bowel obstruction [facility]) [K56.600]     Hospital Course:   Eddi Allred is a 63 y.o. female with medical history of hypertension, hyperlipidemia, hypothyroidism who presented with abdominal pain over the last few days. CT of abdomen pelvis showed distended fluid-filled mid small bowel loops without exact transition point. Distal small bowel decompressed. Findings consistent with partial small bowel obstruction versus enteritis. Subjective & 24hr Events (23): She was seen and examined at the bedside. Patient states he is feeling much better today without abdominal pain. Asking to eat. Patient denies cardiac chest pain, shortness of breath, fever/chills. Assessment & Plan: Bowel obstruction/enteritis  - Admit to medical floor  - IV fluids  - N.p.o. on admission, advance to clear liquid diet  - Empiric antibiotics  - KUB without evidence of small bowel obstruction    Hypothyroidism  - Continue home medication    Nicotine use disorder  - Cessation on smoking discussed    GERD  - Continue home PPI    Hypertension  - Patient not septic  - Continue IV fluids  - Blood pressure medications on hold secondary to lower than normal blood pressure    Hyperlipidemia  - Continue home medications    Morbid obesity  - Counseled on weight loss and maintenance of healthy weight    PT/OT evals and PPD needed/ordered? Yes  Diet:  ADULT DIET; Clear Liquid  VTE prophylaxis: SCD's   Code status: Full Code    Hospital Problems:  Principal Problem:     Bowel obstruction (HCC)  Active Problems:    Enteritis    Other specified hypothyroidism    Nicotine use disorder    GERD (gastroesophageal reflux disease)    HTN (hypertension)    HLD (hyperlipidemia)    Obesity, morbid ([address]  Resolved Problems:    * No resolved hospital problems. *      Objective:   Patient Vitals for the past 24 hrs:   Temp Pulse Resp BP SpO2   03/05/23 1134 97.7 °F (36.5 °C) 51 18 (!) 142/68 95 %   03/05/23 0745 98.4 °F (36.9 °C) 54 18 (!) 130/49 94 %   03/05/23 0452 -- -- 17 -- --   03/05/23 0406 98.4 °F (36.9 °C) 62 18 (!) 145/67 92 %   03/05/23 0034 98.2 °F (36.8 °C) 58 18 (!) 127/57 90 %   03/04/23 2127 -- -- 17 -- --   03/04/23 2000 98.4 °F (36.9 °C) 57 18 138/62 93 %   03/04/23 1546 98.2 °F (36.8 °C) 55 18 (!) 122/58 95 %       Oxygen Therapy  SpO2: 95 %  O2 Device: None (Room air)    Estimated body mass index is 39.84 kg/m² as calculated from the following:    Height as of this encounter: 5' 2\" (1.575 m). Weight as of this encounter: 217 lb 12.8 oz (98.8 kg). Intake/Output Summary (Last 24 hours) at 3/5/2023 1455  Last data filed at 3/5/2023 0424  Gross per 24 hour   Intake 1778.33 ml   Output --   Net 1778.33 ml         Physical Exam:   Blood pressure (!) 142/68, pulse 51, temperature 97.7 °F (36.5 °C), resp. rate 18, height 5' 2\" (1.575 m), weight 217 lb 12.8 oz (98.8 kg), SpO2 95 %. General:    Well nourished. Head:  Normocephalic, atraumatic  Eyes:  Sclerae appear normal.  Pupils equally round. ENT:  Nares appear normal.  Moist oral mucosa  Neck:  No restricted ROM. Trachea midline   CV:   RRR. No m/r/g. No jugular venous distension. Lungs:   CTAB. No wheezing, rhonchi, or rales. Symmetric expansion. Abdomen:   Soft, nontender, nondistended. Extremities: No cyanosis or clubbing. No edema  Skin:     No rashes and normal coloration. Warm and dry. Neuro:  CN II-XII grossly intact. Psych:  Normal mood and affect.       I have personally reviewed labs and tests:  Recent Labs:  Recent Results (from the past 48 hour(s))   CBC with Auto Differential    Collection Time: 03/03/23 11:15 PM   Result Value Ref Range    WBC 11.3 (H) 4.3 - 11.1 K/uL    RBC 4.54 4.05 - 5.2 M/uL    Hemoglobin 14.0 11.7 - 15.4 g/dL    Hematocrit 42.0 35.8 - 46.3 %    MCV 92.5 82 - 102 FL    MCH 30.8 26.1 - 32.9 PG    MCHC 33.3 31.4 - 35.0 g/dL    RDW 13.2 11.9 - 14.6 %    Platelets 648 668 - 224 K/uL    MPV 10.5 9.4 - 12.3 FL    nRBC 0.00 0.0 - 0.2 K/uL    Differential Type AUTOMATED      Seg Neutrophils 68 43 - 78 %    Lymphocytes 23 13 - 44 %    Monocytes 7 4.0 - 12.0 %    Eosinophils % 2 0.5 - 7.8 %    Basophils 0 0.0 - 2.0 %    Immature Granulocytes 0 0.0 - 5.0 %    Segs Absolute 7.6 1.7 - 8.2 K/UL    Absolute Lymph # 2.6 0.5 - 4.6 K/UL    Absolute Mono # 0.8 0.1 - 1.3 K/UL    Absolute Eos # 0.3 0.0 - 0.8 K/UL    Basophils Absolute 0.0 0.0 - 0.2 K/UL    Absolute Immature Granulocyte 0.0 0.0 - 0.5 K/UL   Comprehensive Metabolic Panel    Collection Time: 03/03/23 11:15 PM   Result Value Ref Range    Sodium 135 133 - 143 mmol/L    Potassium 4.3 3.5 - 5.1 mmol/L    Chloride 104 101 - 110 mmol/L    CO2 27 21 - 32 mmol/L    Anion Gap 4 2 - 11 mmol/L    Glucose 109 (H) 65 - 100 mg/dL    BUN 20 8 - 23 MG/DL    Creatinine 1.00 0.6 - 1.0 MG/DL    Est, Glom Filt Rate >60 >60 ml/min/1.73m2    Calcium 9.6 8.3 - 10.4 MG/DL    Total Bilirubin 0.2 0.2 - 1.1 MG/DL    ALT 19 12 - 65 U/L    AST 10 (L) 15 - 37 U/L    Alk Phosphatase 62 50 - 136 U/L    Total Protein 7.2 6.3 - 8.2 g/dL    Albumin 3.5 3.2 - 4.6 g/dL    Globulin 3.7 2.8 - 4.5 g/dL    Albumin/Globulin Ratio 0.9 0.4 - 1.6     Lipase    Collection Time: 03/03/23 11:15 PM   Result Value Ref Range    Lipase 129 73 - 393 U/L   Urinalysis    Collection Time: 03/04/23  4:56 AM   Result Value Ref Range    Color, UA YELLOW/STRAW      Appearance CLEAR      Specific Gravity, UA 1.013 1.001 - 1.023      pH, Urine 5.5 5.0 - 9.0      Protein, UA TRACE (A) NEG mg/dL    Glucose, UA Negative mg/dL    Ketones, Urine Negative NEG mg/dL    Bilirubin Urine Negative NEG      Blood, Urine SMALL (A) NEG      Urobilinogen, Urine 0.2 0.2 - 1.0 EU/dL    Nitrite, Urine Negative NEG      Leukocyte Esterase, Urine Negative NEG     Urinalysis, Micro    Collection Time: 03/04/23  4:56 AM   Result Value Ref Range    WBC, UA 0-3 0 /hpf    RBC, UA 0-3 0 /hpf    Epithelial Cells UA 3-5 0 /hpf    BACTERIA, URINE 0 0 /hpf    Casts HYALINE 0 /lpf    Crystals 0 0 /LPF    Mucus, UA 0 0 /lpf    Other observations RESULTS VERIFIED MANUALLY     CBC with Auto Differential    Collection Time: 03/05/23  5:51 AM   Result Value Ref Range    WBC 6.4 4.3 - 11.1 K/uL    RBC 4.05 4.05 - 5.2 M/uL    Hemoglobin 12.3 11.7 - 15.4 g/dL    Hematocrit 37.9 35.8 - 46.3 %    MCV 93.6 82 - 102 FL    MCH 30.4 26.1 - 32.9 PG    MCHC 32.5 31.4 - 35.0 g/dL    RDW 13.4 11.9 - 14.6 %    Platelets 424 790 - 588 K/uL    MPV 10.4 9.4 - 12.3 FL    nRBC 0.00 0.0 - 0.2 K/uL    Differential Type AUTOMATED      Seg Neutrophils 55 43 - 78 %    Lymphocytes 35 13 - 44 %    Monocytes 7 4.0 - 12.0 %    Eosinophils % 3 0.5 - 7.8 %    Basophils 0 0.0 - 2.0 %    Immature Granulocytes 0 0.0 - 5.0 %    Segs Absolute 3.4 1.7 - 8.2 K/UL    Absolute Lymph # 2.3 0.5 - 4.6 K/UL    Absolute Mono # 0.5 0.1 - 1.3 K/UL    Absolute Eos # 0.2 0.0 - 0.8 K/UL    Basophils Absolute 0.0 0.0 - 0.2 K/UL    Absolute Immature Granulocyte 0.0 0.0 - 0.5 K/UL   Basic Metabolic Panel    Collection Time: 03/05/23  5:51 AM   Result Value Ref Range    Sodium 140 133 - 143 mmol/L    Potassium 3.9 3.5 - 5.1 mmol/L    Chloride 113 (H) 101 - 110 mmol/L    CO2 25 21 - 32 mmol/L    Anion Gap 2 2 - 11 mmol/L    Glucose 93 65 - 100 mg/dL    BUN 12 8 - 23 MG/DL    Creatinine 0.70 0.6 - 1.0 MG/DL    Est, Glom Filt Rate >60 >60 ml/min/1.73m2    Calcium 8.5 8.3 - 10.4 MG/DL       I have personally reviewed imaging studies:  Other Studies:  XR ABDOMEN (KUB) (SINGLE AP VIEW)   Final Result   Nonobstructive bowel gas pattern. CT ABDOMEN PELVIS RENAL STONE   Final Result   1.  There are distended fluid-filled mid small bowel loops without exact    transition point. The distal small bowel is decompressed. Findings are    suggestive is of a partial small bowel obstruction versus enteritis, but the    etiology is unclear. 2.  Cholecystectomy. 3.  There are a couple of 4 cm fat-containing hernias in the epigastric and    supraumbilical regions. Fransisca Pitts M.D.    3/4/2023 12:20:00 AM          Current Meds:  Current Facility-Administered Medications   Medication Dose Route Frequency    sodium chloride flush 0.9 % injection 5-40 mL  5-40 mL IntraVENous 2 times per day    sodium chloride flush 0.9 % injection 5-40 mL  5-40 mL IntraVENous PRN    0.9 % sodium chloride infusion   IntraVENous PRN    enoxaparin Sodium (LOVENOX) injection 30 mg  30 mg SubCUTAneous BID    ondansetron (ZOFRAN-ODT) disintegrating tablet 4 mg  4 mg Oral Q8H PRN    Or    ondansetron (ZOFRAN) injection 4 mg  4 mg IntraVENous Q6H PRN    polyethylene glycol (GLYCOLAX) packet 17 g  17 g Oral Daily PRN    acetaminophen (TYLENOL) tablet 650 mg  650 mg Oral Q6H PRN    Or    acetaminophen (TYLENOL) suppository 650 mg  650 mg Rectal Q6H PRN    lactated ringers IV soln infusion   IntraVENous Continuous    carvedilol (COREG) tablet 6.25 mg  6.25 mg Oral BID    aspirin chewable tablet 81 mg  81 mg Oral Daily    clopidogrel (PLAVIX) tablet 75 mg  75 mg Oral Daily    fluticasone (FLONASE) 50 MCG/ACT nasal spray 2 spray  2 spray Nasal Daily    levothyroxine (SYNTHROID) tablet 125 mcg  125 mcg Oral Daily    rosuvastatin (CRESTOR) tablet 10 mg  10 mg Oral Nightly    HYDROcodone-acetaminophen (NORCO)  MG per tablet 1 tablet  1 tablet Oral 4x Daily PRN    0.9 % sodium chloride infusion   IntraVENous Continuous       Signed:  Rachell Stinson MD    Part of this note may have been written by using a voice dictation software. The note has been proof read but may still contain some grammatical/other typographical errors.

## 2023-03-05 NOTE — PLAN OF CARE
Problem: Discharge Planning  Goal: Discharge to home or other facility with appropriate resources  3/5/2023 0748 by Alice Mota RN  Outcome: Progressing  3/4/2023 2356 by Alexis Kumar RN  Outcome: Progressing     Problem: Pain  Goal: Verbalizes/displays adequate comfort level or baseline comfort level  3/5/2023 0748 by Alice Mota RN  Outcome: Progressing  3/4/2023 2356 by Alexis Kumar RN  Outcome: Progressing     Problem: Safety - Adult  Goal: Free from fall injury  3/4/2023 2356 by Alexis Kumar RN  Outcome: Progressing

## 2023-03-05 NOTE — PROGRESS NOTES
Uneventful shift. Sips and chips w/ meds. IVF infusing per order. Voiding w/ BRP. Pt denies BM. PRN pain meds given x 2 for chronic back pain. Rounds performed throughout shift. Pt denies needs at this time. Bed in low position, locked and call light/personal items within reach. Will report to day shift nurse.

## 2023-03-06 PROCEDURE — 6360000002 HC RX W HCPCS: Performed by: STUDENT IN AN ORGANIZED HEALTH CARE EDUCATION/TRAINING PROGRAM

## 2023-03-06 PROCEDURE — 2580000003 HC RX 258: Performed by: INTERNAL MEDICINE

## 2023-03-06 PROCEDURE — 2580000003 HC RX 258: Performed by: EMERGENCY MEDICINE

## 2023-03-06 PROCEDURE — 6370000000 HC RX 637 (ALT 250 FOR IP): Performed by: INTERNAL MEDICINE

## 2023-03-06 PROCEDURE — G0378 HOSPITAL OBSERVATION PER HR: HCPCS

## 2023-03-06 PROCEDURE — 1100000000 HC RM PRIVATE

## 2023-03-06 PROCEDURE — 96372 THER/PROPH/DIAG INJ SC/IM: CPT

## 2023-03-06 RX ORDER — HYDRALAZINE HYDROCHLORIDE 20 MG/ML
10 INJECTION INTRAMUSCULAR; INTRAVENOUS EVERY 6 HOURS PRN
Status: DISCONTINUED | OUTPATIENT
Start: 2023-03-06 | End: 2023-03-07 | Stop reason: HOSPADM

## 2023-03-06 RX ADMIN — HYDROCODONE BITARTRATE AND ACETAMINOPHEN 1 TABLET: 10; 325 TABLET ORAL at 09:28

## 2023-03-06 RX ADMIN — HYDROCODONE BITARTRATE AND ACETAMINOPHEN 1 TABLET: 10; 325 TABLET ORAL at 00:18

## 2023-03-06 RX ADMIN — FLUTICASONE PROPIONATE 2 SPRAY: 50 SPRAY, METERED NASAL at 08:43

## 2023-03-06 RX ADMIN — SODIUM CHLORIDE, PRESERVATIVE FREE 10 ML: 5 INJECTION INTRAVENOUS at 20:37

## 2023-03-06 RX ADMIN — CARVEDILOL 6.25 MG: 6.25 TABLET, FILM COATED ORAL at 20:37

## 2023-03-06 RX ADMIN — HYDROCODONE BITARTRATE AND ACETAMINOPHEN 1 TABLET: 10; 325 TABLET ORAL at 18:44

## 2023-03-06 RX ADMIN — LEVOTHYROXINE SODIUM 125 MCG: 0.12 TABLET ORAL at 05:17

## 2023-03-06 RX ADMIN — SODIUM CHLORIDE, PRESERVATIVE FREE 10 ML: 5 INJECTION INTRAVENOUS at 08:44

## 2023-03-06 RX ADMIN — SODIUM CHLORIDE: 9 INJECTION, SOLUTION INTRAVENOUS at 08:28

## 2023-03-06 RX ADMIN — SODIUM CHLORIDE: 9 INJECTION, SOLUTION INTRAVENOUS at 00:20

## 2023-03-06 RX ADMIN — ASPIRIN 81 MG 81 MG: 81 TABLET ORAL at 08:43

## 2023-03-06 RX ADMIN — CLOPIDOGREL BISULFATE 75 MG: 75 TABLET ORAL at 08:44

## 2023-03-06 RX ADMIN — ENOXAPARIN SODIUM 40 MG: 100 INJECTION SUBCUTANEOUS at 08:43

## 2023-03-06 RX ADMIN — ROSUVASTATIN 10 MG: 10 TABLET, FILM COATED ORAL at 20:37

## 2023-03-06 RX ADMIN — CARVEDILOL 6.25 MG: 6.25 TABLET, FILM COATED ORAL at 08:43

## 2023-03-06 ASSESSMENT — PAIN SCALES - GENERAL
PAINLEVEL_OUTOF10: 8
PAINLEVEL_OUTOF10: 0
PAINLEVEL_OUTOF10: 7

## 2023-03-06 ASSESSMENT — PAIN - FUNCTIONAL ASSESSMENT: PAIN_FUNCTIONAL_ASSESSMENT: ACTIVITIES ARE NOT PREVENTED

## 2023-03-06 ASSESSMENT — PAIN DESCRIPTION - LOCATION: LOCATION: BACK

## 2023-03-06 ASSESSMENT — PAIN DESCRIPTION - ORIENTATION: ORIENTATION: MID

## 2023-03-06 ASSESSMENT — PAIN DESCRIPTION - DESCRIPTORS: DESCRIPTORS: ACHING;DISCOMFORT

## 2023-03-06 NOTE — PROGRESS NOTES
Hourly rounds in progress. Bed low and locked and call light within reach. Patient has been resting today with family visited earlier. Patient medicated for pain per MAR. Denies nausea or vomiting. Discounted fluids per Dr orders due to high BP. BP PRN med ordered today.  Will continue to monitor

## 2023-03-06 NOTE — PROGRESS NOTES
Pt resting in bed at present time without complaints. PRN pain med given per [facility]. Hourly rounds completed, all needs met this shift. Bed locked and low, call light within reach. Will give report to oncoming day shift nurse. Pt had BM.

## 2023-03-06 NOTE — CARE COORDINATION
Chart reviewed by . Patient known to CM from previous care. Patient readmitted through ED with Bowel Obstruction. No changes in demographics reported. Patient resides with family and is independent at baseline. No supportive care orders received or CM needs identified. Patient will return home when stable. Please consult CM if discharge needs arise. CM following. 03/06/23 3970   Service Assessment   Patient Orientation Alert and Oriented;Person;Place;Situation   Cognition Alert   History Provided By Patient;Medical Record   Primary [address] is: Legal Next of Kin  (Children)   PCP Verified by CM Yes   Last Visit to PCP Within last 3 months   Prior Functional Level Independent in ADLs/IADLs   Current Functional Level Independent in ADLs/IADLs   Can patient return to prior living arrangement Yes   Ability to make needs known: Good   Family able to assist with home care needs: Yes   Financial Resources Medicaid  ([employer])   Social/Functional History   Lives With Family   Type of Home Mobile home   Home Layout One level   Home Access Ramped entrance   Home Equipment   ([place])   ADL Assistance Independent   Ambulation Assistance Independent   Transfer Assistance Independent   Active  Yes   Mode of Transportation Car   Occupation On disability   Discharge Planning   Type of Residence Trailer/Mobile [address] [facility] Prior To Admission None   [address] [facility] No  (Patient is able to afford prescription medications.)   Patient expects to be discharged to: Trailer/mobile home   [address] Discharge   Transition of Care Consult (CM Consult) Discharge [address] Discharge None   [employer] Information Provided?  No   Mode of Transport at Discharge Other (see comment)  (Family.)   Confirm Follow Up Transport Self   Condition of Participation: Discharge Planning The Plan for Transition of Care is related to the following treatment goals: Return to baseline.

## 2023-03-06 NOTE — PROGRESS NOTES
Reviewed notes for new spiritual concerns      Will continue to assess how we can best serve this family        [place].       Per notes:       FULL CODE    NO ACP    MY CHART IS ACTIVE    LOS  3  DAYS

## 2023-03-06 NOTE — PROGRESS NOTES
Hospitalist Progress Note   Admit Date:  3/3/2023 11:11 PM   Name:  Joe Abreu   Age:  63 y.o. Sex:  female  :  1960   MRN:  [id number]   Room:  [facility]    Presenting Complaint: No chief complaint on file. Reason(s) for Admission: Bowel obstruction ([address] [K56.609]  Partial small bowel obstruction ([address] [K56.600]  Enteritis [K52.9]     Hospital Course:   Joe Abreu is a 63 y.o. female with medical history of hypertension, hyperlipidemia, hypothyroidism who presented with abdominal pain over the last few days. CT of abdomen pelvis showed distended fluid-filled mid small bowel loops without exact transition point. Distal small bowel decompressed. Findings consistent with partial small bowel obstruction versus enteritis. Subjective & 24hr Events (23): She was seen and examined at the bedside. Feeling much better this morning. Without abdominal pain. Patient been able to have x2 bowel movements. Patient denies cardiac chest pain, shortness of breath, fever/chills. Assessment & Plan: Bowel obstruction/enteritis  - Admit to medical floor  - IV fluids  - N.p.o. on admission, advance to clear liquid diet  - Empiric antibiotics  - KUB without evidence of small bowel obstruction   - 3/6 diet advanced to regular diet  - Patient can tolerate regular diet patient likely be discharged 3/7    Hypothyroidism  - Continue home medication    Nicotine use disorder  - Cessation on smoking discussed    GERD  - Continue home PPI    Hypertension  - Patient not septic  - Continue IV fluids  - Blood pressure medications on hold secondary to lower than normal blood pressure    Hyperlipidemia  - Continue home medications    Morbid obesity  - Counseled on weight loss and maintenance of healthy weight    PT/OT evals and PPD needed/ordered? Yes  Diet:  ADULT DIET;  Regular; Low Fat/Low Chol/High Fiber/2 gm Na  VTE prophylaxis: SCD's   Code status: Full Code    Hospital Problems:  Principal Problem: Bowel obstruction (HCC)  Active Problems:    Enteritis    Other specified hypothyroidism    Nicotine use disorder    GERD (gastroesophageal reflux disease)    HTN (hypertension)    HLD (hyperlipidemia)    Obesity, morbid ([address]  Resolved Problems:    * No resolved hospital problems. *      Objective:   Patient Vitals for the past 24 hrs:   Temp Pulse Resp BP SpO2   03/06/23 1140 98.2 °F (36.8 °C) (!) 49 18 (!) 183/62 92 %   03/06/23 0721 98.4 °F (36.9 °C) 58 18 (!) 180/67 92 %   03/06/23 0433 98.1 °F (36.7 °C) 54 18 (!) 146/87 91 %   03/06/23 0024 97.5 °F (36.4 °C) 55 18 (!) 143/64 94 %   03/06/23 0018 -- -- 18 -- --   03/05/23 2150 -- -- -- (!) 156/65 --   03/05/23 2039 98.8 °F (37.1 °C) 58 18 (!) 173/64 94 %   03/05/23 1550 98.4 °F (36.9 °C) 54 18 (!) 161/69 93 %       Oxygen Therapy  SpO2: 92 %  O2 Device: None (Room air)    Estimated body mass index is 39.84 kg/m² as calculated from the following:    Height as of this encounter: 5' 2\" (1.575 m). Weight as of this encounter: 217 lb 12.8 oz (98.8 kg). Intake/Output Summary (Last 24 hours) at 3/6/2023 1341  Last data filed at 3/6/2023 7453  Gross per 24 hour   Intake 240 ml   Output --   Net 240 ml         Physical Exam:   Blood pressure (!) 183/62, pulse (!) 49, temperature 98.2 °F (36.8 °C), temperature source Oral, resp. rate 18, height 5' 2\" (1.575 m), weight 217 lb 12.8 oz (98.8 kg), SpO2 92 %. General:    Well nourished. Head:  Normocephalic, atraumatic  Eyes:  Sclerae appear normal.  Pupils equally round. ENT:  Nares appear normal.  Moist oral mucosa  Neck:  No restricted ROM. Trachea midline   CV:   RRR. No m/r/g. No jugular venous distension. Lungs:   CTAB. No wheezing, rhonchi, or rales. Symmetric expansion. Abdomen:   Soft, nontender, nondistended. Extremities: No cyanosis or clubbing. No edema  Skin:     No rashes and normal coloration. Warm and dry. Neuro:  CN II-XII grossly intact. Psych:  Normal mood and affect. I have personally reviewed labs and tests:  Recent Labs:  Recent Results (from the past 48 hour(s))   CBC with Auto Differential    Collection Time: 03/05/23  5:51 AM   Result Value Ref Range    WBC 6.4 4.3 - 11.1 K/uL    RBC 4.05 4.05 - 5.2 M/uL    Hemoglobin 12.3 11.7 - 15.4 g/dL    Hematocrit 37.9 35.8 - 46.3 %    MCV 93.6 82 - 102 FL    MCH 30.4 26.1 - 32.9 PG    MCHC 32.5 31.4 - 35.0 g/dL    RDW 13.4 11.9 - 14.6 %    Platelets 875 420 - 947 K/uL    MPV 10.4 9.4 - 12.3 FL    nRBC 0.00 0.0 - 0.2 K/uL    Differential Type AUTOMATED      Seg Neutrophils 55 43 - 78 %    Lymphocytes 35 13 - 44 %    Monocytes 7 4.0 - 12.0 %    Eosinophils % 3 0.5 - 7.8 %    Basophils 0 0.0 - 2.0 %    Immature Granulocytes 0 0.0 - 5.0 %    Segs Absolute 3.4 1.7 - 8.2 K/UL    Absolute Lymph # 2.3 0.5 - 4.6 K/UL    Absolute Mono # 0.5 0.1 - 1.3 K/UL    Absolute Eos # 0.2 0.0 - 0.8 K/UL    Basophils Absolute 0.0 0.0 - 0.2 K/UL    Absolute Immature Granulocyte 0.0 0.0 - 0.5 K/UL   Basic Metabolic Panel    Collection Time: 03/05/23  5:51 AM   Result Value Ref Range    Sodium 140 133 - 143 mmol/L    Potassium 3.9 3.5 - 5.1 mmol/L    Chloride 113 (H) 101 - 110 mmol/L    CO2 25 21 - 32 mmol/L    Anion Gap 2 2 - 11 mmol/L    Glucose 93 65 - 100 mg/dL    BUN 12 8 - 23 MG/DL    Creatinine 0.70 0.6 - 1.0 MG/DL    Est, Glom Filt Rate >60 >60 ml/min/1.73m2    Calcium 8.5 8.3 - 10.4 MG/DL       I have personally reviewed imaging studies:  Other Studies:  XR ABDOMEN (KUB) (SINGLE AP VIEW)   Final Result   Nonobstructive bowel gas pattern. CT ABDOMEN PELVIS RENAL STONE   Final Result   1. There are distended fluid-filled mid small bowel loops without exact    transition point. The distal small bowel is decompressed. Findings are    suggestive is of a partial small bowel obstruction versus enteritis, but the    etiology is unclear. 2.  Cholecystectomy.    3.  There are a couple of 4 cm fat-containing hernias in the epigastric and supraumbilical regions. Gillian Malone M.D.    3/4/2023 12:20:00 AM          Current Meds:  Current Facility-Administered Medications   Medication Dose Route Frequency    hydrALAZINE (APRESOLINE) injection 10 mg  10 mg IntraVENous Q6H PRN    enoxaparin (LOVENOX) injection 40 mg  40 mg SubCUTAneous Daily    sodium chloride flush 0.9 % injection 5-40 mL  5-40 mL IntraVENous 2 times per day    sodium chloride flush 0.9 % injection 5-40 mL  5-40 mL IntraVENous PRN    0.9 % sodium chloride infusion   IntraVENous PRN    ondansetron (ZOFRAN-ODT) disintegrating tablet 4 mg  4 mg Oral Q8H PRN    Or    ondansetron (ZOFRAN) injection 4 mg  4 mg IntraVENous Q6H PRN    polyethylene glycol (GLYCOLAX) packet 17 g  17 g Oral Daily PRN    acetaminophen (TYLENOL) tablet 650 mg  650 mg Oral Q6H PRN    Or    acetaminophen (TYLENOL) suppository 650 mg  650 mg Rectal Q6H PRN    carvedilol (COREG) tablet 6.25 mg  6.25 mg Oral BID    aspirin chewable tablet 81 mg  81 mg Oral Daily    clopidogrel (PLAVIX) tablet 75 mg  75 mg Oral Daily    fluticasone (FLONASE) 50 MCG/ACT nasal spray 2 spray  2 spray Nasal Daily    levothyroxine (SYNTHROID) tablet 125 mcg  125 mcg Oral Daily    rosuvastatin (CRESTOR) tablet 10 mg  10 mg Oral Nightly    HYDROcodone-acetaminophen (NORCO)  MG per tablet 1 tablet  1 tablet Oral 4x Daily PRN       Signed:  Barbara Morales MD    Part of this note may have been written by using a voice dictation software. The note has been proof read but may still contain some grammatical/other typographical errors.

## 2023-03-07 VITALS
SYSTOLIC BLOOD PRESSURE: 171 MMHG | OXYGEN SATURATION: 96 % | WEIGHT: 217.8 LBS | HEART RATE: 54 BPM | HEIGHT: 62 IN | TEMPERATURE: 97.3 F | DIASTOLIC BLOOD PRESSURE: 69 MMHG | RESPIRATION RATE: 17 BRPM | BODY MASS INDEX: 40.08 KG/M2

## 2023-03-07 PROCEDURE — 2580000003 HC RX 258: Performed by: INTERNAL MEDICINE

## 2023-03-07 PROCEDURE — 6370000000 HC RX 637 (ALT 250 FOR IP): Performed by: INTERNAL MEDICINE

## 2023-03-07 PROCEDURE — 6360000002 HC RX W HCPCS: Performed by: STUDENT IN AN ORGANIZED HEALTH CARE EDUCATION/TRAINING PROGRAM

## 2023-03-07 RX ORDER — CARVEDILOL 3.12 MG/1
12.5 TABLET ORAL 2 TIMES DAILY
Status: DISCONTINUED | OUTPATIENT
Start: 2023-03-07 | End: 2023-03-07 | Stop reason: HOSPADM

## 2023-03-07 RX ADMIN — CARVEDILOL 6.25 MG: 6.25 TABLET, FILM COATED ORAL at 08:59

## 2023-03-07 RX ADMIN — SODIUM CHLORIDE, PRESERVATIVE FREE 10 ML: 5 INJECTION INTRAVENOUS at 08:59

## 2023-03-07 RX ADMIN — ASPIRIN 81 MG 81 MG: 81 TABLET ORAL at 08:59

## 2023-03-07 RX ADMIN — ENOXAPARIN SODIUM 40 MG: 100 INJECTION SUBCUTANEOUS at 08:58

## 2023-03-07 RX ADMIN — HYDRALAZINE HYDROCHLORIDE 10 MG: 20 INJECTION INTRAMUSCULAR; INTRAVENOUS at 08:37

## 2023-03-07 RX ADMIN — LEVOTHYROXINE SODIUM 125 MCG: 0.12 TABLET ORAL at 05:35

## 2023-03-07 RX ADMIN — HYDROCODONE BITARTRATE AND ACETAMINOPHEN 1 TABLET: 10; 325 TABLET ORAL at 07:49

## 2023-03-07 RX ADMIN — FLUTICASONE PROPIONATE 2 SPRAY: 50 SPRAY, METERED NASAL at 08:59

## 2023-03-07 RX ADMIN — CLOPIDOGREL BISULFATE 75 MG: 75 TABLET ORAL at 08:58

## 2023-03-07 NOTE — DISCHARGE SUMMARY
Hospitalist Discharge Summary   Admit Date:  3/3/2023 11:11 PM   DC Note date: 3/7/2023  Name:  Dharmesh Crooks   Age:  63 y.o. Sex:  female  :  1960   MRN:  [id number]   Room:  [facility]  PCP:  Andree Huff MD    Presenting Complaint: No chief complaint on file. Initial Admission Diagnosis: Bowel obstruction ([address] [K56.609]  Partial small bowel obstruction ([address] [K56.600]  Enteritis [K52.9]     Problem List for this Hospitalization (present on admission):    Principal Problem: Bowel obstruction (HCC)  Active Problems:    Enteritis    Other specified hypothyroidism    Nicotine use disorder    GERD (gastroesophageal reflux disease)    HTN (hypertension)    HLD (hyperlipidemia)    Obesity, morbid ([address]  Resolved Problems:    * No resolved hospital problems. *      Hospital Course:  63 y.o. female with medical history of hypertension, hyperlipidemia, hypothyroidism who presented with abdominal pain over the last few days. CT of abdomen pelvis showed distended fluid-filled mid small bowel loops without exact transition point. Distal small bowel decompressed. Findings consistent with partial small bowel obstruction versus enteritis. Patient admitted for bowel obstruction/enteritis. Patient was continued on IV fluids and n.p.o. on admission. Patient's diet was advanced and patient tolerated well. KUB without evidence of small bowel obstruction. On day of discharge patient was feeling better without any abdominal pains. Patient able to have bowel movements. Patient was feeling very well on day of discharge. Patient denies any cardiac chest pain, shortness of breath, abdominal pain, fever/chills. For patient's other comorbid conditions she was restarted back on her home medications. Patient was advised that she needs to follow-up with her primary care provider within next 3 to 5 days to discuss recent hospitalization any changes made to her medications.     Disposition: Discharge home  Diet: ADULT DIET; Regular; Low Fat/Low Chol/High Fiber/2 gm Na  Code Status: Full Code    Follow Ups:  Follow-up PCP next 3 to 5 days    Time spent in patient discharge and coordination 45 minutes. Follow up labs/diagnostics (ultimately defer to outpatient provider):  Defer to outpatient provider    Plan was discussed with patient. All questions answered. Patient was stable at time of discharge. Instructions given to call a physician or return if any concerns. Current Discharge Medication List        CONTINUE these medications which have NOT CHANGED    Details   ergocalciferol (ERGOCALCIFEROL) 1.25 MG (77627 UT) capsule ergocalciferol (vitamin D2) 1,250 mcg (50,000 unit) capsule   TAKE 1 CAPSULE ONCE A WEEK FOR VITAMIN D DEFICIENCY      rosuvastatin (CRESTOR) 10 MG tablet Take 10 mg by mouth at bedtime      carvedilol (COREG) 6.25 MG tablet TAKE 1 TABLET BY MOUTH TWICE A DAY      indapamide (LOZOL) 1.25 MG tablet TAKE 1 TABLET EVERY MORNING TO FLUID PILL/LOWER BLOOD PRESSURE      levothyroxine (SYNTHROID) 125 MCG tablet TAKE 1 TABLET BY MOUTH EVERY DAY      nitroGLYCERIN (NITROSTAT) 0.4 MG SL tablet Place 1 tablet under the tongue every 5 minutes as needed for Chest pain  Qty: 25 tablet, Refills: 3      losartan (COZAAR) 25 MG tablet Take 1 tablet by mouth daily  Qty: 30 tablet, Refills: 5      clopidogrel (PLAVIX) 75 MG tablet Take 1 tablet by mouth daily  Qty: 30 tablet, Refills: 11      albuterol sulfate  (90 Base) MCG/ACT inhaler Inhale 2 puffs into the lungs every 4 hours as needed      aspirin 81 MG chewable tablet Take 81 mg by mouth daily      HYDROcodone-acetaminophen (NORCO)  MG per tablet Take 1 tablet by mouth every 6 hours as needed.            STOP taking these medications       fluticasone (FLONASE) 50 MCG/ACT nasal spray Comments:   Reason for Stopping:               Some medications may have been reported old/obsolete and marked \"stop taking\" by the system; in reality pt was already off these meds; defer to outpatient or prescribing providers. Procedures done this admission:  * No surgery found *    Consults this admission:  None    Echocardiogram results:  02/07/23    TRANSTHORACIC ECHOCARDIOGRAM (TTE) COMPLETE (CONTRAST/BUBBLE/3D PRN) 02/08/2023 11:21 AM, 02/08/2023 12:00 AM (Final)    Interpretation Summary    Left Ventricle: Normal left ventricular systolic function. EF by 2D Simpsons Biplane is 60%. Left ventricle size is normal. Increased wall thickness. Normal wall motion. Normal diastolic function. Mitral Valve: Mildly thickened leaflet, at the posterior leaflet. Aorta: Ao root diameter is 3.1 cm. Ao Root Index is 1.57 cm/m2. Technical qualifiers: Color flow Doppler was performed and pulse wave and/or continuous wave Doppler was performed. Signed by: Fer Kearney MD on 2/8/2023 11:21 AM, Signed by: Unknown Provider Result on 2/8/2023 12:00 AM      Diagnostic Imaging/Tests:   XR CHEST (2 VW)    Result Date: 2/7/2023  No acute cardiopulmonary process. Debra Zuniga M.D. 2/7/2023 6:50:00 PM    XR ABDOMEN (KUB) (SINGLE AP VIEW)    Result Date: 3/5/2023  Nonobstructive bowel gas pattern. CT ABDOMEN PELVIS RENAL STONE    Result Date: 3/4/2023  1. There are distended fluid-filled mid small bowel loops without exact transition point. The distal small bowel is decompressed. Findings are suggestive is of a partial small bowel obstruction versus enteritis, but the etiology is unclear. 2.  Cholecystectomy. 3.  There are a couple of 4 cm fat-containing hernias in the epigastric and supraumbilical regions.   Jewel Contreras M.D. 3/4/2023 12:20:00 AM       Labs: Results:       BMP, Mg, Phos Recent Labs     03/05/23  0551      K 3.9   *   CO2 25   ANIONGAP 2   BUN 12   CREATININE 0.70   LABGLOM >60   CALCIUM 8.5   GLUCOSE 93      CBC Recent Labs     03/05/23  0551   WBC 6.4   RBC 4.05   HGB 12.3   HCT 37.9   MCV 93.6   MCH 30.4   MCHC 32.5   RDW 13.4    MPV 10.4   NRBC 0.00   SEGS 55   LYMPHOPCT 35   EOSRELPCT 3   MONOPCT 7   BASOPCT 0   IMMGRAN 0   SEGSABS 3.4   LYMPHSABS 2.3   EOSABS 0.2   MONOSABS 0.5   BASOSABS 0.0   ABSIMMGRAN 0.0      LFT No results for input(s): BILITOT, BILIDIR, ALKPHOS, AST, ALT, PROT, LABALBU, GLOB in the last 72 hours. Cardiac  Lab Results   Component Value Date/Time    TROPHS 1,382.4 02/08/2023 06:10 AM    TROPHS 1,390.7 02/07/2023 10:16 PM    TROPHS 19.4 02/07/2023 05:32 PM      Coags Lab Results   Component Value Date/Time    PROTIME 13.0 02/08/2023 01:03 AM    INR 0.9 02/08/2023 01:03 AM    APTT 29.2 02/08/2023 01:03 AM      A1c Lab Results   Component Value Date/Time    LABA1C 5.4 02/09/2023 08:21 AM     02/09/2023 08:21 AM      Lipids Lab Results   Component Value Date/Time    CHOL 128 02/08/2023 06:10 AM    LDLCALC 58.4 02/08/2023 06:10 AM    LABVLDL 24.6 02/08/2023 06:10 AM    HDL 45 02/08/2023 06:10 AM    CHOLHDLRATIO 2.8 02/08/2023 06:10 AM    TRIG 123 02/08/2023 06:10 AM      Thyroid  Lab Results   Component Value Date/Time    TSHELE 8.22 02/09/2023 08:21 AM        Most Recent UA Lab Results   Component Value Date/Time    COLORU YELLOW/STRAW 03/04/2023 04:56 AM    APPEARANCE CLEAR 03/04/2023 04:56 AM    SPECGRAV 1.013 03/04/2023 04:56 AM    LABPH 5.5 03/04/2023 04:56 AM    PROTEINU TRACE 03/04/2023 04:56 AM    GLUCOSEU Negative 03/04/2023 04:56 AM    KETUA Negative 03/04/2023 04:56 AM    BILIRUBINUR Negative 03/04/2023 04:56 AM    BLOODU SMALL 03/04/2023 04:56 AM    UROBILINOGEN 0.2 03/04/2023 04:56 AM    NITRU Negative 03/04/2023 04:56 AM    LEUKOCYTESUR Negative 03/04/2023 04:56 AM    WBCUA 0-3 03/04/2023 04:56 AM    RBCUA 0-3 03/04/2023 04:56 AM    EPITHUA 3-5 03/04/2023 04:56 AM    BACTERIA 0 03/04/2023 04:56 AM    LABCAST HYALINE 03/04/2023 04:56 AM    MUCUS 0 03/04/2023 04:56 AM        No results for input(s): CULTURE in the last 720 hours.     All Labs from Last 24 Hrs:  No results found for this or any previous visit (from the past 24 hour(s)). Allergies   Allergen Reactions    Citalopram Other (See Comments)     Headache    Gabapentin Other (See Comments)     Taking two would cause nightmares    Milnacipran Other (See Comments)     Headache    Pramipexole Nausea Only and Other (See Comments)     Abdominal pain    Varenicline Other (See Comments)     \"Couldn't take\"    Sulfa Antibiotics Nausea And Vomiting     Immunization History   Administered Date(s) Administered    Influenza Virus Vaccine 08/22/2014    Influenza, FLUARIX, FLULAVAL, FLUZONE (age 10 mo+) AND AFLURIA, (age 1 y+), PF, 0.5mL 11/23/2016    Influenza, FLUCELVAX, (age 10 mo+), MDCK, PF, 0.5mL 11/28/2017    Influenza, High Dose (Fluzone 65 yrs and older) 11/20/2015    Pneumococcal Vaccine 05/22/2013    Tdap (Boostrix, Adacel) 05/22/2014       Recent Vital Data:  Patient Vitals for the past 24 hrs:   Temp Pulse Resp BP SpO2   03/07/23 1120 97.3 °F (36.3 °C) 54 -- (!) 171/69 96 %   03/07/23 0837 -- -- -- (!) 184/75 --   03/07/23 0819 -- -- 17 -- --   03/07/23 0729 97.9 °F (36.6 °C) 50 18 (!) 198/98 95 %   03/07/23 0350 98.8 °F (37.1 °C) 53 19 (!) 161/67 92 %   03/06/23 2339 98.1 °F (36.7 °C) 54 19 (!) 149/66 93 %   03/06/23 2037 -- -- -- (!) 154/58 --   03/06/23 1931 97.5 °F (36.4 °C) 58 19 (!) 182/65 93 %   03/06/23 1541 98.4 °F (36.9 °C) 74 18 (!) 149/66 91 %   03/06/23 1140 98.2 °F (36.8 °C) (!) 49 18 (!) 183/62 92 %       Oxygen Therapy  SpO2: 96 %  O2 Device: None (Room air)    Estimated body mass index is 39.84 kg/m² as calculated from the following:    Height as of this encounter: 5' 2\" (1.575 m). Weight as of this encounter: 217 lb 12.8 oz (98.8 kg). No intake or output data in the 24 hours ending 03/07/23 1136      Physical Exam:  General:    Well nourished. No overt distress  Head:  Normocephalic, atraumatic  Eyes:  Sclerae appear normal.  Pupils equally round. HENT:  Nares appear normal, no drainage.   Moist mucous membranes  Neck:  No restricted ROM. Trachea midline  CV:   RRR. No m/r/g. No JVD  Lungs:   CTAB. No wheezing, rhonchi, or rales. Respirations even, unlabored  Abdomen:   Soft, nontender, nondistended. Extremities: Warm and dry. No cyanosis or clubbing. No edema. Skin:     No rashes. Normal coloration  Neuro:  CN II-XII grossly intact. Psych:  Normal mood and affect. Signed:  Kayla Lees MD    Part of this note may have been written by using a voice dictation software. The note has been proof read but may still contain some grammatical/other typographical errors.

## 2023-03-07 NOTE — CARE COORDINATION
Pt is for discharge home today with family and no needs/supportive care orders received for CM at this time. 03/06/23 1405   Service Assessment   Patient Orientation Alert and Oriented;Person;Place;Situation   Cognition Alert   History Provided By Patient;Medical Record   Primary [address] is: Legal Next of Kin  (Children)   PCP Verified by CM Yes   Last Visit to PCP Within last 3 months   Prior Functional Level Independent in ADLs/IADLs   Current Functional Level Independent in ADLs/IADLs   Can patient return to prior living arrangement Yes   Ability to make needs known: Good   Family able to assist with home care needs: Yes   Financial Resources Medicaid  ([employer])   Social/Functional History   Lives With Family   Type of Home Mobile home   Home Layout One level   Home Access Ramped entrance   Home Equipment   (Nebulizer)   ADL Assistance Independent   Ambulation Assistance Independent   Transfer Assistance Independent   Active  Yes   Mode of Transportation Car   Occupation On disability   Discharge Planning   Type of Residence Trailer/Mobile [address] [facility] Prior To Admission None   [address] [facility] No  (Patient is able to afford prescription medications.)   Patient expects to be discharged to: Trailer/mobile home   [address] Discharge   Transition of Care Consult (CM Consult) Discharge [address] Discharge None   [employer] Information Provided? No   Mode of Transport at Discharge Other (see comment)  (Family.)   Confirm Follow Up Transport Self   Condition of Participation: Discharge Planning   The Plan for Transition of Care is related to the following treatment goals: Return to baseline.

## 2023-04-05 ENCOUNTER — TELEPHONE (OUTPATIENT)
Dept: CARDIOLOGY CLINIC | Age: 63
End: 2023-04-05

## 2023-04-05 NOTE — TELEPHONE ENCOUNTER
----- Message from Natalia Rm DO sent at 4/5/2023  9:37 AM EDT -----  Please call the patient. CAROTID US was ok, nothing major or concerning, NO BAD blockages SEEN on the study. Keep regular appointment time, no med changes. Will review more at follow up and get plan for the future.     Thanks,   Marline Eisenberg

## 2023-05-30 ENCOUNTER — OFFICE VISIT (OUTPATIENT)
Age: 63
End: 2023-05-30
Payer: MEDICARE

## 2023-05-30 VITALS
HEART RATE: 52 BPM | DIASTOLIC BLOOD PRESSURE: 73 MMHG | SYSTOLIC BLOOD PRESSURE: 135 MMHG | BODY MASS INDEX: 40.96 KG/M2 | HEIGHT: 62 IN | WEIGHT: 222.6 LBS

## 2023-05-30 DIAGNOSIS — I25.10 CORONARY ARTERY DISEASE INVOLVING NATIVE CORONARY ARTERY OF NATIVE HEART WITHOUT ANGINA PECTORIS: ICD-10-CM

## 2023-05-30 DIAGNOSIS — Z72.0 TOBACCO USE: ICD-10-CM

## 2023-05-30 DIAGNOSIS — G47.33 OSA (OBSTRUCTIVE SLEEP APNEA): ICD-10-CM

## 2023-05-30 DIAGNOSIS — I10 PRIMARY HYPERTENSION: Primary | ICD-10-CM

## 2023-05-30 PROCEDURE — G8417 CALC BMI ABV UP PARAM F/U: HCPCS | Performed by: INTERNAL MEDICINE

## 2023-05-30 PROCEDURE — 3078F DIAST BP <80 MM HG: CPT | Performed by: INTERNAL MEDICINE

## 2023-05-30 PROCEDURE — 99214 OFFICE O/P EST MOD 30 MIN: CPT | Performed by: INTERNAL MEDICINE

## 2023-05-30 PROCEDURE — 3017F COLORECTAL CA SCREEN DOC REV: CPT | Performed by: INTERNAL MEDICINE

## 2023-05-30 PROCEDURE — 3075F SYST BP GE 130 - 139MM HG: CPT | Performed by: INTERNAL MEDICINE

## 2023-05-30 PROCEDURE — 4004F PT TOBACCO SCREEN RCVD TLK: CPT | Performed by: INTERNAL MEDICINE

## 2023-05-30 PROCEDURE — G8427 DOCREV CUR MEDS BY ELIG CLIN: HCPCS | Performed by: INTERNAL MEDICINE

## 2023-05-30 RX ORDER — DIAZEPAM 10 MG/1
TABLET ORAL
COMMUNITY
Start: 2023-05-16

## 2023-05-30 RX ORDER — ACETAMINOPHEN, CHLORPHENIRAMINE MALEATE, AND PHENYLEPHRINE HCL 325; 4; 10 MG/1; MG/1; MG/1
TABLET, MULTILAYER ORAL AS NEEDED
COMMUNITY

## 2023-05-30 NOTE — PROGRESS NOTES
ROS:    No obvious pertinent positives on review of systems except for what was outlined in the HPI today.       PHYSICAL EXAM:     /73   Pulse 52   Ht 5' 2\" (1.575 m)   Wt 222 lb 9.6 oz (101 kg)   BMI 40.71 kg/m²    General/Constitutional:   Alert and oriented x 3, no acute distress  HEENT:   normocephalic, atraumatic, moist mucous membranes  Neck:   No JVD or carotid bruits bilaterally  Cardiovascular:   regular rate and rhythm, no murmur/rub/gallop appreciated  Pulmonary:   clear to auscultation bilaterally, no respiratory distress  Abdomen:   soft, non-tender, non-distended  Ext:   No sig LE edema bilaterally  Skin:  warm and dry, no obvious rashes seen  Neuro:   no obvious sensory or motor deficits  Psychiatric:   normal mood and affect      Lab Results   Component Value Date/Time     03/05/2023 05:51 AM    K 3.9 03/05/2023 05:51 AM     03/05/2023 05:51 AM    CO2 25 03/05/2023 05:51 AM    BUN 12 03/05/2023 05:51 AM    CREATININE 0.70 03/05/2023 05:51 AM    GLUCOSE 93 03/05/2023 05:51 AM    CALCIUM 8.5 03/05/2023 05:51 AM        Lab Results   Component Value Date    WBC 6.4 03/05/2023    HGB 12.3 03/05/2023    HCT 37.9 03/05/2023    MCV 93.6 03/05/2023     03/05/2023       Lab Results   Component Value Date    TSH 1.460 03/31/2021       Lab Results   Component Value Date    LABA1C 5.4 02/09/2023     Lab Results   Component Value Date     02/09/2023       Lab Results   Component Value Date    CHOL 128 02/08/2023    CHOL 185 03/31/2021    CHOL 167 01/20/2021     Lab Results   Component Value Date    TRIG 123 02/08/2023    TRIG 129 03/31/2021    TRIG 119 01/20/2021     Lab Results   Component Value Date    HDL 45 02/08/2023    HDL 49 03/31/2021    HDL 39 (L) 01/20/2021     Lab Results   Component Value Date    LDLCALC 58.4 02/08/2023    LDLCALC 110.2 (H) 03/31/2021    LDLCALC 104.2 (H) 01/20/2021     Lab Results   Component Value Date    LABVLDL 24.6 (H) 02/08/2023

## 2024-08-02 ENCOUNTER — HOSPITAL ENCOUNTER (EMERGENCY)
Age: 64
Discharge: HOME | End: 2024-08-02
Payer: MEDICARE

## 2024-08-02 ENCOUNTER — APPOINTMENT (OUTPATIENT)
Dept: GENERAL RADIOLOGY | Age: 64
End: 2024-08-02
Payer: MEDICARE

## 2024-08-02 VITALS
WEIGHT: 220 LBS | RESPIRATION RATE: 20 BRPM | HEIGHT: 62 IN | OXYGEN SATURATION: 93 % | TEMPERATURE: 97.9 F | DIASTOLIC BLOOD PRESSURE: 77 MMHG | BODY MASS INDEX: 40.48 KG/M2 | SYSTOLIC BLOOD PRESSURE: 144 MMHG | HEART RATE: 54 BPM

## 2024-08-02 DIAGNOSIS — I10 HYPERTENSION, UNSPECIFIED TYPE: ICD-10-CM

## 2024-08-02 DIAGNOSIS — R07.9 CHEST PAIN, UNSPECIFIED TYPE: Primary | ICD-10-CM

## 2024-08-02 LAB
ALBUMIN SERPL-MCNC: 3.5 G/DL (ref 3.2–4.6)
ALBUMIN/GLOB SERPL: 1.1 (ref 1–1.9)
ALP SERPL-CCNC: 75 U/L (ref 35–104)
ALT SERPL-CCNC: 11 U/L (ref 12–65)
ANION GAP SERPL CALC-SCNC: 10 MMOL/L (ref 9–18)
AST SERPL-CCNC: 16 U/L (ref 15–37)
BASOPHILS # BLD: 0 K/UL (ref 0–0.2)
BASOPHILS NFR BLD: 1 % (ref 0–2)
BILIRUB SERPL-MCNC: 0.4 MG/DL (ref 0–1.2)
BUN SERPL-MCNC: 12 MG/DL (ref 8–23)
CALCIUM SERPL-MCNC: 9.3 MG/DL (ref 8.8–10.2)
CHLORIDE SERPL-SCNC: 106 MMOL/L (ref 98–107)
CO2 SERPL-SCNC: 24 MMOL/L (ref 20–28)
CREAT SERPL-MCNC: 0.77 MG/DL (ref 0.6–1.1)
DIFFERENTIAL METHOD BLD: NORMAL
EOSINOPHIL # BLD: 0.2 K/UL (ref 0–0.8)
EOSINOPHIL NFR BLD: 3 % (ref 0.5–7.8)
ERYTHROCYTE [DISTWIDTH] IN BLOOD BY AUTOMATED COUNT: 14.5 % (ref 11.9–14.6)
GLOBULIN SER CALC-MCNC: 3.3 G/DL (ref 2.3–3.5)
GLUCOSE SERPL-MCNC: 90 MG/DL (ref 70–99)
HCT VFR BLD AUTO: 41.2 % (ref 35.8–46.3)
HGB BLD-MCNC: 13.2 G/DL (ref 11.7–15.4)
IMM GRANULOCYTES # BLD AUTO: 0 K/UL (ref 0–0.5)
IMM GRANULOCYTES NFR BLD AUTO: 0 % (ref 0–5)
LYMPHOCYTES # BLD: 2.6 K/UL (ref 0.5–4.6)
LYMPHOCYTES NFR BLD: 37 % (ref 13–44)
MAGNESIUM SERPL-MCNC: 1.9 MG/DL (ref 1.8–2.4)
MCH RBC QN AUTO: 30.3 PG (ref 26.1–32.9)
MCHC RBC AUTO-ENTMCNC: 32 G/DL (ref 31.4–35)
MCV RBC AUTO: 94.7 FL (ref 82–102)
MONOCYTES # BLD: 0.6 K/UL (ref 0.1–1.3)
MONOCYTES NFR BLD: 8 % (ref 4–12)
NEUTS SEG # BLD: 3.6 K/UL (ref 1.7–8.2)
NEUTS SEG NFR BLD: 51 % (ref 43–78)
NRBC # BLD: 0 K/UL (ref 0–0.2)
NT PRO BNP: 161 PG/ML (ref 0–125)
PLATELET # BLD AUTO: 233 K/UL (ref 150–450)
PMV BLD AUTO: 11.8 FL (ref 9.4–12.3)
POTASSIUM SERPL-SCNC: 3.5 MMOL/L (ref 3.5–5.1)
PROCALCITONIN SERPL-MCNC: 0.04 NG/ML (ref 0–0.1)
PROT SERPL-MCNC: 6.8 G/DL (ref 6.3–8.2)
RBC # BLD AUTO: 4.35 M/UL (ref 4.05–5.2)
SODIUM SERPL-SCNC: 140 MMOL/L (ref 136–145)
TROPONIN T SERPL HS-MCNC: 11 NG/L (ref 0–14)
TROPONIN T SERPL HS-MCNC: 17 NG/L (ref 0–14)
WBC # BLD AUTO: 7 K/UL (ref 4.3–11.1)

## 2024-08-02 PROCEDURE — 99285 EMERGENCY DEPT VISIT HI MDM: CPT

## 2024-08-02 PROCEDURE — 84145 PROCALCITONIN (PCT): CPT

## 2024-08-02 PROCEDURE — 84484 ASSAY OF TROPONIN QUANT: CPT

## 2024-08-02 PROCEDURE — 85025 COMPLETE CBC W/AUTO DIFF WBC: CPT

## 2024-08-02 PROCEDURE — 80053 COMPREHEN METABOLIC PANEL: CPT

## 2024-08-02 PROCEDURE — 6370000000 HC RX 637 (ALT 250 FOR IP)

## 2024-08-02 PROCEDURE — 83880 ASSAY OF NATRIURETIC PEPTIDE: CPT

## 2024-08-02 PROCEDURE — 93005 ELECTROCARDIOGRAM TRACING: CPT | Performed by: EMERGENCY MEDICINE

## 2024-08-02 PROCEDURE — 36415 COLL VENOUS BLD VENIPUNCTURE: CPT

## 2024-08-02 PROCEDURE — 71046 X-RAY EXAM CHEST 2 VIEWS: CPT

## 2024-08-02 PROCEDURE — 83735 ASSAY OF MAGNESIUM: CPT

## 2024-08-02 RX ORDER — NITROGLYCERIN 0.4 MG/1
0.4 TABLET SUBLINGUAL EVERY 5 MIN PRN
Status: DISCONTINUED | OUTPATIENT
Start: 2024-08-02 | End: 2024-08-02 | Stop reason: HOSPADM

## 2024-08-02 RX ORDER — ASPIRIN 81 MG/1
324 TABLET, CHEWABLE ORAL ONCE
Status: COMPLETED | OUTPATIENT
Start: 2024-08-02 | End: 2024-08-02

## 2024-08-02 RX ORDER — ACETAMINOPHEN 500 MG
1000 TABLET ORAL
Status: COMPLETED | OUTPATIENT
Start: 2024-08-02 | End: 2024-08-02

## 2024-08-02 RX ADMIN — NITROGLYCERIN 0.4 MG: 0.4 TABLET SUBLINGUAL at 17:23

## 2024-08-02 RX ADMIN — ASPIRIN 324 MG: 81 TABLET, CHEWABLE ORAL at 15:30

## 2024-08-02 RX ADMIN — ACETAMINOPHEN 1000 MG: 500 TABLET, FILM COATED ORAL at 15:39

## 2024-08-02 ASSESSMENT — ENCOUNTER SYMPTOMS
GASTROINTESTINAL NEGATIVE: 1
RESPIRATORY NEGATIVE: 1
ALLERGIC/IMMUNOLOGIC NEGATIVE: 1
EYES NEGATIVE: 1

## 2024-08-02 ASSESSMENT — PAIN DESCRIPTION - LOCATION: LOCATION: CHEST;NECK

## 2024-08-02 ASSESSMENT — PAIN DESCRIPTION - DESCRIPTORS: DESCRIPTORS: TIGHTNESS;DULL

## 2024-08-02 ASSESSMENT — PAIN SCALES - GENERAL
PAINLEVEL_OUTOF10: 4
PAINLEVEL_OUTOF10: 4

## 2024-08-02 ASSESSMENT — PAIN DESCRIPTION - ORIENTATION: ORIENTATION: RIGHT;LEFT;MID

## 2024-08-02 ASSESSMENT — PAIN - FUNCTIONAL ASSESSMENT: PAIN_FUNCTIONAL_ASSESSMENT: 0-10

## 2024-08-02 NOTE — ED TRIAGE NOTES
Pt to ED ambulatory to triage with c/o chest pain, epigastric/midsternal that radiates into the left side of her chest that began around 0630. Pt states bilateral jaw pain as well. Denies shortness of breath. States hx of 2 MI, 2 stents, states jaw pain \"gland pain\" feels like previous MI. Pt states similar episode a few weeks ago and taking ASA with relief. Pt denies taking ASA today states no NTG PTA. Pt denies worsening factors at this time. States pain is constant dull tightness.

## 2024-08-02 NOTE — DISCHARGE INSTRUCTIONS
As we discussed, I want you to follow-up with your PCP and cardiologist as soon as possible for re-check and further recommendations given your past medical history. Please use nitro as prescribed at home if pain is present. Please call [phone] if pain does not subside. As we discussed, please return to the emergency department for any new, worsening, concerning symptoms.

## 2024-08-02 NOTE — ED NOTES
Patient mobility status  with no difficulty. Provider aware     I have reviewed discharge instructions with the patient.  The patient verbalized understanding.    Patient left ED via Discharge Method: ambulatory to Home with  self .    Opportunity for questions and clarification provided.     Patient given 0 scripts.       Pt did not want last set of vital signs.     Dilma Messina, RN  08/02/24 [id number]

## 2024-08-02 NOTE — ED PROVIDER NOTES
Emergency Department Provider Note       PCP: Casey Berry MD   Age: 63 y.o.   Sex: female     DISPOSITION Decision To Discharge 08/02/2024 05:45:19 PM       ICD-10-CM    1. Chest pain, unspecified type  R07.9       2. Hypertension, unspecified type  I10           Medical Decision Making     In summary, this is a 63 yof coming in for CP since this am. Given her hx of CAD w/ PCIx2 in 2021 ,HTN and HLD, a cardiac work up was obtained. BP was noted to be 185/70 in triage. She reported a pain 3/10 while in the ER. EKG with no acute ischemic changes. First trop normal with mild elevation to 17 in the second which is likely secondary to her uncontrolled HTN. S/p 324 ASA and 1 sublingual nitro, she is pain free and BP is normalizing . This patient, case and disposition was discussed with Dr. Brooke, attending physician who is in agreement with the below plan. Will have patient follow up with cardiology as soon as possible for recheck and further recommendations. Has nitro at home and was instructed on use in which she verbalizes understanding. Very strict return precautions were discussed in which she verbalizes understanding.   ED Course as of 08/02/24 1747   Fri Aug 02, 2024   1156 ECG interpretation for ECG dated 8/2/2024 at 11:53 AM: ECG reveals a sinus bradycardia with sinus arrhythmia at a rate of 56 bpm with a normal [place] and QTc interval normal axis.  Open bradycardic, normal ECG.  Js Strauss MD   [BB]   1227 Radiology, bilateral lower lung zone infiltrates noted on plain film. [TC]   1335 CBC without evidence of leukocytosis.  H&H normal.  No evidence of thrombocytopenia.  Neutrophilia is present. [TC]   1335 Troponin normal. [TC]   1335 Grossly normal BNP. [TC]   1335 CMP without abnormalities.  Procalcitonin negative. [TC]   1336 Will be awaiting magnesium and repeat troponin. [TC]   1352 Magnesium normal [TC]   1544 Patient reports pain is improved since arrival.  Will reassess after aspirin and

## 2024-08-03 LAB
EKG ATRIAL RATE: 56 BPM
EKG DIAGNOSIS: NORMAL
EKG P AXIS: 11 DEGREES
EKG P-R INTERVAL: 164 MS
EKG Q-T INTERVAL: 474 MS
EKG QRS DURATION: 72 MS
EKG QTC CALCULATION (BAZETT): 457 MS
EKG R AXIS: 64 DEGREES
EKG T AXIS: 77 DEGREES
EKG VENTRICULAR RATE: 56 BPM

## 2024-08-03 PROCEDURE — 93010 ELECTROCARDIOGRAM REPORT: CPT | Performed by: INTERNAL MEDICINE

## (undated) DEVICE — BAND COMPR L24CM REG CLR PLAS HEMSTAT EXT HK AND LOOP RETEN

## (undated) DEVICE — GLIDESHEATH SLENDER STAINLESS STEEL KIT: Brand: GLIDESHEATH SLENDER

## (undated) DEVICE — CATHETER COR DIAG 4.0 5FR 110CM 2 SIDE H

## (undated) DEVICE — GUIDEWIRE 035IN 210CM PTFE COAT FIX COR J TIP 15MM FIRM BODY

## (undated) DEVICE — CATHETER GUID AD 6FR L100CM COR PERIPH GRN NYL PTFE XB L 3

## (undated) DEVICE — CATH BLLN ANGIO 2.75X15MM NC EUPHORIA RX

## (undated) DEVICE — CATHETER DIAG AD 5FR L110CM 145DEG COR GRY HYDRPHLC NYL

## (undated) DEVICE — HI-TORQUE PILOT 50 GUIDE WIRE .014 J TIP 3.0 CM X 190 CM: Brand: HI-TORQUE PILOT